# Patient Record
Sex: FEMALE | Race: WHITE | NOT HISPANIC OR LATINO | Employment: FULL TIME | URBAN - NONMETROPOLITAN AREA
[De-identification: names, ages, dates, MRNs, and addresses within clinical notes are randomized per-mention and may not be internally consistent; named-entity substitution may affect disease eponyms.]

---

## 2017-05-18 RX ORDER — FLUCONAZOLE 150 MG/1
150 TABLET ORAL ONCE
Qty: 2 TABLET | Refills: 0 | Status: SHIPPED | OUTPATIENT
Start: 2017-05-18 | End: 2017-05-18

## 2017-10-04 ENCOUNTER — TELEPHONE (OUTPATIENT)
Dept: OBSTETRICS AND GYNECOLOGY | Facility: CLINIC | Age: 47
End: 2017-10-04

## 2017-10-04 DIAGNOSIS — Z01.419 WELL WOMAN EXAM WITH ROUTINE GYNECOLOGICAL EXAM: Primary | ICD-10-CM

## 2017-10-04 NOTE — TELEPHONE ENCOUNTER
Pt has yearly appt on 10/12/17 but not until 2p. Wants yearly labs and knows she needs to be fasting. Wants to have labs ordered so she can come in the morning and then return for appt at 2.

## 2017-10-12 ENCOUNTER — OFFICE VISIT (OUTPATIENT)
Dept: OBSTETRICS AND GYNECOLOGY | Facility: CLINIC | Age: 47
End: 2017-10-12

## 2017-10-12 VITALS
SYSTOLIC BLOOD PRESSURE: 118 MMHG | DIASTOLIC BLOOD PRESSURE: 66 MMHG | HEIGHT: 66 IN | BODY MASS INDEX: 30.05 KG/M2 | WEIGHT: 187 LBS

## 2017-10-12 DIAGNOSIS — M25.50 ARTHRALGIA, UNSPECIFIED JOINT: ICD-10-CM

## 2017-10-12 DIAGNOSIS — N95.1 MENOPAUSAL SYMPTOMS: ICD-10-CM

## 2017-10-12 DIAGNOSIS — T75.3XXA SEA SICKNESS, INITIAL ENCOUNTER: ICD-10-CM

## 2017-10-12 DIAGNOSIS — E03.9 ACQUIRED HYPOTHYROIDISM: ICD-10-CM

## 2017-10-12 DIAGNOSIS — Z01.419 WELL WOMAN EXAM WITH ROUTINE GYNECOLOGICAL EXAM: Primary | ICD-10-CM

## 2017-10-12 DIAGNOSIS — F32.9 REACTIVE DEPRESSION: ICD-10-CM

## 2017-10-12 PROCEDURE — G0123 SCREEN CERV/VAG THIN LAYER: HCPCS | Performed by: NURSE PRACTITIONER

## 2017-10-12 PROCEDURE — 99213 OFFICE O/P EST LOW 20 MIN: CPT | Performed by: NURSE PRACTITIONER

## 2017-10-12 PROCEDURE — 99396 PREV VISIT EST AGE 40-64: CPT | Performed by: NURSE PRACTITIONER

## 2017-10-12 RX ORDER — SCOLOPAMINE TRANSDERMAL SYSTEM 1 MG/1
1 PATCH, EXTENDED RELEASE TRANSDERMAL
Qty: 4 PATCH | Refills: 1 | Status: SHIPPED | OUTPATIENT
Start: 2017-10-12 | End: 2018-10-18

## 2017-10-12 RX ORDER — BUPROPION HYDROCHLORIDE 150 MG/1
150 TABLET ORAL DAILY
Qty: 30 TABLET | Refills: 12 | Status: SHIPPED | OUTPATIENT
Start: 2017-10-12 | End: 2018-10-18 | Stop reason: SDUPTHER

## 2017-10-12 NOTE — PROGRESS NOTES
"Subjective   Ines Rowland is a 46 y.o. female     HPI Comments: Here for yearly check up. Has just seen endocrinology at [facility] for follow up of Thyroid Cancer.    Gynecologic Exam   The patient's pertinent negatives include no pelvic pain, vaginal bleeding or vaginal discharge. The patient is experiencing no pain. Pertinent negatives include no abdominal pain, anorexia, back pain, chills, constipation, diarrhea, discolored urine, dysuria, fever, flank pain, frequency, headaches, hematuria, joint pain, joint swelling, nausea, painful intercourse, rash, sore throat, urgency or vomiting. She is sexually active. She uses hysterectomy for contraception.             /66  Ht 66\" (167.6 cm)  Wt 187 lb (84.8 kg)  LMP 10/12/2005 (Approximate)  BMI 30.18 kg/m2      Outpatient Encounter Prescriptions as of 10/12/2017   Medication Sig Dispense Refill   • buPROPion XL (WELLBUTRIN XL) 150 MG 24 hr tablet Take 1 tablet by mouth Daily. 30 tablet 12   • calcitriol (ROCALTROL) 0.25 MCG capsule Take 0.25 mcg by mouth 2 (Two) Times a Day.     • calcium carbonate (OS-TIMOTEO) 600 MG tablet Take 600 mg by mouth 2 (Two) Times a Day.     • Hydrocod Polst-CPM Polst ER (TUSSIONEX PENNKINETIC ER) 10-8 MG/5ML ER suspension Take 5 mL by mouth Every 12 (Twelve) Hours As Needed for cough. 1 bottle 0   • levothyroxine (SYNTHROID, LEVOTHROID) 150 MCG tablet Take 150 mcg by mouth Daily.     • Scopolamine (TRANSDERM-SCOP, 1.5 MG,) 1.5 MG/3DAYS patch Place 1 patch on the skin Every 72 (Seventy-Two) Hours. 4 patch 1   • [DISCONTINUED] vilazodone (VIIBRYD) 20 MG tablet tablet Take 20 mg by mouth Daily.       No facility-administered encounter medications on file as of 10/12/2017.            Surgical History  Past Surgical History:   Procedure Laterality Date   • HYSTERECTOMY      without BSO   • KNEE ARTHROPLASTY     • TOTAL THYROIDECTOMY     • WISDOM TOOTH EXTRACTION           Family History  Family History   Problem Relation Age of Onset   • " Prostate cancer Father 72   • Arthritis Mother    • Arthritis Brother    • Colon cancer Paternal Uncle 50   • Thyroid cancer Other    • Breast cancer Neg Hx    • Ovarian cancer Neg Hx    • Uterine cancer Neg Hx    • Melanoma Neg Hx              The following portions of the patient's history were reviewed and updated as appropriate: allergies, current medications, past family history, past medical history, past social history, past surgical history and problem list.    Review of Systems   Constitutional: Negative for activity change, appetite change, chills, diaphoresis, fatigue, fever and unexpected weight change.   HENT: Negative for congestion, ear discharge, ear pain, facial swelling, hearing loss, mouth sores, nosebleeds, postnasal drip, rhinorrhea, sinus pressure, sneezing, sore throat, tinnitus, trouble swallowing and voice change.    Eyes: Negative for photophobia, pain, discharge, redness, itching and visual disturbance.   Respiratory: Negative for apnea, cough, choking, chest tightness and shortness of breath.    Cardiovascular: Negative for chest pain, palpitations and leg swelling.   Gastrointestinal: Negative for abdominal distention, abdominal pain, anal bleeding, anorexia, blood in stool, constipation, diarrhea, nausea, rectal pain and vomiting.   Endocrine: Negative for cold intolerance and heat intolerance.   Genitourinary: Negative for decreased urine volume, difficulty urinating, dyspareunia, dysuria, flank pain, frequency, genital sores, hematuria, menstrual problem, pelvic pain, urgency, vaginal bleeding, vaginal discharge and vaginal pain.   Musculoskeletal: Negative for arthralgias, back pain, joint pain, joint swelling and myalgias.   Skin: Negative for color change and rash.   Allergic/Immunologic: Negative for environmental allergies.   Neurological: Negative for dizziness, syncope, weakness, numbness and headaches.   Hematological: Negative for adenopathy.   Psychiatric/Behavioral:  Negative for agitation, confusion and sleep disturbance. The patient is not nervous/anxious.              Objective   Physical Exam   Constitutional: She is oriented to person, place, and time. She appears well-developed and well-nourished.   HENT:   Head: Normocephalic and atraumatic.   Right Ear: External ear normal.   Left Ear: External ear normal.   Eyes: Conjunctivae and EOM are normal. Right eye exhibits no discharge. Left eye exhibits no discharge. No scleral icterus.   Neck: Normal range of motion. Neck supple. Carotid bruit is not present. No thyromegaly present.   Cardiovascular: Regular rhythm and normal heart sounds.    No murmur heard.  Pulmonary/Chest: Effort normal and breath sounds normal. No respiratory distress. Right breast exhibits no inverted nipple, no mass, no nipple discharge, no skin change and no tenderness. Left breast exhibits no inverted nipple, no mass, no nipple discharge, no skin change and no tenderness. Breasts are symmetrical. There is no breast swelling.   Abdominal: Soft. Bowel sounds are normal. She exhibits no distension and no mass. There is no tenderness. There is no guarding. No hernia. Hernia confirmed negative in the right inguinal area and confirmed negative in the left inguinal area.   Genitourinary: Vagina normal. Rectal exam shows no mass. No breast tenderness, discharge or bleeding. There is no rash, tenderness, lesion or injury on the right labia. There is no rash, tenderness, lesion or injury on the left labia. Right adnexum displays no mass, no tenderness and no fullness. Left adnexum displays no mass, no tenderness and no fullness. No erythema or bleeding in the vagina. No signs of injury around the vagina. No vaginal discharge found.   Genitourinary Comments: Cervix, Uterus and Adnexa are surgically absent.  Urethra and urethral meatus normal  Bladder, normal no prolapse  Perineum and anus examined and without lesions   Musculoskeletal: Normal range of motion.  She exhibits no edema or tenderness.   Lymphadenopathy:        Head (right side): No submental, no submandibular, no tonsillar, no preauricular, no posterior auricular and no occipital adenopathy present.        Head (left side): No submental, no submandibular, no tonsillar, no preauricular, no posterior auricular and no occipital adenopathy present.     She has no cervical adenopathy.        Right cervical: No superficial cervical, no deep cervical and no posterior cervical adenopathy present.       Left cervical: No superficial cervical, no deep cervical and no posterior cervical adenopathy present.     She has no axillary adenopathy.        Right: No inguinal adenopathy present.        Left: No inguinal adenopathy present.   Neurological: She is alert and oriented to person, place, and time. She exhibits normal muscle tone. Coordination normal.   Skin: Skin is warm and dry. No bruising and no rash noted. No erythema.   Psychiatric: She has a normal mood and affect. Her behavior is normal. Judgment and thought content normal.   Nursing note and vitals reviewed.        Assessment/Plan   Ines was seen today for gynecologic exam.    Diagnoses and all orders for this visit:    Well woman exam with routine gynecological exam  Normal GYN exam. Will have lab work today. Encouraged SBE. Discussed weight management and importance of maintaining a healthy weight. Discussed Vitamin D intake and the importance of adequate vitamin D. Mammogram will be scheduled at [facility].  -     Liquid-based Pap Smear, Screening - ThinPrep Vial, Cervix  -     Mammo Screening Digital Tomosynthesis Bilateral With CAD; Future    Menopausal symptoms  Comments:  Having menopausal symptoms and wants to have lab work done today.  Orders:  -     Cortisol  -     DHEA-Sulfate  -     Estradiol  -     Follicle Stimulating Hormone  -     Luteinizing Hormone  -     Progesterone  -     Testosterone    Arthralgia, unspecified joint  Comments:  Pt. is having  joint pain. Wants to have an arthritis panel.  Orders:  -     Rheumatoid Arthritis Expanded Panel  -     C-reactive Protein  -     Sedimentation Rate    Acquired hypothyroidism  Comments:  Had thyroid cancer 2 years ago. Sees an endocrinologist at [facility] and wants labs done today.  Orders:  -     Iodine, Serum or Plasma  -     T3, Reverse  -     Thyroid Antibodies    Reactive depression  Comments:  Pt. having some depression and would like to try Wellbutrin.  Orders:  -     buPROPion XL (WELLBUTRIN XL) 150 MG 24 hr tablet; Take 1 tablet by mouth Daily.    Sea sickness, initial encounter  Comments:  Pt. is going on a cruise and requests Transderm scop.  Orders:  -     Scopolamine (TRANSDERM-SCOP, 1.5 MG,) 1.5 MG/3DAYS patch; Place 1 patch on the skin Every 72 (Seventy-Two) Hours.

## 2017-10-13 LAB
25(OH)D3+25(OH)D2 SERPL-MCNC: 28.4 NG/ML (ref 30–100)
ALBUMIN SERPL-MCNC: 4.2 G/DL (ref 3.5–5)
ALBUMIN/GLOB SERPL: 1.2 G/DL (ref 1.1–2.5)
ALP SERPL-CCNC: 103 U/L (ref 24–120)
ALT SERPL-CCNC: 40 U/L (ref 0–54)
APPEARANCE UR: CLEAR
AST SERPL-CCNC: 28 U/L (ref 7–45)
BACTERIA #/AREA URNS HPF: NORMAL /HPF
BASOPHILS # BLD AUTO: 0.02 10*3/MM3 (ref 0–0.2)
BASOPHILS NFR BLD AUTO: 0.2 % (ref 0–2)
BILIRUB SERPL-MCNC: 0.3 MG/DL (ref 0.1–1)
BILIRUB UR QL STRIP: NEGATIVE
BUN SERPL-MCNC: 20 MG/DL (ref 5–21)
BUN/CREAT SERPL: 23.8 (ref 7–25)
CALCIUM SERPL-MCNC: 9.1 MG/DL (ref 8.4–10.4)
CHLORIDE SERPL-SCNC: 105 MMOL/L (ref 98–110)
CHOLEST SERPL-MCNC: 190 MG/DL (ref 130–200)
CO2 SERPL-SCNC: 28 MMOL/L (ref 24–31)
COLOR UR: YELLOW
CREAT SERPL-MCNC: 0.84 MG/DL (ref 0.5–1.4)
EOSINOPHIL # BLD AUTO: 0.12 10*3/MM3 (ref 0–0.7)
EOSINOPHIL NFR BLD AUTO: 1.4 % (ref 0–4)
EPI CELLS #/AREA URNS HPF: NORMAL /HPF
ERYTHROCYTE [DISTWIDTH] IN BLOOD BY AUTOMATED COUNT: 13.4 % (ref 12–15)
GLOBULIN SER CALC-MCNC: 3.5 GM/DL
GLUCOSE SERPL-MCNC: 80 MG/DL (ref 70–100)
GLUCOSE UR QL: NEGATIVE
HBA1C MFR BLD: 5.1 %
HCT VFR BLD AUTO: 40.9 % (ref 37–47)
HDLC SERPL-MCNC: 69 MG/DL
HGB BLD-MCNC: 13.5 G/DL (ref 12–16)
HGB UR QL STRIP: NEGATIVE
IMM GRANULOCYTES # BLD: 0.02 10*3/MM3 (ref 0–0.03)
IMM GRANULOCYTES NFR BLD: 0.2 % (ref 0–5)
KETONES UR QL STRIP: NEGATIVE
LDLC SERPL CALC-MCNC: 107 MG/DL (ref 0–99)
LDLC/HDLC SERPL: 1.55 {RATIO}
LEUKOCYTE ESTERASE UR QL STRIP: NEGATIVE
LYMPHOCYTES # BLD AUTO: 1.82 10*3/MM3 (ref 0.72–4.86)
LYMPHOCYTES NFR BLD AUTO: 21.7 % (ref 15–45)
MCH RBC QN AUTO: 30.2 PG (ref 28–32)
MCHC RBC AUTO-ENTMCNC: 33 G/DL (ref 33–36)
MCV RBC AUTO: 91.5 FL (ref 82–98)
MICRO URNS: NORMAL
MICRO URNS: NORMAL
MONOCYTES # BLD AUTO: 0.95 10*3/MM3 (ref 0.19–1.3)
MONOCYTES NFR BLD AUTO: 11.3 % (ref 4–12)
MUCOUS THREADS URNS QL MICRO: PRESENT /HPF
NEUTROPHILS # BLD AUTO: 5.47 10*3/MM3 (ref 1.87–8.4)
NEUTROPHILS NFR BLD AUTO: 65.2 % (ref 39–78)
NITRITE UR QL STRIP: NEGATIVE
PH UR STRIP: 7 [PH] (ref 5–7.5)
PLATELET # BLD AUTO: 363 10*3/MM3 (ref 130–400)
POTASSIUM SERPL-SCNC: 4.6 MMOL/L (ref 3.5–5.3)
PROT SERPL-MCNC: 7.7 G/DL (ref 6.3–8.7)
PROT UR QL STRIP: NEGATIVE
RBC # BLD AUTO: 4.47 10*6/MM3 (ref 4.2–5.4)
RBC #/AREA URNS HPF: NORMAL /HPF
SODIUM SERPL-SCNC: 144 MMOL/L (ref 135–145)
SP GR UR: 1.02 (ref 1–1.03)
T3RU NFR SERPL: 32 % (ref 24–39)
T4 FREE SERPL-MCNC: 1.99 NG/DL (ref 0.78–2.19)
TRIGL SERPL-MCNC: 69 MG/DL (ref 0–149)
TSH SERPL DL<=0.005 MIU/L-ACNC: 0.21 MIU/ML (ref 0.47–4.68)
URINALYSIS REFLEX: NORMAL
UROBILINOGEN UR STRIP-MCNC: 0.2 MG/DL (ref 0.2–1)
VLDLC SERPL CALC-MCNC: 13.8 MG/DL
WBC # BLD AUTO: 8.4 10*3/MM3 (ref 4.8–10.8)
WBC #/AREA URNS HPF: NORMAL /HPF

## 2017-10-16 DIAGNOSIS — M05.79 RHEUMATOID ARTHRITIS INVOLVING MULTIPLE SITES WITH POSITIVE RHEUMATOID FACTOR (HCC): Primary | ICD-10-CM

## 2017-10-16 LAB
CCP IGA+IGG SERPL IA-ACNC: 16 UNITS (ref 0–19)
CORTIS SERPL-MCNC: 5.4 UG/DL
CRP SERPL-MCNC: 9.4 MG/L (ref 0–4.9)
DHEA-S SERPL-MCNC: 68.8 UG/DL (ref 41.2–243.7)
ERYTHROCYTE [SEDIMENTATION RATE] IN BLOOD BY WESTERGREN METHOD: 51 MM/HR (ref 0–32)
ESTRADIOL SERPL-MCNC: 7.8 PG/ML
FSH SERPL-ACNC: 16.8 MIU/ML
IODINE SERPL-MCNC: 38.9 UG/L (ref 40–92)
LH SERPL-ACNC: 8.7 MIU/ML
PROGEST SERPL-MCNC: <0.1 NG/ML
RHEUMATOID FACT SERPL-ACNC: 32.7 IU/ML (ref 0–13.9)
T3REVERSE SERPL-MCNC: 21.2 NG/DL (ref 9.2–24.1)
TESTOST SERPL-MCNC: 6 NG/DL (ref 8–48)
THYROGLOB AB SERPL-ACNC: <1 IU/ML (ref 0–0.9)
THYROPEROXIDASE AB SERPL-ACNC: 12 IU/ML (ref 0–34)

## 2017-10-17 LAB
GEN CATEG CVX/VAG CYTO-IMP: NORMAL
LAB AP CASE REPORT: NORMAL
LAB AP GYN ADDITIONAL INFORMATION: NORMAL
Lab: NORMAL
PATH INTERP SPEC-IMP: NORMAL
STAT OF ADQ CVX/VAG CYTO-IMP: NORMAL

## 2017-10-19 ENCOUNTER — DOCUMENTATION (OUTPATIENT)
Dept: OBSTETRICS AND GYNECOLOGY | Facility: CLINIC | Age: 47
End: 2017-10-19

## 2017-11-03 ENCOUNTER — HOSPITAL ENCOUNTER (OUTPATIENT)
Dept: MAMMOGRAPHY | Facility: HOSPITAL | Age: 47
Discharge: HOME | End: 2017-11-03
Admitting: NURSE PRACTITIONER

## 2017-11-03 DIAGNOSIS — Z01.419 WELL WOMAN EXAM WITH ROUTINE GYNECOLOGICAL EXAM: ICD-10-CM

## 2017-11-03 DIAGNOSIS — R92.8 ABNORMAL MAMMOGRAM: Primary | ICD-10-CM

## 2017-11-03 PROCEDURE — 77063 BREAST TOMOSYNTHESIS BI: CPT

## 2017-11-03 PROCEDURE — G0202 SCR MAMMO BI INCL CAD: HCPCS

## 2017-11-03 NOTE — PROGRESS NOTES
Pt informed she needs to have mammogram et u/s of left breast. TRC states it does not look suspicious, but needs to be done. They will call pt with apt.

## 2017-11-07 ENCOUNTER — HOSPITAL ENCOUNTER (OUTPATIENT)
Dept: ULTRASOUND IMAGING | Facility: HOSPITAL | Age: 47
Discharge: HOME | End: 2017-11-07

## 2017-11-07 ENCOUNTER — HOSPITAL ENCOUNTER (OUTPATIENT)
Dept: MAMMOGRAPHY | Facility: HOSPITAL | Age: 47
Discharge: HOME | End: 2017-11-07
Admitting: NURSE PRACTITIONER

## 2017-11-07 DIAGNOSIS — R92.8 ABNORMAL MAMMOGRAM: ICD-10-CM

## 2017-11-07 PROCEDURE — 76642 ULTRASOUND BREAST LIMITED: CPT

## 2017-11-07 PROCEDURE — G0206 DX MAMMO INCL CAD UNI: HCPCS

## 2017-11-07 PROCEDURE — G0279 TOMOSYNTHESIS, MAMMO: HCPCS

## 2018-04-09 ENCOUNTER — TRANSCRIBE ORDERS (OUTPATIENT)
Dept: ADMINISTRATIVE | Facility: HOSPITAL | Age: 48
End: 2018-04-09

## 2018-04-09 DIAGNOSIS — R92.8 ABNORMAL MAMMOGRAM: Primary | ICD-10-CM

## 2018-04-12 ENCOUNTER — HOSPITAL ENCOUNTER (OUTPATIENT)
Dept: ULTRASOUND IMAGING | Facility: HOSPITAL | Age: 48
Discharge: HOME | End: 2018-04-12
Attending: OBSTETRICS & GYNECOLOGY

## 2018-04-12 DIAGNOSIS — R92.8 ABNORMAL MAMMOGRAM: ICD-10-CM

## 2018-04-12 PROCEDURE — 76642 ULTRASOUND BREAST LIMITED: CPT

## 2018-04-13 ENCOUNTER — TELEPHONE (OUTPATIENT)
Dept: OBSTETRICS AND GYNECOLOGY | Facility: CLINIC | Age: 48
End: 2018-04-13

## 2018-04-13 NOTE — TELEPHONE ENCOUNTER
----- Message from ORACIO Abernathy sent at 4/12/2018  9:20 PM CDT -----  US final assessment indicates benign simple cyst, left breast.   Pt may resume annual screening mammograms in 6 months.     Please continue annual physical exams.

## 2018-10-18 ENCOUNTER — OFFICE VISIT (OUTPATIENT)
Dept: OBSTETRICS AND GYNECOLOGY | Facility: CLINIC | Age: 48
End: 2018-10-18

## 2018-10-18 VITALS
BODY MASS INDEX: 29.41 KG/M2 | HEIGHT: 66 IN | SYSTOLIC BLOOD PRESSURE: 116 MMHG | WEIGHT: 183 LBS | DIASTOLIC BLOOD PRESSURE: 64 MMHG

## 2018-10-18 DIAGNOSIS — Z01.419 WELL WOMAN EXAM WITH ROUTINE GYNECOLOGICAL EXAM: Primary | ICD-10-CM

## 2018-10-18 DIAGNOSIS — Z80.0 FAMILY HISTORY OF COLON CANCER: ICD-10-CM

## 2018-10-18 DIAGNOSIS — R53.83 FATIGUE, UNSPECIFIED TYPE: ICD-10-CM

## 2018-10-18 DIAGNOSIS — F32.9 REACTIVE DEPRESSION: ICD-10-CM

## 2018-10-18 DIAGNOSIS — C73 THYROID CANCER (HCC): ICD-10-CM

## 2018-10-18 PROCEDURE — 99213 OFFICE O/P EST LOW 20 MIN: CPT | Performed by: NURSE PRACTITIONER

## 2018-10-18 PROCEDURE — 99396 PREV VISIT EST AGE 40-64: CPT | Performed by: NURSE PRACTITIONER

## 2018-10-18 RX ORDER — BUPROPION HYDROCHLORIDE 150 MG/1
150 TABLET ORAL DAILY
Qty: 30 TABLET | Refills: 12 | Status: SHIPPED | OUTPATIENT
Start: 2018-10-18 | End: 2019-10-25 | Stop reason: SDUPTHER

## 2018-10-18 NOTE — PROGRESS NOTES
"Subjective   Ines Rowland is a 47 y.o. female     Here for yearly check up. Has just seen endocrinology at [facility] for follow up of Thyroid Cancer.      Gynecologic Exam   The patient's pertinent negatives include no pelvic pain, vaginal bleeding or vaginal discharge. The patient is experiencing no pain. Pertinent negatives include no abdominal pain, anorexia, back pain, chills, constipation, diarrhea, discolored urine, dysuria, fever, flank pain, frequency, headaches, hematuria, joint pain, joint swelling, nausea, painful intercourse, rash, sore throat, urgency or vomiting. She is sexually active. She uses hysterectomy for contraception.             /64   Ht 167.6 cm (66\")   Wt 83 kg (183 lb)   LMP 10/12/2005 (Approximate) Comment: Pelvic pain  BMI 29.54 kg/m²       Outpatient Encounter Prescriptions as of 10/18/2018   Medication Sig Dispense Refill   • buPROPion XL (WELLBUTRIN XL) 150 MG 24 hr tablet Take 1 tablet by mouth Daily. 30 tablet 12   • calcitriol (ROCALTROL) 0.25 MCG capsule Take 0.25 mcg by mouth 2 (Two) Times a Day.     • calcium carbonate (OS-TIMOTEO) 600 MG tablet Take 600 mg by mouth 2 (Two) Times a Day.     • levothyroxine (SYNTHROID, LEVOTHROID) 150 MCG tablet Take 150 mcg by mouth Daily.     • [DISCONTINUED] Hydrocod Polst-CPM Polst ER (TUSSIONEX PENNKINETIC ER) 10-8 MG/5ML ER suspension Take 5 mL by mouth Every 12 (Twelve) Hours As Needed for cough. 1 bottle 0   • [DISCONTINUED] Scopolamine (TRANSDERM-SCOP, 1.5 MG,) 1.5 MG/3DAYS patch Place 1 patch on the skin Every 72 (Seventy-Two) Hours. 4 patch 1     No facility-administered encounter medications on file as of 10/18/2018.            Surgical History  Past Surgical History:   Procedure Laterality Date   • HYSTERECTOMY      without BSO   • KNEE ARTHROPLASTY     • TOTAL THYROIDECTOMY     • WISDOM TOOTH EXTRACTION           Family History  Family History   Problem Relation Age of Onset   • Prostate cancer Father 72   • Arthritis Mother  "   • Arthritis Brother    • Colon cancer Paternal Uncle 50   • Thyroid cancer Other    • Breast cancer Neg Hx    • Ovarian cancer Neg Hx    • Uterine cancer Neg Hx    • Melanoma Neg Hx              The following portions of the patient's history were reviewed and updated as appropriate: allergies, current medications, past family history, past medical history, past social history, past surgical history and problem list.    Review of Systems   Constitutional: Negative for activity change, appetite change, chills, diaphoresis, fatigue, fever and unexpected weight change.   HENT: Negative for congestion, dental problem, drooling, ear discharge, ear pain, facial swelling, hearing loss, mouth sores, nosebleeds, postnasal drip, rhinorrhea, sinus pain, sinus pressure, sneezing, sore throat, tinnitus, trouble swallowing and voice change.    Eyes: Negative for photophobia, pain, discharge, redness, itching and visual disturbance.   Respiratory: Negative for apnea, cough, choking, chest tightness, shortness of breath, wheezing and stridor.    Cardiovascular: Negative for chest pain, palpitations and leg swelling.   Gastrointestinal: Negative for abdominal distention, abdominal pain, anal bleeding, anorexia, blood in stool, constipation, diarrhea, nausea, rectal pain and vomiting.   Endocrine: Negative for cold intolerance, heat intolerance, polydipsia, polyphagia and polyuria.   Genitourinary: Negative for decreased urine volume, difficulty urinating, dyspareunia, dysuria, enuresis, flank pain, frequency, genital sores, hematuria, menstrual problem, pelvic pain, urgency, vaginal bleeding, vaginal discharge and vaginal pain.   Musculoskeletal: Negative for arthralgias, back pain, gait problem, joint pain, joint swelling, myalgias, neck pain and neck stiffness.   Skin: Negative for color change, pallor, rash and wound.   Allergic/Immunologic: Negative for environmental allergies, food allergies and immunocompromised state.    Neurological: Negative for dizziness, tremors, seizures, syncope, facial asymmetry, speech difficulty, weakness, light-headedness, numbness and headaches.   Hematological: Negative for adenopathy. Does not bruise/bleed easily.   Psychiatric/Behavioral: Negative for agitation, behavioral problems, confusion, decreased concentration, dysphoric mood, hallucinations, self-injury, sleep disturbance and suicidal ideas. The patient is not nervous/anxious and is not hyperactive.              Objective   Physical Exam   Constitutional: She is oriented to person, place, and time. She appears well-developed and well-nourished.   HENT:   Head: Normocephalic and atraumatic.   Right Ear: External ear normal.   Left Ear: External ear normal.   Eyes: Conjunctivae and EOM are normal. Right eye exhibits no discharge. Left eye exhibits no discharge. No scleral icterus.   Neck: Normal range of motion. Neck supple. Carotid bruit is not present. No thyromegaly present.   Cardiovascular: Regular rhythm and normal heart sounds.    No murmur heard.  Pulmonary/Chest: Effort normal and breath sounds normal. No respiratory distress. Right breast exhibits no inverted nipple, no mass, no nipple discharge, no skin change and no tenderness. Left breast exhibits no inverted nipple, no mass, no nipple discharge, no skin change and no tenderness. Breasts are symmetrical. There is no breast swelling.   Abdominal: Soft. Bowel sounds are normal. She exhibits no distension and no mass. There is no tenderness. There is no guarding. No hernia. Hernia confirmed negative in the right inguinal area and confirmed negative in the left inguinal area.   Genitourinary: Vagina normal. Rectal exam shows no mass. No breast tenderness, discharge or bleeding. There is no rash, tenderness, lesion or injury on the right labia. There is no rash, tenderness, lesion or injury on the left labia. Right adnexum displays no mass, no tenderness and no fullness. Left adnexum  displays no mass, no tenderness and no fullness. No erythema or bleeding in the vagina. No signs of injury around the vagina. No vaginal discharge found.   Genitourinary Comments: Cervix, Uterus and Adnexa are surgically absent.  Urethra and urethral meatus normal  Bladder, normal no prolapse  Perineum and anus examined and without lesions   Musculoskeletal: Normal range of motion. She exhibits no edema or tenderness.   Lymphadenopathy:        Head (right side): No submental, no submandibular, no tonsillar, no preauricular, no posterior auricular and no occipital adenopathy present.        Head (left side): No submental, no submandibular, no tonsillar, no preauricular, no posterior auricular and no occipital adenopathy present.     She has no cervical adenopathy.        Right cervical: No superficial cervical, no deep cervical and no posterior cervical adenopathy present.       Left cervical: No superficial cervical, no deep cervical and no posterior cervical adenopathy present.     She has no axillary adenopathy.        Right: No inguinal adenopathy present.        Left: No inguinal adenopathy present.   Neurological: She is alert and oriented to person, place, and time. She exhibits normal muscle tone. Coordination normal.   Skin: Skin is warm and dry. No bruising and no rash noted. No erythema.   Psychiatric: She has a normal mood and affect. Her behavior is normal. Judgment and thought content normal.   Nursing note and vitals reviewed.        Assessment/Plan   Ines was seen today for gynecologic exam.    Diagnoses and all orders for this visit:    Well woman exam with routine gynecological exam  Normal GYN exam. Will have lab work today. Encouraged SBE. Discussed weight management and importance of maintaining a healthy weight. Discussed Vitamin D intake and the importance of adequate vitamin D. Mammogram will be scheduled at [facility].  -     Liquid-based Pap Smear, Screening - ThinPrep Vial, Cervix  -     Mammo  Screening Digital Tomosynthesis Bilateral With CAD; Future    Menopausal symptoms  Comments:  Having menopausal symptoms and wants to have lab work done today.  Orders:  -     Cortisol  -     DHEA-Sulfate  -     Estradiol  -     Follicle Stimulating Hormone  -     Luteinizing Hormone  -     Progesterone  -     Testosterone    Arthralgia, unspecified joint  Comments:  Pt. is having joint pain. Wants to have an arthritis panel.  Orders:  -     Rheumatoid Arthritis Expanded Panel  -     C-reactive Protein  -     Sedimentation Rate    Acquired hypothyroidism  Comments:  Had thyroid cancer 2 years ago. Sees an endocrinologist at [facility] and wants labs done today.  Orders:  -     Iodine, Serum or Plasma  -     T3, Reverse  -     Thyroid Antibodies    Reactive depression  Comments:  Pt. having some depression and would like to try Wellbutrin.  Orders:  -     buPROPion XL (WELLBUTRIN XL) 150 MG 24 hr tablet; Take 1 tablet by mouth Daily.    Sea sickness, initial encounter  Comments:  Pt. is going on a cruise and requests Transderm scop.  Orders:  -     Scopolamine (TRANSDERM-SCOP, 1.5 MG,) 1.5 MG/3DAYS patch; Place 1 patch on the skin Every 72 (Seventy-Two) Hours.

## 2018-10-18 NOTE — PATIENT INSTRUCTIONS

## 2018-10-18 NOTE — PROGRESS NOTES
Attempted to obtain health maintenance information, patient unable to provide answers for the following items Tdap, Colonoscopy, Pneumococcal Vaccine, Medicare Annual Wellness Exam, Lipid Panel, Diabetic Eye Exam, Diabetic Foot Exam, HPV Vaccine, Chlamydia Screening , HgbA1c and Zoster Vaccine.

## 2018-10-18 NOTE — PROGRESS NOTES
"Subjective     Ines Rowland is a 47 y.o. female    Here for yearly check up. Has C/O fatigue. Has just had her thyroid checked with Dr. Panda and it was where he wants her levels to be. She has a HX of thyroid cancer.        Gynecologic Exam   The patient's pertinent negatives include no pelvic pain, vaginal bleeding or vaginal discharge. The patient is experiencing no pain. Associated symptoms include constipation. Pertinent negatives include no abdominal pain, anorexia, back pain, chills, diarrhea, discolored urine, dysuria, fever, flank pain, frequency, headaches, hematuria, joint pain, joint swelling, nausea, painful intercourse, rash, sore throat, urgency or vomiting. The symptoms are aggravated by bowel movements. Treatments tried: miralax. She is sexually active. She uses hysterectomy for contraception. She is postmenopausal.   Fatigue   This is a recurrent problem. The current episode started more than 1 month ago. The problem occurs intermittently. The problem has been waxing and waning. Associated symptoms include fatigue. Pertinent negatives include no abdominal pain, anorexia, arthralgias, change in bowel habit, chest pain, chills, congestion, coughing, diaphoresis, fever, headaches, joint swelling, myalgias, nausea, neck pain, numbness, rash, sore throat, swollen glands, urinary symptoms, vertigo, visual change, vomiting or weakness. Nothing aggravates the symptoms. She has tried nothing for the symptoms.         /64   Ht 167.6 cm (66\")   Wt 83 kg (183 lb)   LMP 10/12/2005 (Approximate) Comment: Pelvic pain  BMI 29.54 kg/m²     Outpatient Encounter Prescriptions as of 10/18/2018   Medication Sig Dispense Refill   • buPROPion XL (WELLBUTRIN XL) 150 MG 24 hr tablet Take 1 tablet by mouth Daily. 30 tablet 12   • calcitriol (ROCALTROL) 0.25 MCG capsule Take 0.25 mcg by mouth 2 (Two) Times a Day.     • calcium carbonate (OS-TIMOTEO) 600 MG tablet Take 600 mg by mouth 2 (Two) Times a Day.     • " levothyroxine (SYNTHROID, LEVOTHROID) 150 MCG tablet Take 150 mcg by mouth Daily.     • [DISCONTINUED] buPROPion XL (WELLBUTRIN XL) 150 MG 24 hr tablet Take 1 tablet by mouth Daily. 30 tablet 12   • [DISCONTINUED] Hydrocod Polst-CPM Polst ER (TUSSIONEX PENNKINETIC ER) 10-8 MG/5ML ER suspension Take 5 mL by mouth Every 12 (Twelve) Hours As Needed for cough. 1 bottle 0   • [DISCONTINUED] Scopolamine (TRANSDERM-SCOP, 1.5 MG,) 1.5 MG/3DAYS patch Place 1 patch on the skin Every 72 (Seventy-Two) Hours. 4 patch 1     No facility-administered encounter medications on file as of 10/18/2018.        Surgical History  Past Surgical History:   Procedure Laterality Date   • HYSTERECTOMY      without BSO   • KNEE ARTHROPLASTY     • TOTAL THYROIDECTOMY     • WISDOM TOOTH EXTRACTION         Family History  Family History   Problem Relation Age of Onset   • Prostate cancer Father 72   • Arthritis Mother    • Arthritis Brother    • Colon cancer Paternal Uncle 50   • Thyroid cancer Other    • Breast cancer Neg Hx    • Ovarian cancer Neg Hx    • Uterine cancer Neg Hx    • Melanoma Neg Hx        The following portions of the patient's history were reviewed and updated as appropriate: allergies, current medications, past family history, past medical history, past social history, past surgical history and problem list.    Review of Systems   Constitutional: Positive for fatigue. Negative for activity change, appetite change, chills, diaphoresis, fever, unexpected weight gain and unexpected weight loss.   HENT: Negative for congestion, dental problem, drooling, ear discharge, ear pain, facial swelling, hearing loss, mouth sores, nosebleeds, postnasal drip, rhinorrhea, sinus pressure, sneezing, sore throat, tinnitus, trouble swallowing and voice change.    Eyes: Negative for blurred vision, double vision, photophobia, pain, discharge, redness, itching and visual disturbance.   Respiratory: Negative for apnea, cough, choking, chest tightness,  shortness of breath, wheezing and stridor.    Cardiovascular: Negative for chest pain, palpitations and leg swelling.   Gastrointestinal: Positive for constipation. Negative for abdominal distention, abdominal pain, anal bleeding, anorexia, blood in stool, change in bowel habit, diarrhea, nausea, rectal pain, vomiting, GERD and indigestion.   Endocrine: Negative for cold intolerance, heat intolerance, polydipsia, polyphagia and polyuria.   Genitourinary: Negative for breast discharge, urinary incontinence, decreased libido, decreased urine volume, difficulty urinating, dyspareunia, dysuria, flank pain, frequency, genital sores, hematuria, menstrual problem, pelvic pain, urgency, vaginal bleeding, vaginal discharge, vaginal pain, breast lump and breast pain.   Musculoskeletal: Negative for arthralgias, back pain, gait problem, joint pain, joint swelling, myalgias, neck pain, neck stiffness and bursitis.   Skin: Negative for color change, dry skin and rash.   Allergic/Immunologic: Negative for environmental allergies, food allergies and immunocompromised state.   Neurological: Negative for dizziness, vertigo, tremors, seizures, syncope, facial asymmetry, speech difficulty, weakness, light-headedness, numbness, headache, memory problem and confusion.   Hematological: Negative for adenopathy. Does not bruise/bleed easily.   Psychiatric/Behavioral: Negative for agitation, behavioral problems, decreased concentration, dysphoric mood, hallucinations, self-injury, sleep disturbance, suicidal ideas, negative for hyperactivity, depressed mood and stress. The patient is not nervous/anxious.        Objective   Physical Exam   Constitutional: She is oriented to person, place, and time. She appears well-developed and well-nourished.   HENT:   Head: Normocephalic and atraumatic.   Right Ear: External ear normal.   Left Ear: External ear normal.   Eyes: Conjunctivae and EOM are normal. Right eye exhibits no discharge. Left eye  exhibits no discharge. No scleral icterus.   Neck: Normal range of motion. Neck supple. Carotid bruit is not present. No thyromegaly present.   Cardiovascular: Regular rhythm and normal heart sounds.    No murmur heard.  Pulmonary/Chest: Effort normal and breath sounds normal. No respiratory distress. Right breast exhibits no inverted nipple, no mass, no nipple discharge, no skin change and no tenderness. Left breast exhibits no inverted nipple, no mass, no nipple discharge, no skin change and no tenderness. Breasts are symmetrical. There is no breast swelling.   Abdominal: Soft. Bowel sounds are normal. She exhibits no distension and no mass. There is no tenderness. There is no guarding. No hernia. Hernia confirmed negative in the right inguinal area and confirmed negative in the left inguinal area.   Genitourinary: Vagina normal. Rectal exam shows no mass. No breast tenderness, discharge or bleeding. There is no rash, tenderness, lesion or injury on the right labia. There is no rash, tenderness, lesion or injury on the left labia. No erythema or bleeding in the vagina. No signs of injury around the vagina. No vaginal discharge found.   Genitourinary Comments: Cervix, Uterus and Adnexa are surgically absent.  Urethra and urethral meatus normal  Bladder, normal no prolapse  Perineum and anus examined and without lesions   Musculoskeletal: Normal range of motion. She exhibits no edema or tenderness.   Lymphadenopathy:        Head (right side): No submental, no submandibular, no tonsillar, no preauricular, no posterior auricular and no occipital adenopathy present.        Head (left side): No submental, no submandibular, no tonsillar, no preauricular, no posterior auricular and no occipital adenopathy present.     She has no cervical adenopathy.        Right cervical: No superficial cervical, no deep cervical and no posterior cervical adenopathy present.       Left cervical: No superficial cervical, no deep cervical  and no posterior cervical adenopathy present.     She has no axillary adenopathy.        Right: No inguinal adenopathy present.        Left: No inguinal adenopathy present.   Neurological: She is alert and oriented to person, place, and time. She exhibits normal muscle tone. Coordination normal.   Skin: Skin is warm and dry. No bruising and no rash noted. No erythema.   Psychiatric: She has a normal mood and affect. Her behavior is normal. Judgment and thought content normal.   Nursing note and vitals reviewed.      Assessment/Plan   Ines was seen today for gynecologic exam.    Diagnoses and all orders for this visit:    Well woman exam with routine gynecological exam  Normal GYN exam. Will have lab work at PCP. Encouraged SBE, pt is aware how to do self breast exam and the importance of same. Discussed weight management and importance of maintaining a healthy weight. Discussed Vitamin D intake and the importance of adequate vitamin D for both Bone Health and a healthy immune system.  Discussed Daily exercise and the importance of same, in regards to a healthy heart as well as helping to maintain her weight and improving her mental health.  BMI  29.6. Mammogram and Bone density [facility] at [facility].  Pap smear is not done per ASCCP guidelines.  -     UA / M With / Rflx Culture([facility] ONLY) - Urine, Clean Catch  -     Mammo Screening Digital Tomosynthesis Bilateral With CAD; Future  -     DEXA Bone Density Axial; Future  -     Occult Blood, Fecal By Immunoassay - Stool, Per Rectum    Thyroid cancer (CMS/HCC)  Comments:  Had surgery in 2016. Was followed by Endocrinology at [facility], but now is followed by Dr. Adamson. No evidence of mass today.     Reactive depression  Comments:  Pt. is doing well on Wellbutrin. Rf given.  Orders:  -     buPROPion XL (WELLBUTRIN XL) 150 MG 24 hr tablet; Take 1 tablet by mouth Daily.    Fatigue, unspecified type  Comments:  Pt is having a lot of fatigue. Will have lab work  done today.  Orders:  -     Hemoglobin A1c  -     Vitamin D 25 Hydroxy  -     Vitamin B12    Family history of colon cancer  Pt has a family history of Colon  Cancer. We discussed [employer] Genetic screening that can be done to see if she carries the Gene for Breast, Ovarian, Colon, Melanoma, Uterine and Pancreatic Cancers. We discussed if positive she will have free Genetic counseling through [facility]. We also discussed if she does have the positive gene she can have more testing yearly and even surgery if desired.         Patient's Body mass index is 29.54 kg/m². BMI is above normal parameters. Recommendations include: educational material, exercise counseling and nutrition counseling.      Lynette Gallegos, APRN  10/18/2018

## 2018-10-19 LAB
25(OH)D3+25(OH)D2 SERPL-MCNC: 37.8 NG/ML (ref 30–100)
APPEARANCE UR: ABNORMAL
BACTERIA #/AREA URNS HPF: ABNORMAL /HPF
BILIRUB UR QL STRIP: NEGATIVE
COLOR UR: YELLOW
CRYSTALS URNS MICRO: ABNORMAL
EPI CELLS #/AREA URNS HPF: ABNORMAL /HPF
GLUCOSE UR QL: NEGATIVE
HBA1C MFR BLD: 5.2 %
HEMOCCULT STL QL IA: NEGATIVE
HGB UR QL STRIP: NEGATIVE
KETONES UR QL STRIP: NEGATIVE
LEUKOCYTE ESTERASE UR QL STRIP: NEGATIVE
MICRO URNS: ABNORMAL
MICRO URNS: ABNORMAL
MUCOUS THREADS URNS QL MICRO: PRESENT /HPF
NITRITE UR QL STRIP: NEGATIVE
PH UR STRIP: 6.5 [PH] (ref 5–7.5)
PROT UR QL STRIP: NEGATIVE
RBC #/AREA URNS HPF: ABNORMAL /HPF
SP GR UR: 1.02 (ref 1–1.03)
UNIDENT CRYS URNS QL MICRO: PRESENT /LPF
URINALYSIS REFLEX: ABNORMAL
UROBILINOGEN UR STRIP-MCNC: 0.2 MG/DL (ref 0.2–1)
VIT B12 SERPL-MCNC: 539 PG/ML (ref 239–931)
WBC #/AREA URNS HPF: ABNORMAL /HPF

## 2018-11-12 ENCOUNTER — APPOINTMENT (OUTPATIENT)
Dept: BONE DENSITY | Facility: HOSPITAL | Age: 48
End: 2018-11-12

## 2018-11-12 ENCOUNTER — HOSPITAL ENCOUNTER (OUTPATIENT)
Dept: MAMMOGRAPHY | Facility: HOSPITAL | Age: 48
Discharge: HOME | End: 2018-11-12
Admitting: NURSE PRACTITIONER

## 2018-11-12 DIAGNOSIS — Z01.419 WELL WOMAN EXAM WITH ROUTINE GYNECOLOGICAL EXAM: ICD-10-CM

## 2018-11-12 PROCEDURE — 77063 BREAST TOMOSYNTHESIS BI: CPT

## 2018-11-12 PROCEDURE — 77067 SCR MAMMO BI INCL CAD: CPT

## 2019-09-26 ENCOUNTER — TRANSCRIBE ORDERS (OUTPATIENT)
Dept: ADMINISTRATIVE | Facility: HOSPITAL | Age: 49
End: 2019-09-26

## 2019-09-26 DIAGNOSIS — R06.02 SHORTNESS OF BREATH: Primary | ICD-10-CM

## 2019-10-01 ENCOUNTER — HOSPITAL ENCOUNTER (OUTPATIENT)
Dept: PULMONOLOGY | Facility: HOSPITAL | Age: 49
Discharge: HOME | End: 2019-10-01

## 2019-10-01 ENCOUNTER — HOSPITAL ENCOUNTER (OUTPATIENT)
Dept: CARDIOLOGY | Facility: HOSPITAL | Age: 49
Discharge: HOME | End: 2019-10-01
Admitting: INTERNAL MEDICINE

## 2019-10-01 VITALS
HEIGHT: 66 IN | WEIGHT: 183 LBS | BODY MASS INDEX: 29.41 KG/M2 | SYSTOLIC BLOOD PRESSURE: 120 MMHG | DIASTOLIC BLOOD PRESSURE: 80 MMHG

## 2019-10-01 DIAGNOSIS — R06.02 SHORTNESS OF BREATH: ICD-10-CM

## 2019-10-01 LAB
BH CV ECHO MEAS - AO MAX PG (FULL): 1.7 MMHG
BH CV ECHO MEAS - AO MAX PG: 5.4 MMHG
BH CV ECHO MEAS - AO MEAN PG (FULL): 1 MMHG
BH CV ECHO MEAS - AO MEAN PG: 3 MMHG
BH CV ECHO MEAS - AO ROOT AREA (BSA CORRECTED): 1.6
BH CV ECHO MEAS - AO ROOT AREA: 7.1 CM^2
BH CV ECHO MEAS - AO ROOT DIAM: 3 CM
BH CV ECHO MEAS - AO V2 MAX: 116 CM/SEC
BH CV ECHO MEAS - AO V2 MEAN: 69.5 CM/SEC
BH CV ECHO MEAS - AO V2 VTI: 25.8 CM
BH CV ECHO MEAS - AVA(I,A): 4.7 CM^2
BH CV ECHO MEAS - AVA(I,D): 4.7 CM^2
BH CV ECHO MEAS - AVA(V,A): 4.1 CM^2
BH CV ECHO MEAS - AVA(V,D): 4.1 CM^2
BH CV ECHO MEAS - BSA(HAYCOCK): 2 M^2
BH CV ECHO MEAS - BSA: 1.9 M^2
BH CV ECHO MEAS - BZI_BMI: 29.5 KILOGRAMS/M^2
BH CV ECHO MEAS - BZI_METRIC_HEIGHT: 167.6 CM
BH CV ECHO MEAS - BZI_METRIC_WEIGHT: 83 KG
BH CV ECHO MEAS - EDV(CUBED): 130.3 ML
BH CV ECHO MEAS - EDV(MOD-SP4): 74.7 ML
BH CV ECHO MEAS - EDV(TEICH): 122.1 ML
BH CV ECHO MEAS - EF(CUBED): 78.4 %
BH CV ECHO MEAS - EF(MOD-SP4): 65.3 %
BH CV ECHO MEAS - EF(TEICH): 70.4 %
BH CV ECHO MEAS - ESV(CUBED): 28.1 ML
BH CV ECHO MEAS - ESV(MOD-SP4): 25.9 ML
BH CV ECHO MEAS - ESV(TEICH): 36.2 ML
BH CV ECHO MEAS - FS: 40 %
BH CV ECHO MEAS - IVS/LVPW: 1
BH CV ECHO MEAS - IVSD: 0.97 CM
BH CV ECHO MEAS - LA DIMENSION: 2.8 CM
BH CV ECHO MEAS - LA/AO: 0.93
BH CV ECHO MEAS - LAT PEAK E' VEL: 5.9 CM/SEC
BH CV ECHO MEAS - LV DIASTOLIC VOL/BSA (35-75): 38.8 ML/M^2
BH CV ECHO MEAS - LV MASS(C)D: 178.3 GRAMS
BH CV ECHO MEAS - LV MASS(C)DI: 92.6 GRAMS/M^2
BH CV ECHO MEAS - LV MAX PG: 3.7 MMHG
BH CV ECHO MEAS - LV MEAN PG: 2 MMHG
BH CV ECHO MEAS - LV SYSTOLIC VOL/BSA (12-30): 13.4 ML/M^2
BH CV ECHO MEAS - LV V1 MAX: 96.1 CM/SEC
BH CV ECHO MEAS - LV V1 MEAN: 58.6 CM/SEC
BH CV ECHO MEAS - LV V1 VTI: 24.8 CM
BH CV ECHO MEAS - LVIDD: 5.1 CM
BH CV ECHO MEAS - LVIDS: 3 CM
BH CV ECHO MEAS - LVLD AP4: 7.7 CM
BH CV ECHO MEAS - LVLS AP4: 6.2 CM
BH CV ECHO MEAS - LVOT AREA (M): 4.9 CM^2
BH CV ECHO MEAS - LVOT AREA: 4.9 CM^2
BH CV ECHO MEAS - LVOT DIAM: 2.5 CM
BH CV ECHO MEAS - LVPWD: 0.97 CM
BH CV ECHO MEAS - MED PEAK E' VEL: 9.4 CM/SEC
BH CV ECHO MEAS - MV A MAX VEL: 72.8 CM/SEC
BH CV ECHO MEAS - MV DEC TIME: 0.23 SEC
BH CV ECHO MEAS - MV E MAX VEL: 93.6 CM/SEC
BH CV ECHO MEAS - MV E/A: 1.3
BH CV ECHO MEAS - RAP SYSTOLE: 5 MMHG
BH CV ECHO MEAS - RVDD: 4.1 CM
BH CV ECHO MEAS - RVSP: 28.6 MMHG
BH CV ECHO MEAS - SI(AO): 94.7 ML/M^2
BH CV ECHO MEAS - SI(CUBED): 53.1 ML/M^2
BH CV ECHO MEAS - SI(LVOT): 63.2 ML/M^2
BH CV ECHO MEAS - SI(MOD-SP4): 25.3 ML/M^2
BH CV ECHO MEAS - SI(TEICH): 44.6 ML/M^2
BH CV ECHO MEAS - SV(AO): 182.4 ML
BH CV ECHO MEAS - SV(CUBED): 102.2 ML
BH CV ECHO MEAS - SV(LVOT): 121.7 ML
BH CV ECHO MEAS - SV(MOD-SP4): 48.8 ML
BH CV ECHO MEAS - SV(TEICH): 86 ML
BH CV ECHO MEAS - TR MAX VEL: 243 CM/SEC
BH CV ECHO MEASUREMENTS AVERAGE E/E' RATIO: 12.24
LEFT ATRIUM VOLUME INDEX: 19.7 ML/M2
LEFT ATRIUM VOLUME: 38 CM3

## 2019-10-01 PROCEDURE — 94726 PLETHYSMOGRAPHY LUNG VOLUMES: CPT

## 2019-10-01 PROCEDURE — 94060 EVALUATION OF WHEEZING: CPT

## 2019-10-01 PROCEDURE — 94729 DIFFUSING CAPACITY: CPT

## 2019-10-01 PROCEDURE — 94729 DIFFUSING CAPACITY: CPT | Performed by: INTERNAL MEDICINE

## 2019-10-01 PROCEDURE — 93306 TTE W/DOPPLER COMPLETE: CPT

## 2019-10-01 PROCEDURE — 93306 TTE W/DOPPLER COMPLETE: CPT | Performed by: INTERNAL MEDICINE

## 2019-10-01 PROCEDURE — 94726 PLETHYSMOGRAPHY LUNG VOLUMES: CPT | Performed by: INTERNAL MEDICINE

## 2019-10-01 PROCEDURE — 94060 EVALUATION OF WHEEZING: CPT | Performed by: INTERNAL MEDICINE

## 2019-10-01 RX ORDER — ALBUTEROL SULFATE 2.5 MG/3ML
2.5 SOLUTION RESPIRATORY (INHALATION) ONCE
Status: COMPLETED | OUTPATIENT
Start: 2019-10-01 | End: 2019-10-01

## 2019-10-01 RX ADMIN — ALBUTEROL SULFATE 2.5 MG: 2.5 SOLUTION RESPIRATORY (INHALATION) at 15:59

## 2019-10-25 ENCOUNTER — OFFICE VISIT (OUTPATIENT)
Dept: OBSTETRICS AND GYNECOLOGY | Facility: CLINIC | Age: 49
End: 2019-10-25

## 2019-10-25 VITALS
WEIGHT: 186 LBS | HEIGHT: 66 IN | BODY MASS INDEX: 29.89 KG/M2 | DIASTOLIC BLOOD PRESSURE: 84 MMHG | SYSTOLIC BLOOD PRESSURE: 126 MMHG

## 2019-10-25 DIAGNOSIS — C73 THYROID CANCER (HCC): ICD-10-CM

## 2019-10-25 DIAGNOSIS — F32.9 REACTIVE DEPRESSION: ICD-10-CM

## 2019-10-25 DIAGNOSIS — Z01.419 WELL WOMAN EXAM WITH ROUTINE GYNECOLOGICAL EXAM: Primary | ICD-10-CM

## 2019-10-25 DIAGNOSIS — D86.9 SARCOIDOSIS: ICD-10-CM

## 2019-10-25 DIAGNOSIS — Z12.31 ENCOUNTER FOR SCREENING MAMMOGRAM FOR BREAST CANCER: ICD-10-CM

## 2019-10-25 DIAGNOSIS — Z80.0 FAMILY HISTORY OF COLON CANCER: ICD-10-CM

## 2019-10-25 PROCEDURE — 99396 PREV VISIT EST AGE 40-64: CPT | Performed by: NURSE PRACTITIONER

## 2019-10-25 RX ORDER — ALPRAZOLAM 0.25 MG/1
0.25 TABLET ORAL 3 TIMES DAILY PRN
Refills: 1 | COMMUNITY
Start: 2019-08-23 | End: 2022-02-10

## 2019-10-25 RX ORDER — PREDNISONE 1 MG/1
5 TABLET ORAL DAILY
Refills: 0 | COMMUNITY
Start: 2019-10-08 | End: 2023-04-04

## 2019-10-25 RX ORDER — BUPROPION HYDROCHLORIDE 150 MG/1
150 TABLET ORAL DAILY
Qty: 30 TABLET | Refills: 12 | Status: SHIPPED | OUTPATIENT
Start: 2019-10-25 | End: 2020-10-27 | Stop reason: SDUPTHER

## 2019-10-25 NOTE — PROGRESS NOTES
"Subjective     Ines Rowland is a 48 y.o. female    Patient is here for yearly checkup she has no GYN complaints.  She is currently on a steroid for sarcoidosis.      Gynecologic Exam   The patient's pertinent negatives include no pelvic pain, vaginal bleeding or vaginal discharge. The patient is experiencing no pain. Pertinent negatives include no abdominal pain, anorexia, back pain, chills, constipation, diarrhea, discolored urine, dysuria, fever, flank pain, frequency, headaches, hematuria, joint pain, joint swelling, nausea, painful intercourse, rash, sore throat, urgency or vomiting. She is sexually active. She uses hysterectomy for contraception.         /84   Ht 167.6 cm (66\")   Wt 84.4 kg (186 lb)   LMP 10/12/2005 (Approximate) Comment: Pelvic pain  BMI 30.02 kg/m²     Outpatient Encounter Medications as of 10/25/2019   Medication Sig Dispense Refill   • buPROPion XL (WELLBUTRIN XL) 150 MG 24 hr tablet Take 1 tablet by mouth Daily. 30 tablet 12   • calcitriol (ROCALTROL) 0.25 MCG capsule Take 0.25 mcg by mouth 2 (Two) Times a Day.     • calcium carbonate (OS-TIMOTEO) 600 MG tablet Take 600 mg by mouth 2 (Two) Times a Day.     • levothyroxine (SYNTHROID, LEVOTHROID) 150 MCG tablet Take 150 mcg by mouth Daily.     • [DISCONTINUED] buPROPion XL (WELLBUTRIN XL) 150 MG 24 hr tablet Take 1 tablet by mouth Daily. 30 tablet 12   • ALPRAZolam (XANAX) 0.25 MG tablet   1   • predniSONE (DELTASONE) 20 MG tablet   0     No facility-administered encounter medications on file as of 10/25/2019.        Surgical History  Past Surgical History:   Procedure Laterality Date   • HYSTERECTOMY      without BSO   • KNEE ARTHROPLASTY     • TOTAL THYROIDECTOMY     • WISDOM TOOTH EXTRACTION         Family History  Family History   Problem Relation Age of Onset   • Prostate cancer Father 72   • Arthritis Mother    • Arthritis Brother    • Colon cancer Paternal Uncle 50   • Thyroid cancer Other    • Breast cancer Neg Hx    • " Ovarian cancer Neg Hx    • Uterine cancer Neg Hx    • Melanoma Neg Hx        The following portions of the patient's history were reviewed and updated as appropriate: allergies, current medications, past family history, past medical history, past social history, past surgical history and problem list.    Review of Systems   Constitutional: Negative for activity change, appetite change, chills, diaphoresis, fatigue, fever, unexpected weight gain and unexpected weight loss.   HENT: Negative for congestion, dental problem, drooling, ear discharge, ear pain, facial swelling, hearing loss, mouth sores, nosebleeds, postnasal drip, rhinorrhea, sinus pressure, sneezing, sore throat, swollen glands, tinnitus, trouble swallowing and voice change.    Eyes: Negative for blurred vision, double vision, photophobia, pain, discharge, redness, itching and visual disturbance.   Respiratory: Negative for apnea, cough, choking, chest tightness, shortness of breath, wheezing and stridor.    Cardiovascular: Negative for chest pain, palpitations and leg swelling.   Gastrointestinal: Negative for abdominal distention, abdominal pain, anal bleeding, anorexia, blood in stool, constipation, diarrhea, nausea, rectal pain, vomiting, GERD and indigestion.   Endocrine: Negative for cold intolerance, heat intolerance, polydipsia, polyphagia and polyuria.   Genitourinary: Negative for amenorrhea, breast discharge, breast lump, breast pain, decreased libido, decreased urine volume, difficulty urinating, dyspareunia, dysuria, flank pain, frequency, genital sores, hematuria, menstrual problem, pelvic pain, pelvic pressure, urgency, urinary incontinence, vaginal bleeding, vaginal discharge and vaginal pain.   Musculoskeletal: Negative for arthralgias, back pain, gait problem, joint pain, joint swelling, myalgias, neck pain, neck stiffness and bursitis.   Skin: Negative for color change, dry skin and rash.   Allergic/Immunologic: Negative for  environmental allergies, food allergies and immunocompromised state.   Neurological: Negative for dizziness, tremors, seizures, syncope, facial asymmetry, speech difficulty, weakness, light-headedness, numbness, headache, memory problem and confusion.   Hematological: Negative for adenopathy. Does not bruise/bleed easily.   Psychiatric/Behavioral: Negative for agitation, behavioral problems, decreased concentration, dysphoric mood, hallucinations, self-injury, sleep disturbance, suicidal ideas, negative for hyperactivity, depressed mood and stress. The patient is not nervous/anxious.        Objective   Physical Exam   Constitutional: She is oriented to person, place, and time. She appears well-developed and well-nourished.   HENT:   Head: Normocephalic and atraumatic.   Right Ear: External ear normal.   Left Ear: External ear normal.   Eyes: Conjunctivae and EOM are normal. Right eye exhibits no discharge. Left eye exhibits no discharge. No scleral icterus.   Neck: Normal range of motion. Neck supple. Carotid bruit is not present. No thyromegaly present.   Cardiovascular: Regular rhythm and normal heart sounds.   No murmur heard.  Pulmonary/Chest: Effort normal and breath sounds normal. No respiratory distress. Right breast exhibits no inverted nipple, no mass, no nipple discharge, no skin change and no tenderness. Left breast exhibits no inverted nipple, no mass, no nipple discharge, no skin change and no tenderness. Breasts are symmetrical. There is no breast swelling.   Abdominal: Soft. Bowel sounds are normal. She exhibits no distension and no mass. There is no tenderness. There is no guarding. No hernia. Hernia confirmed negative in the right inguinal area and confirmed negative in the left inguinal area.   Genitourinary: Vagina normal. Rectal exam shows no mass. No breast tenderness, discharge or bleeding. There is no rash, tenderness, lesion or injury on the right labia. There is no rash, tenderness, lesion  or injury on the left labia. No erythema or bleeding in the vagina. No signs of injury around the vagina. No vaginal discharge found.   Genitourinary Comments: Cervix, Uterus are surgically absent.  Urethra and urethral meatus normal  Bladder, normal no prolapse  Perineum and anus examined and without lesions   Musculoskeletal: Normal range of motion. She exhibits no edema or tenderness.   Lymphadenopathy:        Head (right side): No submental, no submandibular, no tonsillar, no preauricular, no posterior auricular and no occipital adenopathy present.        Head (left side): No submental, no submandibular, no tonsillar, no preauricular, no posterior auricular and no occipital adenopathy present.     She has no cervical adenopathy.        Right cervical: No superficial cervical, no deep cervical and no posterior cervical adenopathy present.       Left cervical: No superficial cervical, no deep cervical and no posterior cervical adenopathy present.     She has no axillary adenopathy.        Right: No inguinal adenopathy present.        Left: No inguinal adenopathy present.   Neurological: She is alert and oriented to person, place, and time. She exhibits normal muscle tone. Coordination normal.   Skin: Skin is warm and dry. No bruising and no rash noted. No erythema.   Psychiatric: She has a normal mood and affect. Her behavior is normal. Judgment and thought content normal.   Nursing note and vitals reviewed.      Assessment/Plan   Ines was seen today for gynecologic exam.    Diagnoses and all orders for this visit:    Well woman exam with routine gynecological exam  Normal GYN exam. Will have lab work at PCP. Encouraged SBE, pt is aware how to do self breast exam and the importance of same. Discussed weight management and importance of maintaining a healthy weight. Discussed Vitamin D intake and the importance of adequate vitamin D for both Bone Health and a healthy immune system.  Discussed Daily exercise and  the importance of same, in regards to a healthy heart as well as helping to maintain her weight and improving her mental health.  BMI 30. Mammogram will be scheduled at [facility].  Pap smear is not done per ASCCP guidelines.    Encounter for screening mammogram for breast cancer  Comments:  Patient will have mammogram at [facility].  Orders:  -     Mammo Screening Digital Tomosynthesis Bilateral With CAD; Future    Thyroid cancer (CMS/HCC)  Comments:  Patient was followed at [place] and they have released her now to Dr. Panda, who follows her labs.    Reactive depression  Comments:  Pt. is doing well on Wellbutrin. Rf given.  Orders:  -     buPROPion XL (WELLBUTRIN XL) 150 MG 24 hr tablet; Take 1 tablet by mouth Daily.    Family history of colon cancer  Pt has a family history of colon cancer. We discussed Genetic screening that can be done to see if she carries the Gene for Breast, Ovarian, Colon, Melanoma, Uterine and Pancreatic Cancers. We discussed if positive she will have free Genetic counseling through [facility]. We also discussed if she does have the positive gene she can have more testing yearly, and even surgery if desired.    Sarcoidosis  Patient is currently on a steroid pack and is doing much better.       Patient's Body mass index is 30.02 kg/m². BMI is above normal parameters. Recommendations include: educational material, exercise counseling and nutrition counseling.      Lynette Gallegos, APRN  10/25/2019

## 2019-10-25 NOTE — PATIENT INSTRUCTIONS

## 2019-11-22 ENCOUNTER — APPOINTMENT (OUTPATIENT)
Dept: MAMMOGRAPHY | Facility: HOSPITAL | Age: 49
End: 2019-11-22

## 2019-12-31 ENCOUNTER — HOSPITAL ENCOUNTER (OUTPATIENT)
Dept: MAMMOGRAPHY | Facility: HOSPITAL | Age: 49
Discharge: HOME | End: 2019-12-31
Admitting: NURSE PRACTITIONER

## 2019-12-31 DIAGNOSIS — Z12.31 ENCOUNTER FOR SCREENING MAMMOGRAM FOR BREAST CANCER: ICD-10-CM

## 2019-12-31 PROCEDURE — 77063 BREAST TOMOSYNTHESIS BI: CPT

## 2019-12-31 PROCEDURE — 77067 SCR MAMMO BI INCL CAD: CPT

## 2020-10-27 ENCOUNTER — OFFICE VISIT (OUTPATIENT)
Dept: OBSTETRICS AND GYNECOLOGY | Facility: CLINIC | Age: 50
End: 2020-10-27

## 2020-10-27 VITALS
BODY MASS INDEX: 30.86 KG/M2 | SYSTOLIC BLOOD PRESSURE: 112 MMHG | WEIGHT: 192 LBS | DIASTOLIC BLOOD PRESSURE: 80 MMHG | HEIGHT: 66 IN

## 2020-10-27 DIAGNOSIS — F32.9 REACTIVE DEPRESSION: ICD-10-CM

## 2020-10-27 DIAGNOSIS — Z12.31 ENCOUNTER FOR SCREENING MAMMOGRAM FOR BREAST CANCER: ICD-10-CM

## 2020-10-27 DIAGNOSIS — R14.0 BLOATING: ICD-10-CM

## 2020-10-27 DIAGNOSIS — R74.8 ELEVATED LIVER ENZYMES: ICD-10-CM

## 2020-10-27 DIAGNOSIS — N90.4 LICHEN SCLEROSUS OF FEMALE GENITALIA: ICD-10-CM

## 2020-10-27 DIAGNOSIS — N95.1 MENOPAUSAL SYMPTOMS: ICD-10-CM

## 2020-10-27 DIAGNOSIS — Z01.419 WELL WOMAN EXAM WITH ROUTINE GYNECOLOGICAL EXAM: Primary | ICD-10-CM

## 2020-10-27 DIAGNOSIS — N89.8 VAGINAL DRYNESS: ICD-10-CM

## 2020-10-27 DIAGNOSIS — E55.9 VITAMIN D DEFICIENCY: ICD-10-CM

## 2020-10-27 DIAGNOSIS — J98.51 FIBROSING MEDIASTINITIS: ICD-10-CM

## 2020-10-27 DIAGNOSIS — C73 THYROID CANCER (HCC): ICD-10-CM

## 2020-10-27 PROCEDURE — 99396 PREV VISIT EST AGE 40-64: CPT | Performed by: NURSE PRACTITIONER

## 2020-10-27 RX ORDER — ALBUTEROL SULFATE 90 UG/1
2 AEROSOL, METERED RESPIRATORY (INHALATION) EVERY 4 HOURS PRN
COMMUNITY
Start: 2020-08-07 | End: 2021-08-08

## 2020-10-27 RX ORDER — CLOBETASOL PROPIONATE 0.5 MG/G
CREAM TOPICAL 2 TIMES DAILY
Qty: 45 G | Refills: 3 | Status: SHIPPED | OUTPATIENT
Start: 2020-10-27 | End: 2020-12-28

## 2020-10-27 RX ORDER — BUPROPION HYDROCHLORIDE 150 MG/1
150 TABLET ORAL DAILY
Qty: 30 TABLET | Refills: 12 | Status: SHIPPED | OUTPATIENT
Start: 2020-10-27 | End: 2022-02-10 | Stop reason: SDUPTHER

## 2020-10-27 NOTE — PATIENT INSTRUCTIONS
Lichen Sclerosus  Lichen sclerosus is a skin problem. It can happen on any part of the body, but it commonly involves the anal or genital areas. It can cause itching and discomfort in these areas. Treatment can help to control symptoms. When the genital area is affected, getting treatment is important because the condition can cause scarring that may lead to other problems.  What are the causes?  The cause of this condition is not known. It may be related to an overactive immune system or a lack of certain hormones. Lichen sclerosus is not an infection or a fungus, and it is not passed from one person to another (not contagious).  What increases the risk?  This condition is more likely to develop in women, usually after menopause.  What are the signs or symptoms?  Symptoms of this condition include:  · Thin, wrinkled, white areas on the skin.  · Thickened white areas on the skin.  · Red and swollen patches (lesions) on the skin.  · Tears or cracks in the skin.  · Bruising.  · Blood blisters.  · Severe itching.  · Pain, itching, or burning when urinating.  Constipation is also common in people with lichen sclerosus.  How is this diagnosed?  This condition may be diagnosed with a physical exam. In some cases, a tissue sample (biopsy sample) may be removed to be looked at under a microscope.  How is this treated?  This condition is usually treated with medicated creams or ointments (topical steroids) that are applied over the affected areas. In some cases, treatment may also include medicines that are taken by mouth. Surgery may be needed in more severe cases that are causing problems such as scarring.  Follow these instructions at home:  · Take or use over-the-counter and prescription medicines only as told by your health care provider.  · Use creams or ointments as told by your health care provider.  · Do not scratch the affected areas of skin.  · If you are a woman, be sure to keep the vaginal area as clean and dry  as possible.  · Clean the affected area of skin gently with water. Avoid using rough towels or toilet paper.  · Keep all follow-up visits as told by your health care provider. This is important.  Contact a health care provider if:  · You have increasing redness, swelling, or pain in the affected area.  · You have fluid, blood, or pus coming from the affected area.  · You have new lesions on your skin.  · You have a fever.  · You have pain during sex.  Summary  · Lichen sclerosus is a skin problem. When the genital area is affected, getting treatment is important because the condition can cause scarring that may lead to other problems.  · This condition is usually treated with medicated creams or ointments (topical steroids) that are applied over the affected areas.  · Take or use over-the-counter and prescription medicines only as told by your health care provider.  · Contact a health care provider if you have new lesions on your skin, have pain during sex, or have increasing redness, swelling, or pain in the affected area.  · Keep all follow-up visits as told by your health care provider. This is important.  This information is not intended to replace advice given to you by your health care provider. Make sure you discuss any questions you have with your health care provider.  Document Released: 05/09/2012 Document Revised: 05/02/2019 Document Reviewed: 05/02/2019  [employer] Patient Education © 2020 [employer]  BMI for Adults  What is BMI?  Body mass index (BMI) is a number that is calculated from a person's weight and height. BMI can help estimate how much of a person's weight is composed of fat. BMI does not measure body fat directly. Rather, it is an alternative to procedures that directly measure body fat, which can be difficult and expensive.  BMI can help identify people who may be at higher risk for certain medical problems.  What are BMI measurements used for?  BMI is used as a screening tool to identify  "possible weight problems. It helps determine whether a person is obese, overweight, a healthy weight, or underweight.  BMI is useful for:  · Identifying a weight problem that may be related to a medical condition or may increase the risk for medical problems.  · Promoting changes, such as changes in diet and exercise, to help reach a healthy weight. BMI screening can be repeated to see if these changes are working.  How is BMI calculated?  BMI involves measuring your weight in relation to your height. Both height and weight are measured, and the BMI is calculated from those numbers. This can be done either in English ([place] or metric measurements. Note that charts and online BMI calculators are available to help you find your BMI quickly and easily without having to do these calculations yourself.  To calculate your BMI in English ([place] measurements:    1. Measure your weight in pounds (lb).  2. Multiply the number of pounds by 703.  ? For example, for a person who weighs 180 lb, multiply that number by 703, which equals 126,540.  3. Measure your height in inches. Then multiply that number by itself to get a measurement called \"inches squared.\"  ? For example, for a person who is 70 inches tall, the \"inches squared\" measurement is 70 inches x 70 inches, which equals 4,900 inches squared.  4. Divide the total from step 2 (number of lb x 703) by the total from step 3 (inches squared): 126,540 ÷ 4,900 = 25.8. This is your BMI.  To calculate your BMI in metric measurements:  1. Measure your weight in kilograms (kg).  2. Measure your height in meters (m). Then multiply that number by itself to get a measurement called \"meters squared.\"  ? For example, for a person who is 1.75 m tall, the \"meters squared\" measurement is 1.75 m x 1.75 m, which is equal to 3.1 meters squared.  3. Divide the number of kilograms (your weight) by the meters squared number. In this example: 70 ÷ 3.1 = 22.6. This is your BMI.  What do the " results mean?  BMI charts are used to identify whether you are underweight, normal weight, overweight, or obese. The following guidelines will be used:  · Underweight: BMI less than 18.5.  · Normal weight: BMI between 18.5 and 24.9.  · Overweight: BMI between 25 and 29.9.  · Obese: BMI of 30 or above.  Keep these notes in mind:  · Weight includes both fat and muscle, so someone with a muscular build, such as an athlete, may have a BMI that is higher than 24.9. In cases like these, BMI is not an accurate measure of body fat.  · To determine if excess body fat is the cause of a BMI of 25 or higher, further assessments may need to be done by a health care provider.  · BMI is usually interpreted in the same way for men and women.  Where to find more information  For more information about BMI, including tools to quickly calculate your BMI, go to these websites:  · Centers for Disease Control and Prevention: www.cdc.gov  · American Heart Association: www.heart.org  · National Heart, Lung, and Blood [place]: www.nhlbi.nih.gov  Summary  · Body mass index (BMI) is a number that is calculated from a person's weight and height.  · BMI may help estimate how much of a person's weight is composed of fat. BMI can help identify those who may be at higher risk for certain medical problems.  · BMI can be measured using English measurements or metric measurements.  · BMI charts are used to identify whether you are underweight, normal weight, overweight, or obese.  This information is not intended to replace advice given to you by your health care provider. Make sure you discuss any questions you have with your health care provider.  Document Released: 08/29/2005 Document Revised: 09/09/2020 Document Reviewed: 07/17/2020  Elsevier Patient Education © 2020 [employer]

## 2020-10-27 NOTE — PROGRESS NOTES
"Subjective     Ines Rowland is a 49 y.o. female    Patient is here today for yearly checkup.  She has complaints of bloating in her lower abdomen.  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  She has an appointment for stents to be placed in her lung at [facility] in December.  Has been having mild shortness of breath.    Gynecologic Exam  The patient's pertinent negatives include no pelvic pain, vaginal bleeding or vaginal discharge. The patient is experiencing no pain. Associated symptoms include constipation. Pertinent negatives include no abdominal pain, anorexia, back pain, chills, diarrhea, discolored urine, dysuria, fever, flank pain, frequency, headaches, hematuria, joint pain, joint swelling, nausea, painful intercourse, rash, sore throat, urgency or vomiting. Nothing aggravates the symptoms. She has tried nothing for the symptoms. She is sexually active. She uses hysterectomy for contraception.         /80   Ht 167.6 cm (65.98\")   Wt 87.1 kg (192 lb)   LMP 10/12/2005 (Approximate) Comment: Pelvic pain  BMI 31.00 kg/m²     Outpatient Encounter Medications as of 10/27/2020   Medication Sig Dispense Refill   • albuterol sulfate  (90 Base) MCG/ACT inhaler Inhale 2 puffs Every 4 (Four) Hours As Needed.     • ALPRAZolam (XANAX) 0.25 MG tablet   1   • buPROPion XL (Wellbutrin XL) 150 MG 24 hr tablet Take 1 tablet by mouth Daily. 30 tablet 12   • calcitriol (ROCALTROL) 0.25 MCG capsule Take 0.25 mcg by mouth 2 (Two) Times a Day.     • calcium carbonate (OS-TIMOTEO) 600 MG tablet Take 600 mg by mouth 2 (Two) Times a Day.     • fluticasone-salmeterol (ADVAIR) 250-50 MCG/DOSE DISKUS Inhale 1 puff.     • levothyroxine (SYNTHROID, LEVOTHROID) 150 MCG tablet Take 150 mcg by mouth Daily.     • predniSONE (DELTASONE) 20 MG tablet   0   • [DISCONTINUED] buPROPion XL (WELLBUTRIN XL) 150 MG 24 hr tablet Take 1 tablet by mouth Daily. 30 tablet 12   • clobetasol (Temovate) 0.05 % cream " Apply  topically to the appropriate area as directed 2 (Two) Times a Day. 45 g 3     No facility-administered encounter medications on file as of 10/27/2020.        Surgical History  Past Surgical History:   Procedure Laterality Date   • HYSTERECTOMY      without BSO   • KNEE ARTHROPLASTY     • TOTAL THYROIDECTOMY     • WISDOM TOOTH EXTRACTION         Family History  Family History   Problem Relation Age of Onset   • Prostate cancer Father 72   • Arthritis Mother    • Arthritis Brother    • Colon cancer Paternal Uncle 50   • Thyroid cancer Other    • No Known Problems Sister    • No Known Problems Daughter    • No Known Problems Son    • No Known Problems Maternal Grandmother    • No Known Problems Paternal Grandmother    • No Known Problems Maternal Aunt    • No Known Problems Paternal Aunt    • Breast cancer Neg Hx    • Ovarian cancer Neg Hx    • Uterine cancer Neg Hx    • Melanoma Neg Hx    • BRCA 1/2 Neg Hx    • Endometrial cancer Neg Hx        The following portions of the patient's history were reviewed and updated as appropriate: allergies, current medications, past family history, past medical history, past social history, past surgical history and problem list.    Review of Systems   Constitutional: Negative for activity change, appetite change, chills, diaphoresis, fatigue, fever, unexpected weight gain and unexpected weight loss.   HENT: Negative for congestion, dental problem, drooling, ear discharge, ear pain, facial swelling, hearing loss, mouth sores, nosebleeds, postnasal drip, rhinorrhea, sinus pressure, sneezing, sore throat, swollen glands, tinnitus, trouble swallowing and voice change.    Eyes: Negative for blurred vision, double vision, photophobia, pain, discharge, redness, itching and visual disturbance.   Respiratory: Positive for shortness of breath. Negative for apnea, cough, choking, chest tightness, wheezing and stridor.    Cardiovascular: Negative for chest pain, palpitations and leg  swelling.   Gastrointestinal: Positive for constipation. Negative for abdominal distention, abdominal pain, anal bleeding, anorexia, blood in stool, diarrhea, nausea, rectal pain, vomiting, GERD and indigestion.   Endocrine: Positive for heat intolerance. Negative for cold intolerance, polydipsia, polyphagia and polyuria.   Genitourinary: Negative for amenorrhea, breast discharge, breast lump, breast pain, decreased libido, decreased urine volume, difficulty urinating, dyspareunia, dysuria, flank pain, frequency, genital sores, hematuria, menstrual problem, pelvic pain, pelvic pressure, urgency, urinary incontinence, vaginal bleeding, vaginal discharge and vaginal pain.   Musculoskeletal: Negative for arthralgias, back pain, gait problem, joint pain, joint swelling, myalgias, neck pain, neck stiffness and bursitis.   Skin: Negative for color change, dry skin and rash.   Allergic/Immunologic: Negative for environmental allergies, food allergies and immunocompromised state.   Neurological: Negative for dizziness, tremors, seizures, syncope, facial asymmetry, speech difficulty, weakness, light-headedness, numbness, headache, memory problem and confusion.   Hematological: Negative for adenopathy. Does not bruise/bleed easily.   Psychiatric/Behavioral: Positive for depressed mood. Negative for agitation, behavioral problems, decreased concentration, dysphoric mood, hallucinations, self-injury, sleep disturbance, suicidal ideas, negative for hyperactivity and stress. The patient is not nervous/anxious.        Objective   Physical Exam  Vitals signs and nursing note reviewed. Exam conducted with a chaperone present.   Constitutional:       Appearance: She is well-developed.   HENT:      Head: Normocephalic and atraumatic.      Right Ear: External ear normal.      Left Ear: External ear normal.   Eyes:      General: No scleral icterus.        Right eye: No discharge.         Left eye: No discharge.      Conjunctiva/sclera:  Conjunctivae normal.   Neck:      Musculoskeletal: Normal range of motion and neck supple.      Thyroid: No thyromegaly.      Vascular: No carotid bruit.   Cardiovascular:      Rate and Rhythm: Regular rhythm.      Heart sounds: Normal heart sounds. No murmur.   Pulmonary:      Effort: Pulmonary effort is normal. No respiratory distress.      Breath sounds: Normal breath sounds.   Chest:      Breasts: Breasts are symmetrical.         Right: No inverted nipple, mass, nipple discharge, skin change or tenderness.         Left: No inverted nipple, mass, nipple discharge, skin change or tenderness.   Abdominal:      General: Bowel sounds are normal. There is no distension.      Palpations: Abdomen is soft. There is no mass.      Tenderness: There is no abdominal tenderness. There is no guarding.      Hernia: No hernia is present. There is no hernia in the left inguinal area or right inguinal area.   Genitourinary:     General: Normal vulva.      Exam position: Lithotomy position.      Labia:         Right: No rash, tenderness, lesion or injury.         Left: No rash, tenderness, lesion or injury.       Vagina: Normal. No signs of injury. No vaginal discharge, erythema or bleeding.      Rectum: Normal. No mass.          Comments: Cervix, Uterus are surgically absent.  Urethra and urethral meatus normal  Bladder, normal no prolapse  Perineum and anus examined and without lesions  Musculoskeletal: Normal range of motion.         General: No tenderness.   Lymphadenopathy:      Head:      Right side of head: No submental, submandibular, tonsillar, preauricular, posterior auricular or occipital adenopathy.      Left side of head: No submental, submandibular, tonsillar, preauricular, posterior auricular or occipital adenopathy.      Cervical: No cervical adenopathy.      Right cervical: No superficial, deep or posterior cervical adenopathy.     Left cervical: No superficial, deep or posterior cervical adenopathy.      Lower  Body: No right inguinal adenopathy. No left inguinal adenopathy.   Skin:     General: Skin is warm and dry.      Findings: No bruising, erythema or rash.   Neurological:      Mental Status: She is alert and oriented to person, place, and time.      Motor: No abnormal muscle tone.      Coordination: Coordination normal.   Psychiatric:         Behavior: Behavior normal.         Thought Content: Thought content normal.         Judgment: Judgment normal.         Assessment/Plan   Diagnoses and all orders for this visit:    1. Well woman exam with routine gynecological exam (Primary)  Normal GYN exam. Will have lab work at PCP. Encouraged SBE, pt is aware how to do self breast exam and the importance of same. Discussed weight management and importance of maintaining a healthy weight. Discussed Vitamin D intake and the importance of adequate vitamin D for both Bone Health and a healthy immune system.  Discussed Daily exercise and the importance of same, in regards to a healthy heart as well as helping to maintain her weight and improving her mental health.  BMI 31.  Mammogram will be scheduled at [facility].  Pap smear is not done per ASCCP guidelines.    2. Thyroid cancer (CMS/[facility])  Comments:  Patient has history of thyroid cancer.  She is followed at [facility].    3. Encounter for screening mammogram for breast cancer  Comments:  Patient will have mammogram at [facility].  Orders:  -     Mammo Screening Digital Tomosynthesis Bilateral With CAD; Future    4. Fibrosing mediastinitis  Comments:  Patient has recently been diagnosed with fibrosing mediastinitis.  She is seeing someone at [facility] and is going to have stents placed in her lungs.    5. Lichen sclerosus of female genitalia  Comments:  She has an area at the top of the labia about the clitoral area that appears to be lichen sclerosis.  She is given Temovate cream.  Will RTO in 6 weeks.  Orders:  -     clobetasol (Temovate) 0.05 %  cream; Apply  topically to the appropriate area as directed 2 (Two) Times a Day.  Dispense: 45 g; Refill: 3    6. Reactive depression  Comments:  Pt. is doing well on Wellbutrin. Rf given.  Orders:  -     buPROPion XL (Wellbutrin XL) 150 MG 24 hr tablet; Take 1 tablet by mouth Daily.  Dispense: 30 tablet; Refill: 12    7. Vaginal dryness  Comments:  Patient is having vaginal dryness.  She will try coconut oil daily.    8. Menopausal symptoms  Comments:  Patient is having menopausal symptoms.  She will have hormones drawn today.  Orders:  -     Cortisol  -     DHEA-Sulfate  -     Estradiol  -     Follicle Stimulating Hormone  -     Luteinizing Hormone  -     Progesterone  -     Testosterone    9. Vitamin D deficiency  Comments:  Will have her vitamin D drawn today.  Refill is given.  Orders:  -     Vitamin D 25 Hydroxy    10. Elevated liver enzymes  Comments:  Patient has CMP at her PCP and her liver enzymes were elevated.  She has been taking NyQuil and would like to have that repeated today.  Orders:  -     Comprehensive Metabolic Panel    11. Bloating  Comments:  Patient will return for pelvic ultrasound.         Patient's Body mass index is 31 kg/m². BMI is above normal parameters. Recommendations include: educational material, exercise counseling and nutrition counseling.      Lynette Gallegos, APRN  10/27/2020

## 2020-10-28 LAB
25(OH)D3+25(OH)D2 SERPL-MCNC: 44.9 NG/ML (ref 30–100)
ALBUMIN SERPL-MCNC: 4.5 G/DL (ref 3.5–5.2)
ALBUMIN/GLOB SERPL: 1.5 G/DL
ALP SERPL-CCNC: 101 U/L (ref 39–117)
ALT SERPL-CCNC: 28 U/L (ref 1–33)
AST SERPL-CCNC: 26 U/L (ref 1–32)
BILIRUB SERPL-MCNC: 0.2 MG/DL (ref 0–1.2)
BUN SERPL-MCNC: 18 MG/DL (ref 6–20)
BUN/CREAT SERPL: 13.8 (ref 7–25)
CALCIUM SERPL-MCNC: 9.6 MG/DL (ref 8.6–10.5)
CHLORIDE SERPL-SCNC: 102 MMOL/L (ref 98–107)
CO2 SERPL-SCNC: 28.7 MMOL/L (ref 22–29)
CORTIS SERPL-MCNC: 0.8 UG/DL
CREAT SERPL-MCNC: 1.3 MG/DL (ref 0.57–1)
DHEA-S SERPL-MCNC: 20.3 UG/DL (ref 41.2–243.7)
ESTRADIOL SERPL-MCNC: <5 PG/ML
FSH SERPL-ACNC: 40.8 MIU/ML
GLOBULIN SER CALC-MCNC: 3 GM/DL
GLUCOSE SERPL-MCNC: 82 MG/DL (ref 65–99)
LH SERPL-ACNC: 44 MIU/ML
POTASSIUM SERPL-SCNC: 3.8 MMOL/L (ref 3.5–5.2)
PROGEST SERPL-MCNC: <0.1 NG/ML
PROT SERPL-MCNC: 7.5 G/DL (ref 6–8.5)
SODIUM SERPL-SCNC: 140 MMOL/L (ref 136–145)
TESTOST SERPL-MCNC: <3 NG/DL (ref 8–48)

## 2020-12-28 ENCOUNTER — OFFICE VISIT (OUTPATIENT)
Dept: OBSTETRICS AND GYNECOLOGY | Facility: CLINIC | Age: 50
End: 2020-12-28

## 2020-12-28 VITALS
HEIGHT: 66 IN | BODY MASS INDEX: 30.7 KG/M2 | DIASTOLIC BLOOD PRESSURE: 82 MMHG | WEIGHT: 191 LBS | SYSTOLIC BLOOD PRESSURE: 126 MMHG

## 2020-12-28 DIAGNOSIS — R10.2 PELVIC PAIN: Primary | ICD-10-CM

## 2020-12-28 DIAGNOSIS — R11.2 INTRACTABLE VOMITING WITH NAUSEA, UNSPECIFIED VOMITING TYPE: ICD-10-CM

## 2020-12-28 DIAGNOSIS — R10.31 RIGHT LOWER QUADRANT ABDOMINAL PAIN: ICD-10-CM

## 2020-12-28 PROCEDURE — 99214 OFFICE O/P EST MOD 30 MIN: CPT | Performed by: NURSE PRACTITIONER

## 2020-12-28 RX ORDER — ASPIRIN 81 MG/1
81 TABLET, COATED ORAL DAILY
COMMUNITY
Start: 2020-12-03 | End: 2021-04-16

## 2020-12-28 RX ORDER — CYCLOBENZAPRINE HCL 10 MG
10 TABLET ORAL 3 TIMES DAILY PRN
COMMUNITY
Start: 2020-10-15

## 2020-12-28 RX ORDER — CLOPIDOGREL BISULFATE 75 MG/1
TABLET ORAL
Status: ON HOLD | COMMUNITY
Start: 2020-12-03 | End: 2021-01-14

## 2020-12-28 RX ORDER — PROMETHAZINE HYDROCHLORIDE 25 MG/1
25 TABLET ORAL EVERY 6 HOURS PRN
Qty: 30 TABLET | Refills: 2 | Status: ON HOLD | OUTPATIENT
Start: 2020-12-28 | End: 2021-01-14

## 2020-12-28 NOTE — PROGRESS NOTES
"Subjective     Ines Rowland is a 50 y.o. female    Patient comes in today for discussion of hormones.  She was found to be menopausal at her yearly checkup.  And she has been having nausea and vomiting and she thought it might be related to same.  After discussion she is also been having a low-grade fever of 100.1 and has had nausea and vomiting since Christmas Eve.  She has also had pain in the right upper and lower quadrants.    Abdominal Pain  This is a new problem. The current episode started in the past 7 days. The onset quality is gradual. The problem occurs constantly. The problem has been gradually worsening. The pain is located in the RLQ, RUQ and right flank. The pain is at a severity of 4/10. The pain is moderate. The quality of the pain is aching, a sensation of fullness and sharp. The abdominal pain radiates to the right shoulder. Associated symptoms include anorexia, constipation, a fever, nausea and vomiting. Pertinent negatives include no arthralgias, belching, diarrhea, dysuria, flatus, frequency, headaches, hematochezia, hematuria, melena, myalgias or weight loss. The pain is aggravated by eating. The pain is relieved by nothing. She has tried antacids for the symptoms. The treatment provided no relief.         /82   Ht 167.6 cm (65.98\")   Wt 86.6 kg (191 lb)   LMP 10/12/2005 (Approximate) Comment: Pelvic pain  BMI 30.84 kg/m²     Outpatient Encounter Medications as of 12/28/2020   Medication Sig Dispense Refill   • albuterol sulfate  (90 Base) MCG/ACT inhaler Inhale 2 puffs Every 4 (Four) Hours As Needed.     • ALPRAZolam (XANAX) 0.25 MG tablet   1   • Aspirin Low Dose 81 MG EC tablet      • buPROPion XL (Wellbutrin XL) 150 MG 24 hr tablet Take 1 tablet by mouth Daily. 30 tablet 12   • calcitriol (ROCALTROL) 0.25 MCG capsule Take 0.25 mcg by mouth 2 (Two) Times a Day.     • calcium carbonate (OS-TIMOTEO) 600 MG tablet Take 600 mg by mouth 2 (Two) Times a Day.     • clopidogrel " (PLAVIX) 75 MG tablet      • cyclobenzaprine (FLEXERIL) 10 MG tablet      • fluticasone-salmeterol (ADVAIR) 250-50 MCG/DOSE DISKUS Inhale 1 puff.     • levothyroxine (SYNTHROID, LEVOTHROID) 150 MCG tablet Take 150 mcg by mouth Daily.     • predniSONE (DELTASONE) 20 MG tablet   0   • promethazine (PHENERGAN) 25 MG tablet Take 1 tablet by mouth Every 6 (Six) Hours As Needed for Nausea or Vomiting. 30 tablet 2   • [DISCONTINUED] clobetasol (Temovate) 0.05 % cream Apply  topically to the appropriate area as directed 2 (Two) Times a Day. 45 g 3     No facility-administered encounter medications on file as of 12/28/2020.        Surgical History  Past Surgical History:   Procedure Laterality Date   • HYSTERECTOMY      without BSO   • KNEE ARTHROPLASTY     • LUNG SURGERY Right 12/02/2020    Stent placed in right lung   • TOTAL THYROIDECTOMY     • WISDOM TOOTH EXTRACTION         Family History  Family History   Problem Relation Age of Onset   • Prostate cancer Father 72   • Arthritis Mother    • Arthritis Brother    • Colon cancer Paternal Uncle 50   • Thyroid cancer Other    • No Known Problems Sister    • No Known Problems Daughter    • No Known Problems Son    • No Known Problems Maternal Grandmother    • No Known Problems Paternal Grandmother    • No Known Problems Maternal Aunt    • No Known Problems Paternal Aunt    • Breast cancer Neg Hx    • Ovarian cancer Neg Hx    • Uterine cancer Neg Hx    • Melanoma Neg Hx    • BRCA 1/2 Neg Hx    • Endometrial cancer Neg Hx        The following portions of the patient's history were reviewed and updated as appropriate: allergies, current medications, past family history, past medical history, past social history, past surgical history and problem list.    Review of Systems   Constitutional: Positive for fever. Negative for activity change, appetite change, chills, diaphoresis, fatigue, unexpected weight gain and unexpected weight loss.   HENT: Negative for congestion, dental  problem, drooling, ear discharge, ear pain, facial swelling, hearing loss, mouth sores, nosebleeds, postnasal drip, rhinorrhea, sinus pressure, sneezing, sore throat, swollen glands, tinnitus, trouble swallowing and voice change.    Eyes: Negative for blurred vision, double vision, photophobia, pain, discharge, redness, itching and visual disturbance.   Respiratory: Negative for apnea, cough, choking, chest tightness, shortness of breath, wheezing and stridor.    Cardiovascular: Negative for chest pain, palpitations and leg swelling.   Gastrointestinal: Positive for abdominal pain, anorexia, constipation, nausea and vomiting. Negative for abdominal distention, anal bleeding, blood in stool, diarrhea, flatus, hematochezia, melena, rectal pain, GERD and indigestion.   Endocrine: Negative for cold intolerance, heat intolerance, polydipsia, polyphagia and polyuria.   Genitourinary: Positive for pelvic pain. Negative for amenorrhea, breast discharge, breast lump, breast pain, decreased libido, decreased urine volume, difficulty urinating, dyspareunia, dysuria, flank pain, frequency, genital sores, hematuria, menstrual problem, pelvic pressure, urgency, urinary incontinence, vaginal bleeding, vaginal discharge and vaginal pain.   Musculoskeletal: Negative for arthralgias, back pain, gait problem, joint swelling, myalgias, neck pain, neck stiffness and bursitis.   Skin: Negative for color change, dry skin and rash.   Allergic/Immunologic: Negative for environmental allergies, food allergies and immunocompromised state.   Neurological: Negative for dizziness, tremors, seizures, syncope, facial asymmetry, speech difficulty, weakness, light-headedness, numbness, headache, memory problem and confusion.   Hematological: Negative for adenopathy. Does not bruise/bleed easily.   Psychiatric/Behavioral: Negative for agitation, behavioral problems, decreased concentration, dysphoric mood, hallucinations, self-injury, sleep  disturbance, suicidal ideas, negative for hyperactivity, depressed mood and stress. The patient is not nervous/anxious.        Objective   Physical Exam  Vitals signs and nursing note reviewed.   Constitutional:       Appearance: She is well-developed.   HENT:      Head: Normocephalic and atraumatic.   Eyes:      General:         Right eye: No discharge.         Left eye: No discharge.      Conjunctiva/sclera: Conjunctivae normal.   Neck:      Musculoskeletal: Normal range of motion and neck supple.      Thyroid: No thyromegaly.   Cardiovascular:      Rate and Rhythm: Normal rate and regular rhythm.      Heart sounds: Normal heart sounds.   Pulmonary:      Effort: Pulmonary effort is normal.      Breath sounds: Normal breath sounds.   Abdominal:      General: There is no distension.      Palpations: Abdomen is soft. There is no mass.      Tenderness: There is abdominal tenderness. There is right CVA tenderness. There is no left CVA tenderness, guarding or rebound.      Hernia: No hernia is present.   Genitourinary:     Adnexa:         Right: Tenderness present. No mass or fullness.     Skin:     General: Skin is warm and dry.   Neurological:      Mental Status: She is alert and oriented to person, place, and time.   Psychiatric:         Mood and Affect: Mood normal.         Behavior: Behavior normal.         Thought Content: Thought content normal.         Judgment: Judgment normal.         Assessment/Plan   Diagnoses and all orders for this visit:    1. Pelvic pain (Primary)  Comments:  Patient has had pain in the right lower quadrant since Christmas Eve.  Is gradually gotten worse.  Pelvic ultrasound done today shows a hyperechoic area in the right adnexa.  Patient is very tender on exam.  She will have a CT of the abdomen and pelvis to rule out appendicitis.    2. Intractable vomiting with nausea, unspecified vomiting type  Comments:  Patient is given Phenergan to take as needed.  Labs will be drawn today.  She  will have a gallbladder ultrasound and possible HIDA scan scheduled at [facility].  Orders:  -     CBC & Differential  -     Amylase  -     Lipase  -     US Gallbladder; Future  -     NM Hepatobiliary Without CCK; Future  -     promethazine (PHENERGAN) 25 MG tablet; Take 1 tablet by mouth Every 6 (Six) Hours As Needed for Nausea or Vomiting.  Dispense: 30 tablet; Refill: 2    3. Right lower quadrant abdominal pain  Comments:  Patient is very tender in the right upper quadrant.  She has had some pain that is gone through to her back and right shoulder blade.  She has had nausea vomiting, fever of 100.1.  And on pelvic ultrasound today she is tender in the right lower quadrant with a hyperechoic area noted.  Will have CT to rule out appendicitis.  Orders:  -     CT Abdomen Pelvis With & Without Contrast; Future         Patient's Body mass index is 30.84 kg/m². BMI is above normal parameters. Recommendations include: educational material, exercise counseling and nutrition counseling.      Lynette Gallegso, APRN  12/28/2020

## 2020-12-28 NOTE — PATIENT INSTRUCTIONS
"BMI for Adults  What is BMI?  Body mass index (BMI) is a number that is calculated from a person's weight and height. BMI can help estimate how much of a person's weight is composed of fat. BMI does not measure body fat directly. Rather, it is an alternative to procedures that directly measure body fat, which can be difficult and expensive.  BMI can help identify people who may be at higher risk for certain medical problems.  What are BMI measurements used for?  BMI is used as a screening tool to identify possible weight problems. It helps determine whether a person is obese, overweight, a healthy weight, or underweight.  BMI is useful for:  · Identifying a weight problem that may be related to a medical condition or may increase the risk for medical problems.  · Promoting changes, such as changes in diet and exercise, to help reach a healthy weight. BMI screening can be repeated to see if these changes are working.  How is BMI calculated?  BMI involves measuring your weight in relation to your height. Both height and weight are measured, and the BMI is calculated from those numbers. This can be done either in English ([place] or metric measurements. Note that charts and online BMI calculators are available to help you find your BMI quickly and easily without having to do these calculations yourself.  To calculate your BMI in English ([place] measurements:    1. Measure your weight in pounds (lb).  2. Multiply the number of pounds by 703.  ? For example, for a person who weighs 180 lb, multiply that number by 703, which equals 126,540.  3. Measure your height in inches. Then multiply that number by itself to get a measurement called \"inches squared.\"  ? For example, for a person who is 70 inches tall, the \"inches squared\" measurement is 70 inches x 70 inches, which equals 4,900 inches squared.  4. Divide the total from step 2 (number of lb x 703) by the total from step 3 (inches squared): 126,540 ÷ 4,900 = 25.8. This is " "your BMI.  To calculate your BMI in metric measurements:  1. Measure your weight in kilograms (kg).  2. Measure your height in meters (m). Then multiply that number by itself to get a measurement called \"meters squared.\"  ? For example, for a person who is 1.75 m tall, the \"meters squared\" measurement is 1.75 m x 1.75 m, which is equal to 3.1 meters squared.  3. Divide the number of kilograms (your weight) by the meters squared number. In this example: 70 ÷ 3.1 = 22.6. This is your BMI.  What do the results mean?  BMI charts are used to identify whether you are underweight, normal weight, overweight, or obese. The following guidelines will be used:  · Underweight: BMI less than 18.5.  · Normal weight: BMI between 18.5 and 24.9.  · Overweight: BMI between 25 and 29.9.  · Obese: BMI of 30 or above.  Keep these notes in mind:  · Weight includes both fat and muscle, so someone with a muscular build, such as an athlete, may have a BMI that is higher than 24.9. In cases like these, BMI is not an accurate measure of body fat.  · To determine if excess body fat is the cause of a BMI of 25 or higher, further assessments may need to be done by a health care provider.  · BMI is usually interpreted in the same way for men and women.  Where to find more information  For more information about BMI, including tools to quickly calculate your BMI, go to these websites:  · Centers for Disease Control and Prevention: www.cdc.gov  · American Heart Association: www.heart.org  · National Heart, Lung, and [employer] [place]: www.nhlbi.nih.gov  Summary  · Body mass index (BMI) is a number that is calculated from a person's weight and height.  · BMI may help estimate how much of a person's weight is composed of fat. BMI can help identify those who may be at higher risk for certain medical problems.  · BMI can be measured using English measurements or metric measurements.  · BMI charts are used to identify whether you are underweight, normal " weight, overweight, or obese.  This information is not intended to replace advice given to you by your health care provider. Make sure you discuss any questions you have with your health care provider.  Document Revised: 09/09/2020 Document Reviewed: 07/17/2020  [employer]vier Patient Education © 2020 [employer]

## 2020-12-29 ENCOUNTER — HOSPITAL ENCOUNTER (OUTPATIENT)
Dept: ULTRASOUND IMAGING | Facility: HOSPITAL | Age: 50
Discharge: HOME | End: 2020-12-29
Admitting: NURSE PRACTITIONER

## 2020-12-29 ENCOUNTER — HOSPITAL ENCOUNTER (OUTPATIENT)
Dept: NUCLEAR MEDICINE | Facility: HOSPITAL | Age: 50
Discharge: HOME | End: 2020-12-29

## 2020-12-29 DIAGNOSIS — R11.2 INTRACTABLE VOMITING WITH NAUSEA, UNSPECIFIED VOMITING TYPE: ICD-10-CM

## 2020-12-29 LAB
AMYLASE SERPL-CCNC: 51 U/L (ref 28–100)
BASOPHILS # BLD AUTO: 0.07 10*3/MM3 (ref 0–0.2)
BASOPHILS NFR BLD AUTO: 0.5 % (ref 0–1.5)
EOSINOPHIL # BLD AUTO: 0.05 10*3/MM3 (ref 0–0.4)
EOSINOPHIL NFR BLD AUTO: 0.4 % (ref 0.3–6.2)
ERYTHROCYTE [DISTWIDTH] IN BLOOD BY AUTOMATED COUNT: 11.9 % (ref 12.3–15.4)
HCT VFR BLD AUTO: 37.7 % (ref 34–46.6)
HGB BLD-MCNC: 12.6 G/DL (ref 12–15.9)
IMM GRANULOCYTES # BLD AUTO: 0.07 10*3/MM3 (ref 0–0.05)
IMM GRANULOCYTES NFR BLD AUTO: 0.5 % (ref 0–0.5)
LIPASE SERPL-CCNC: 18 U/L (ref 13–60)
LYMPHOCYTES # BLD AUTO: 1.15 10*3/MM3 (ref 0.7–3.1)
LYMPHOCYTES NFR BLD AUTO: 8.9 % (ref 19.6–45.3)
MCH RBC QN AUTO: 30.3 PG (ref 26.6–33)
MCHC RBC AUTO-ENTMCNC: 33.4 G/DL (ref 31.5–35.7)
MCV RBC AUTO: 90.6 FL (ref 79–97)
MONOCYTES # BLD AUTO: 0.6 10*3/MM3 (ref 0.1–0.9)
MONOCYTES NFR BLD AUTO: 4.6 % (ref 5–12)
NEUTROPHILS # BLD AUTO: 11.05 10*3/MM3 (ref 1.7–7)
NEUTROPHILS NFR BLD AUTO: 85.1 % (ref 42.7–76)
NRBC BLD AUTO-RTO: 0 /100 WBC (ref 0–0.2)
PLATELET # BLD AUTO: 306 10*3/MM3 (ref 140–450)
RBC # BLD AUTO: 4.16 10*6/MM3 (ref 3.77–5.28)
WBC # BLD AUTO: 12.99 10*3/MM3 (ref 3.4–10.8)

## 2020-12-29 PROCEDURE — 76705 ECHO EXAM OF ABDOMEN: CPT

## 2020-12-29 PROCEDURE — 78226 HEPATOBILIARY SYSTEM IMAGING: CPT

## 2020-12-29 PROCEDURE — A9537 TC99M MEBROFENIN: HCPCS | Performed by: NURSE PRACTITIONER

## 2020-12-29 PROCEDURE — 0 TECHNETIUM TC 99M MEBROFENIN KIT: Performed by: NURSE PRACTITIONER

## 2020-12-29 RX ORDER — KIT FOR THE PREPARATION OF TECHNETIUM TC 99M MEBROFENIN 45 MG/10ML
1 INJECTION, POWDER, LYOPHILIZED, FOR SOLUTION INTRAVENOUS
Status: COMPLETED | OUTPATIENT
Start: 2020-12-29 | End: 2020-12-29

## 2020-12-29 RX ADMIN — MEBROFENIN 1 DOSE: 45 INJECTION, POWDER, LYOPHILIZED, FOR SOLUTION INTRAVENOUS at 13:49

## 2020-12-30 ENCOUNTER — HOSPITAL ENCOUNTER (OUTPATIENT)
Dept: CT IMAGING | Facility: HOSPITAL | Age: 50
Discharge: HOME | End: 2020-12-30
Admitting: NURSE PRACTITIONER

## 2020-12-30 DIAGNOSIS — D72.829 LEUKOCYTOSIS, UNSPECIFIED TYPE: Primary | ICD-10-CM

## 2020-12-30 DIAGNOSIS — R10.31 RIGHT LOWER QUADRANT ABDOMINAL PAIN: ICD-10-CM

## 2020-12-30 PROCEDURE — 25010000002 IOPAMIDOL 61 % SOLUTION: Performed by: NURSE PRACTITIONER

## 2020-12-30 PROCEDURE — 74177 CT ABD & PELVIS W/CONTRAST: CPT

## 2020-12-30 PROCEDURE — 82565 ASSAY OF CREATININE: CPT

## 2020-12-30 RX ADMIN — IOPAMIDOL 100 ML: 612 INJECTION, SOLUTION INTRAVENOUS at 09:16

## 2020-12-30 RX ADMIN — IOPAMIDOL 50 ML: 612 INJECTION, SOLUTION INTRAVENOUS at 09:16

## 2021-01-05 DIAGNOSIS — D72.829 LEUKOCYTOSIS, UNSPECIFIED TYPE: Primary | ICD-10-CM

## 2021-01-05 LAB
BASOPHILS # BLD AUTO: 0.09 10*3/MM3 (ref 0–0.2)
BASOPHILS NFR BLD AUTO: 0.8 % (ref 0–1.5)
EOSINOPHIL # BLD AUTO: 0.24 10*3/MM3 (ref 0–0.4)
EOSINOPHIL NFR BLD AUTO: 2.1 % (ref 0.3–6.2)
ERYTHROCYTE [DISTWIDTH] IN BLOOD BY AUTOMATED COUNT: 11.9 % (ref 12.3–15.4)
HCT VFR BLD AUTO: 37.1 % (ref 34–46.6)
HGB BLD-MCNC: 12.3 G/DL (ref 12–15.9)
IMM GRANULOCYTES # BLD AUTO: 0.08 10*3/MM3 (ref 0–0.05)
IMM GRANULOCYTES NFR BLD AUTO: 0.7 % (ref 0–0.5)
LYMPHOCYTES # BLD AUTO: 1.53 10*3/MM3 (ref 0.7–3.1)
LYMPHOCYTES NFR BLD AUTO: 13.2 % (ref 19.6–45.3)
MCH RBC QN AUTO: 29.4 PG (ref 26.6–33)
MCHC RBC AUTO-ENTMCNC: 33.2 G/DL (ref 31.5–35.7)
MCV RBC AUTO: 88.8 FL (ref 79–97)
MONOCYTES # BLD AUTO: 1.25 10*3/MM3 (ref 0.1–0.9)
MONOCYTES NFR BLD AUTO: 10.8 % (ref 5–12)
NEUTROPHILS # BLD AUTO: 8.41 10*3/MM3 (ref 1.7–7)
NEUTROPHILS NFR BLD AUTO: 72.4 % (ref 42.7–76)
NRBC BLD AUTO-RTO: 0 /100 WBC (ref 0–0.2)
PLATELET # BLD AUTO: 306 10*3/MM3 (ref 140–450)
RBC # BLD AUTO: 4.18 10*6/MM3 (ref 3.77–5.28)
WBC # BLD AUTO: 11.6 10*3/MM3 (ref 3.4–10.8)

## 2021-01-07 LAB — CREAT BLDA-MCNC: 3.9 MG/DL (ref 0.6–1.3)

## 2021-01-11 DIAGNOSIS — N28.9 KIDNEY FUNCTION ABNORMAL: Primary | ICD-10-CM

## 2021-01-11 DIAGNOSIS — R35.0 URINARY FREQUENCY: Primary | ICD-10-CM

## 2021-01-13 ENCOUNTER — APPOINTMENT (OUTPATIENT)
Dept: CT IMAGING | Facility: HOSPITAL | Age: 51
End: 2021-01-13

## 2021-01-13 ENCOUNTER — APPOINTMENT (OUTPATIENT)
Dept: ULTRASOUND IMAGING | Facility: HOSPITAL | Age: 51
End: 2021-01-13

## 2021-01-13 ENCOUNTER — HOSPITAL ENCOUNTER (INPATIENT)
Facility: HOSPITAL | Age: 51
LOS: 3 days | Discharge: HOME | End: 2021-01-16
Attending: FAMILY MEDICINE | Admitting: FAMILY MEDICINE

## 2021-01-13 PROBLEM — N17.9 AKI (ACUTE KIDNEY INJURY): Status: ACTIVE | Noted: 2021-01-13

## 2021-01-13 LAB
25(OH)D3 SERPL-MCNC: 49.4 NG/ML (ref 30–100)
ALBUMIN SERPL-MCNC: 4.2 G/DL (ref 3.5–5.2)
ALBUMIN SERPL-MCNC: 4.5 G/DL (ref 3.5–5.2)
ALBUMIN/GLOB SERPL: 1 G/DL
ALBUMIN/GLOB SERPL: 1.3 G/DL
ALP SERPL-CCNC: 165 U/L (ref 39–117)
ALP SERPL-CCNC: 177 U/L (ref 39–117)
ALT SERPL W P-5'-P-CCNC: 24 U/L (ref 1–33)
ALT SERPL-CCNC: 29 U/L (ref 1–33)
ANION GAP SERPL CALCULATED.3IONS-SCNC: 11 MMOL/L (ref 5–15)
ANION GAP SERPL CALCULATED.3IONS-SCNC: 11 MMOL/L (ref 5–15)
AST SERPL-CCNC: 18 U/L (ref 1–32)
AST SERPL-CCNC: 30 U/L (ref 1–32)
BACTERIA UR QL AUTO: ABNORMAL /HPF
BASOPHILS # BLD AUTO: 0.08 10*3/MM3 (ref 0–0.2)
BASOPHILS # BLD AUTO: 0.08 10*3/MM3 (ref 0–0.2)
BASOPHILS NFR BLD AUTO: 0.7 % (ref 0–1.5)
BASOPHILS NFR BLD AUTO: 0.8 % (ref 0–1.5)
BILIRUB SERPL-MCNC: 0.2 MG/DL (ref 0–1.2)
BILIRUB SERPL-MCNC: 0.2 MG/DL (ref 0–1.2)
BILIRUB UR QL STRIP: NEGATIVE
BUN SERPL-MCNC: 36 MG/DL (ref 6–20)
BUN SERPL-MCNC: 36 MG/DL (ref 6–20)
BUN SERPL-MCNC: 39 MG/DL (ref 6–20)
BUN/CREAT SERPL: 11.1 (ref 7–25)
BUN/CREAT SERPL: 9.3 (ref 7–25)
BUN/CREAT SERPL: 9.5 (ref 7–25)
CA-I BLD-MCNC: >7.21 MG/DL (ref 4.6–5.4)
CALCIUM SERPL-MCNC: 12.5 MG/DL (ref 8.6–10.5)
CALCIUM SPEC-SCNC: 14.9 MG/DL (ref 8.6–10.5)
CALCIUM SPEC-SCNC: 15.7 MG/DL (ref 8.6–10.5)
CHLORIDE SERPL-SCNC: 101 MMOL/L (ref 98–107)
CHLORIDE SERPL-SCNC: 98 MMOL/L (ref 98–107)
CHLORIDE SERPL-SCNC: 98 MMOL/L (ref 98–107)
CLARITY UR: CLEAR
CO2 SERPL-SCNC: 28 MMOL/L (ref 22–29)
CO2 SERPL-SCNC: 29 MMOL/L (ref 22–29)
CO2 SERPL-SCNC: 31 MMOL/L (ref 22–29)
COLOR UR: YELLOW
CREAT SERPL-MCNC: 3.51 MG/DL (ref 0.57–1)
CREAT SERPL-MCNC: 3.78 MG/DL (ref 0.57–1)
CREAT SERPL-MCNC: 3.88 MG/DL (ref 0.57–1)
DEPRECATED RDW RBC AUTO: 39.8 FL (ref 37–54)
EOSINOPHIL # BLD AUTO: 0.11 10*3/MM3 (ref 0–0.4)
EOSINOPHIL # BLD AUTO: 0.18 10*3/MM3 (ref 0–0.4)
EOSINOPHIL NFR BLD AUTO: 1 % (ref 0.3–6.2)
EOSINOPHIL NFR BLD AUTO: 1.7 % (ref 0.3–6.2)
ERYTHROCYTE [DISTWIDTH] IN BLOOD BY AUTOMATED COUNT: 12 % (ref 12.3–15.4)
ERYTHROCYTE [DISTWIDTH] IN BLOOD BY AUTOMATED COUNT: 12.4 % (ref 12.3–15.4)
FERRITIN SERPL-MCNC: 271.7 NG/ML (ref 13–150)
GFR SERPL CREATININE-BSD FRML MDRD: 12 ML/MIN/1.73
GFR SERPL CREATININE-BSD FRML MDRD: 13 ML/MIN/1.73
GFR SERPL CREATININE-BSD FRML MDRD: ABNORMAL ML/MIN/{1.73_M2}
GFR SERPL CREATININE-BSD FRML MDRD: ABNORMAL ML/MIN/{1.73_M2}
GLOBULIN SER CALC-MCNC: 3.3 GM/DL
GLOBULIN UR ELPH-MCNC: 4.5 GM/DL
GLUCOSE SERPL-MCNC: 77 MG/DL (ref 65–99)
GLUCOSE SERPL-MCNC: 86 MG/DL (ref 65–99)
GLUCOSE SERPL-MCNC: 94 MG/DL (ref 65–99)
GLUCOSE UR STRIP-MCNC: NEGATIVE MG/DL
HCT VFR BLD AUTO: 32.4 % (ref 34–46.6)
HCT VFR BLD AUTO: 35.1 % (ref 34–46.6)
HGB BLD-MCNC: 11.2 G/DL (ref 12–15.9)
HGB BLD-MCNC: 12.2 G/DL (ref 12–15.9)
HGB UR QL STRIP.AUTO: NEGATIVE
HYALINE CASTS UR QL AUTO: ABNORMAL /LPF
IMM GRANULOCYTES # BLD AUTO: 0.07 10*3/MM3 (ref 0–0.05)
IMM GRANULOCYTES # BLD AUTO: 0.1 10*3/MM3 (ref 0–0.05)
IMM GRANULOCYTES NFR BLD AUTO: 0.7 % (ref 0–0.5)
IMM GRANULOCYTES NFR BLD AUTO: 0.9 % (ref 0–0.5)
IRON 24H UR-MRATE: 59 MCG/DL (ref 37–145)
IRON SATN MFR SERPL: 19 % (ref 20–50)
KETONES UR QL STRIP: NEGATIVE
LEUKOCYTE ESTERASE UR QL STRIP.AUTO: ABNORMAL
LYMPHOCYTES # BLD AUTO: 1.31 10*3/MM3 (ref 0.7–3.1)
LYMPHOCYTES # BLD AUTO: 1.62 10*3/MM3 (ref 0.7–3.1)
LYMPHOCYTES NFR BLD AUTO: 12.4 % (ref 19.6–45.3)
LYMPHOCYTES NFR BLD AUTO: 14.4 % (ref 19.6–45.3)
Lab: ABNORMAL
Lab: ABNORMAL
MAGNESIUM SERPL-MCNC: 2.2 MG/DL (ref 1.6–2.6)
MCH RBC QN AUTO: 30.4 PG (ref 26.6–33)
MCH RBC QN AUTO: 30.7 PG (ref 26.6–33)
MCHC RBC AUTO-ENTMCNC: 34.6 G/DL (ref 31.5–35.7)
MCHC RBC AUTO-ENTMCNC: 34.8 G/DL (ref 31.5–35.7)
MCV RBC AUTO: 88 FL (ref 79–97)
MCV RBC AUTO: 88.4 FL (ref 79–97)
MONOCYTES # BLD AUTO: 0.86 10*3/MM3 (ref 0.1–0.9)
MONOCYTES # BLD AUTO: 1.02 10*3/MM3 (ref 0.1–0.9)
MONOCYTES NFR BLD AUTO: 8.1 % (ref 5–12)
MONOCYTES NFR BLD AUTO: 9.1 % (ref 5–12)
NEUTROPHILS # BLD AUTO: 8.06 10*3/MM3 (ref 1.7–7)
NEUTROPHILS NFR BLD AUTO: 73.9 % (ref 42.7–76)
NEUTROPHILS NFR BLD AUTO: 76.3 % (ref 42.7–76)
NEUTROPHILS NFR BLD AUTO: 8.29 10*3/MM3 (ref 1.7–7)
NITRITE UR QL STRIP: NEGATIVE
NOTIFIED BY: ABNORMAL
NOTIFIED WHO: ABNORMAL
NRBC BLD AUTO-RTO: 0 /100 WBC (ref 0–0.2)
NRBC BLD AUTO-RTO: 0 /100 WBC (ref 0–0.2)
OSMOLALITY UR: 243 MOSM/KG (ref 601–850)
PH UR STRIP.AUTO: 8 [PH] (ref 5–8)
PHOSPHATE SERPL-MCNC: 3.3 MG/DL (ref 2.5–4.5)
PLATELET # BLD AUTO: 317 10*3/MM3 (ref 140–450)
PLATELET # BLD AUTO: 341 10*3/MM3 (ref 140–450)
PMV BLD AUTO: 9.5 FL (ref 6–12)
POTASSIUM SERPL-SCNC: 3.8 MMOL/L (ref 3.5–5.2)
POTASSIUM SERPL-SCNC: 3.9 MMOL/L (ref 3.5–5.2)
POTASSIUM SERPL-SCNC: 4.1 MMOL/L (ref 3.5–5.2)
PROT SERPL-MCNC: 7.5 G/DL (ref 6–8.5)
PROT SERPL-MCNC: 9 G/DL (ref 6–8.5)
PROT UR QL STRIP: ABNORMAL
PROT UR-MCNC: 13.8 MG/DL
PTH-INTACT SERPL-MCNC: 1.4 PG/ML (ref 15–65)
RBC # BLD AUTO: 3.68 10*6/MM3 (ref 3.77–5.28)
RBC # BLD AUTO: 3.97 10*6/MM3 (ref 3.77–5.28)
RBC # UR: ABNORMAL /HPF
REF LAB TEST METHOD: ABNORMAL
SARS-COV-2 RDRP RESP QL NAA+PROBE: NORMAL
SODIUM SERPL-SCNC: 138 MMOL/L (ref 136–145)
SODIUM SERPL-SCNC: 139 MMOL/L (ref 136–145)
SODIUM SERPL-SCNC: 140 MMOL/L (ref 136–145)
SODIUM UR-SCNC: 71 MMOL/L
SP GR UR STRIP: 1.01 (ref 1–1.03)
SQUAMOUS #/AREA URNS HPF: ABNORMAL /HPF
TIBC SERPL-MCNC: 317 MCG/DL (ref 298–536)
TRANSFERRIN SERPL-MCNC: 213 MG/DL (ref 200–360)
UROBILINOGEN UR QL STRIP: ABNORMAL
VIT B12 BLD-MCNC: 463 PG/ML (ref 211–946)
WBC # BLD AUTO: 10.56 10*3/MM3 (ref 3.4–10.8)
WBC # BLD AUTO: 11.22 10*3/MM3 (ref 3.4–10.8)
WBC UR QL AUTO: ABNORMAL /HPF

## 2021-01-13 PROCEDURE — 25010000002 ONDANSETRON PER 1 MG: Performed by: NURSE PRACTITIONER

## 2021-01-13 PROCEDURE — 82570 ASSAY OF URINE CREATININE: CPT | Performed by: NURSE PRACTITIONER

## 2021-01-13 PROCEDURE — 83735 ASSAY OF MAGNESIUM: CPT | Performed by: NURSE PRACTITIONER

## 2021-01-13 PROCEDURE — 82164 ANGIOTENSIN I ENZYME TEST: CPT | Performed by: INTERNAL MEDICINE

## 2021-01-13 PROCEDURE — 84165 PROTEIN E-PHORESIS SERUM: CPT | Performed by: INTERNAL MEDICINE

## 2021-01-13 PROCEDURE — 99254 IP/OBS CNSLTJ NEW/EST MOD 60: CPT | Performed by: INTERNAL MEDICINE

## 2021-01-13 PROCEDURE — 25010000002 ZOLEDRONIC ACID 4 MG/100ML SOLUTION: Performed by: INTERNAL MEDICINE

## 2021-01-13 PROCEDURE — 84156 ASSAY OF PROTEIN URINE: CPT | Performed by: NURSE PRACTITIONER

## 2021-01-13 PROCEDURE — 82746 ASSAY OF FOLIC ACID SERUM: CPT | Performed by: INTERNAL MEDICINE

## 2021-01-13 PROCEDURE — 94799 UNLISTED PULMONARY SVC/PX: CPT

## 2021-01-13 PROCEDURE — 63710000001 CALCITONIN PER 400 UNITS: Performed by: INTERNAL MEDICINE

## 2021-01-13 PROCEDURE — 76775 US EXAM ABDO BACK WALL LIM: CPT

## 2021-01-13 PROCEDURE — 25010000002 FUROSEMIDE PER 20 MG: Performed by: INTERNAL MEDICINE

## 2021-01-13 PROCEDURE — 80048 BASIC METABOLIC PNL TOTAL CA: CPT | Performed by: INTERNAL MEDICINE

## 2021-01-13 PROCEDURE — 81001 URINALYSIS AUTO W/SCOPE: CPT | Performed by: NURSE PRACTITIONER

## 2021-01-13 PROCEDURE — 84466 ASSAY OF TRANSFERRIN: CPT | Performed by: NURSE PRACTITIONER

## 2021-01-13 PROCEDURE — 82397 CHEMILUMINESCENT ASSAY: CPT | Performed by: INTERNAL MEDICINE

## 2021-01-13 PROCEDURE — 83970 ASSAY OF PARATHORMONE: CPT | Performed by: NURSE PRACTITIONER

## 2021-01-13 PROCEDURE — 87205 SMEAR GRAM STAIN: CPT | Performed by: INTERNAL MEDICINE

## 2021-01-13 PROCEDURE — 84100 ASSAY OF PHOSPHORUS: CPT | Performed by: NURSE PRACTITIONER

## 2021-01-13 PROCEDURE — 80053 COMPREHEN METABOLIC PANEL: CPT | Performed by: NURSE PRACTITIONER

## 2021-01-13 PROCEDURE — 71250 CT THORAX DX C-: CPT

## 2021-01-13 PROCEDURE — 85025 COMPLETE CBC W/AUTO DIFF WBC: CPT | Performed by: NURSE PRACTITIONER

## 2021-01-13 PROCEDURE — 82330 ASSAY OF CALCIUM: CPT

## 2021-01-13 PROCEDURE — 84300 ASSAY OF URINE SODIUM: CPT | Performed by: NURSE PRACTITIONER

## 2021-01-13 PROCEDURE — 82607 VITAMIN B-12: CPT | Performed by: NURSE PRACTITIONER

## 2021-01-13 PROCEDURE — 82306 VITAMIN D 25 HYDROXY: CPT | Performed by: INTERNAL MEDICINE

## 2021-01-13 PROCEDURE — 83540 ASSAY OF IRON: CPT | Performed by: NURSE PRACTITIONER

## 2021-01-13 PROCEDURE — 83935 ASSAY OF URINE OSMOLALITY: CPT | Performed by: INTERNAL MEDICINE

## 2021-01-13 PROCEDURE — 87635 SARS-COV-2 COVID-19 AMP PRB: CPT | Performed by: FAMILY MEDICINE

## 2021-01-13 PROCEDURE — 82728 ASSAY OF FERRITIN: CPT | Performed by: NURSE PRACTITIONER

## 2021-01-13 RX ORDER — CALCITONIN SALMON 200 [USP'U]/ML
100 INJECTION, SOLUTION INTRAMUSCULAR; SUBCUTANEOUS EVERY 12 HOURS
Status: COMPLETED | OUTPATIENT
Start: 2021-01-13 | End: 2021-01-14

## 2021-01-13 RX ORDER — ZOLEDRONIC ACID 0.04 MG/ML
4 INJECTION, SOLUTION INTRAVENOUS ONCE
Status: COMPLETED | OUTPATIENT
Start: 2021-01-13 | End: 2021-01-13

## 2021-01-13 RX ORDER — FUROSEMIDE 10 MG/ML
40 INJECTION INTRAMUSCULAR; INTRAVENOUS EVERY 12 HOURS
Status: DISCONTINUED | OUTPATIENT
Start: 2021-01-13 | End: 2021-01-15

## 2021-01-13 RX ORDER — ONDANSETRON 2 MG/ML
4 INJECTION INTRAMUSCULAR; INTRAVENOUS EVERY 6 HOURS PRN
Status: DISCONTINUED | OUTPATIENT
Start: 2021-01-13 | End: 2021-01-16 | Stop reason: HOSPADM

## 2021-01-13 RX ORDER — ACETAMINOPHEN 160 MG/5ML
650 SOLUTION ORAL EVERY 4 HOURS PRN
Status: DISCONTINUED | OUTPATIENT
Start: 2021-01-13 | End: 2021-01-16 | Stop reason: HOSPADM

## 2021-01-13 RX ORDER — ACETAMINOPHEN 325 MG/1
650 TABLET ORAL EVERY 4 HOURS PRN
Status: DISCONTINUED | OUTPATIENT
Start: 2021-01-13 | End: 2021-01-16 | Stop reason: HOSPADM

## 2021-01-13 RX ORDER — ACETAMINOPHEN 650 MG/1
650 SUPPOSITORY RECTAL EVERY 4 HOURS PRN
Status: DISCONTINUED | OUTPATIENT
Start: 2021-01-13 | End: 2021-01-16 | Stop reason: HOSPADM

## 2021-01-13 RX ORDER — SODIUM CHLORIDE 0.9 % (FLUSH) 0.9 %
10 SYRINGE (ML) INJECTION EVERY 12 HOURS SCHEDULED
Status: DISCONTINUED | OUTPATIENT
Start: 2021-01-13 | End: 2021-01-16 | Stop reason: HOSPADM

## 2021-01-13 RX ORDER — ONDANSETRON 4 MG/1
4 TABLET, FILM COATED ORAL EVERY 6 HOURS PRN
Status: DISCONTINUED | OUTPATIENT
Start: 2021-01-13 | End: 2021-01-16 | Stop reason: HOSPADM

## 2021-01-13 RX ORDER — SODIUM CHLORIDE 9 MG/ML
75 INJECTION, SOLUTION INTRAVENOUS CONTINUOUS
Status: DISCONTINUED | OUTPATIENT
Start: 2021-01-13 | End: 2021-01-16 | Stop reason: HOSPADM

## 2021-01-13 RX ORDER — CLOPIDOGREL BISULFATE 75 MG/1
75 TABLET ORAL DAILY
Status: DISCONTINUED | OUTPATIENT
Start: 2021-01-14 | End: 2021-01-16 | Stop reason: HOSPADM

## 2021-01-13 RX ORDER — HYDROCODONE BITARTRATE AND ACETAMINOPHEN 5; 325 MG/1; MG/1
1 TABLET ORAL EVERY 4 HOURS PRN
Status: DISCONTINUED | OUTPATIENT
Start: 2021-01-13 | End: 2021-01-16 | Stop reason: HOSPADM

## 2021-01-13 RX ORDER — AMOXICILLIN 250 MG
2 CAPSULE ORAL 2 TIMES DAILY
Status: DISCONTINUED | OUTPATIENT
Start: 2021-01-13 | End: 2021-01-16 | Stop reason: HOSPADM

## 2021-01-13 RX ORDER — SODIUM BICARBONATE 650 MG/1
650 TABLET ORAL 2 TIMES DAILY
Status: DISCONTINUED | OUTPATIENT
Start: 2021-01-13 | End: 2021-01-13

## 2021-01-13 RX ORDER — SODIUM CHLORIDE 0.9 % (FLUSH) 0.9 %
10 SYRINGE (ML) INJECTION AS NEEDED
Status: DISCONTINUED | OUTPATIENT
Start: 2021-01-13 | End: 2021-01-16 | Stop reason: HOSPADM

## 2021-01-13 RX ORDER — PREDNISONE 1 MG/1
5 TABLET ORAL
Status: DISCONTINUED | OUTPATIENT
Start: 2021-01-14 | End: 2021-01-16 | Stop reason: HOSPADM

## 2021-01-13 RX ORDER — ASPIRIN 81 MG/1
81 TABLET ORAL DAILY
Status: DISCONTINUED | OUTPATIENT
Start: 2021-01-14 | End: 2021-01-16 | Stop reason: HOSPADM

## 2021-01-13 RX ADMIN — ONDANSETRON HYDROCHLORIDE 4 MG: 2 SOLUTION INTRAMUSCULAR; INTRAVENOUS at 18:37

## 2021-01-13 RX ADMIN — ZOLEDRONIC ACID 4 MG: 0.04 INJECTION, SOLUTION INTRAVENOUS at 16:46

## 2021-01-13 RX ADMIN — FUROSEMIDE 40 MG: 10 INJECTION, SOLUTION INTRAVENOUS at 18:00

## 2021-01-13 RX ADMIN — SODIUM CHLORIDE 200 ML/HR: 9 INJECTION, SOLUTION INTRAVENOUS at 21:05

## 2021-01-13 RX ADMIN — SODIUM BICARBONATE 650 MG: 650 TABLET ORAL at 15:13

## 2021-01-13 RX ADMIN — SODIUM CHLORIDE 125 ML/HR: 9 INJECTION, SOLUTION INTRAVENOUS at 14:51

## 2021-01-13 RX ADMIN — SODIUM CHLORIDE, PRESERVATIVE FREE 10 ML: 5 INJECTION INTRAVENOUS at 14:52

## 2021-01-13 RX ADMIN — DOCUSATE SODIUM 50 MG AND SENNOSIDES 8.6 MG 2 TABLET: 8.6; 5 TABLET, FILM COATED ORAL at 20:20

## 2021-01-13 RX ADMIN — CALCITONIN SALMON 100 UNITS: 200 INJECTION, SOLUTION INTRAMUSCULAR; SUBCUTANEOUS at 18:00

## 2021-01-13 NOTE — CONSULTS
[facility] Oncology/Hematology Services  CONSULT NOTE    PATIENT NAME:  Ines Rowland  YOB: 1970  PATIENT MRN:  [id number]    Date of Admission:  1/13/2021  Consultation Date:  1/13/2021  Referring Provider: Chas Navarro MD    Subjective     Reason for Consultation: Retrocrural lymphadenopathy.    History of present illness: Ines Rowland is a pleasant 50 y.o.  female who is seen in consult for hypercalcemia and lymphadenopathy.  She is seen with spouse Marcello and nurse Joanne at the bedside.  She is a reliable historian.    She presented with right lower quadrant abdominal pain with nausea and vomiting since Christmas eve.  She had seen ORACIO Awan.    Transvaginal ultrasound 12/20/2020. The uterus is absent.  The left ovary is not visualized.  The right ovary appears normal and is without cysts or masses.    Gallbladder ultrasound 12/29/2020.  Unremarkable ultrasound the gallbladder, liver and biliary system.  Several shadowing cortical calcifications within the right kidney.    Hepatobiliary scan 12/29/2020.The gallbladder ejection fraction equals 30%, which is in the normal  Range.    CT abdomen and pelvis 12/30/2020. Right lower lobe groundglass opacities with tortuosity of the right  lower lobe vasculature, incompletely visualized and characterized on this exam. Consider CT chest for further evaluation.  Enlarged left cardiac and right retrocrural lymph nodes, which could  be further evaluated on the above recommended CT chest. Calcifications in the left greater than right kidney suggesting nephrocalcinosis.  CT scan was reviewed with radiology and adenopathies measured at 7 mm.  Not accessible for needle biopsy.      CBC on admission remarkable for WBC 11.2 with ANC of 8.2.  CMP remarkable for BUN 36, creatinine 3.7, calcium 15.7, protein 9, alkaline phosphatase 177 and GFR 13 mL/min.  Iron saturation 19% and ferritin 271.7.  B12 pending.   Intact PTH 1.4.  PTH RP pending.  Ionized calcium greater than 7.2.    Urinalysis on admission trace protein and trace leukocyte esterase.  Urine sodium 71, total protein 13.8, and osmolarity 243.  RBC 0-2, WBC 3-5 and squamous epithelial cells 3-6.    Renal ultrasound 01/13/2021 showed no hydronephrosis.  Redemonstration of bilateral renal parenchymal calcifications suggestive of nephro calcinosis.    Previous CT of the chest 08/10/2020 [place].  Overall stable exam compared to 12/23/2019 with scattered calcified granulomas and extensive calcified mediastinal fibrosis that results in chronic narrowing of the central airways and pulmonary vasculature.  Extensive arterial collaterals from the right upper lobe supplying the right mid and lower lobes and from the left lower lobe supplying the left upper lobe.  Extensive right venous collaterals are also noted.  Interseptal lobar thickening and groundglass opacities in the right mid and lower and left upper lobes likely represent sarcoidosis related interstitial lung disease.    With above background, she is seen.    Past Medical History:  Past Medical History:   Diagnosis Date   • Abnormal Pap smear of cervix    • Cervical dysplasia    • Disease of thyroid gland    • Thyroid cancer (CMS/HCC)      Prior Surgeries:  Past Surgical History:   Procedure Laterality Date   • HYSTERECTOMY      without BSO   • KNEE ARTHROPLASTY     • LUNG SURGERY Right 12/02/2020    Stent placed in right lung   • TOTAL THYROIDECTOMY     • WISDOM TOOTH EXTRACTION          Allergies:Patient has no known allergies.  PTA Meds:  Medications Prior to Admission   Medication Sig Dispense Refill Last Dose   • albuterol sulfate  (90 Base) MCG/ACT inhaler Inhale 2 puffs Every 4 (Four) Hours As Needed.   Past Month at Unknown time   • ALPRAZolam (XANAX) 0.25 MG tablet   1 Past Month at Unknown time   • Aspirin Low Dose 81 MG EC tablet    1/12/2021 at Unknown time   • buPROPion XL (Wellbutrin XL)  150 MG 24 hr tablet Take 1 tablet by mouth Daily. 30 tablet 12 1/13/2021 at Unknown time   • calcitriol (ROCALTROL) 0.25 MCG capsule Take 0.25 mcg by mouth 2 (Two) Times a Day.   1/12/2021 at Unknown time   • calcium carbonate (OS-TIMOTEO) 600 MG tablet Take 600 mg by mouth 2 (Two) Times a Day.   1/13/2021 at Unknown time   • clopidogrel (PLAVIX) 75 MG tablet    1/12/2021 at Unknown time   • cyclobenzaprine (FLEXERIL) 10 MG tablet    Past Week at Unknown time   • levothyroxine (SYNTHROID, LEVOTHROID) 150 MCG tablet Take 150 mcg by mouth Daily.   1/13/2021 at Unknown time   • predniSONE (DELTASONE) 20 MG tablet 5 mg.  0 1/13/2021 at Unknown time   • promethazine (PHENERGAN) 25 MG tablet Take 1 tablet by mouth Every 6 (Six) Hours As Needed for Nausea or Vomiting. 30 tablet 2 Past Month at Unknown time   • fluticasone-salmeterol (ADVAIR) 250-50 MCG/DOSE DISKUS Inhale 1 puff.         Current Meds:   Current Facility-Administered Medications   Medication Dose Route Frequency Provider Last Rate Last Admin   • acetaminophen (TYLENOL) tablet 650 mg  650 mg Oral Q4H PRN Moises Peterson APRN        Or   • acetaminophen (TYLENOL) 160 MG/5ML solution 650 mg  650 mg Oral Q4H PRN Moises Peterson APRN        Or   • acetaminophen (TYLENOL) suppository 650 mg  650 mg Rectal Q4H PRN Moises Peterson APRN       • [START ON 1/14/2021] aspirin EC tablet 81 mg  81 mg Oral Daily Moises Peterson APRN       • calcitonin (MIACALCIN) injection 100 Units  100 Units Subcutaneous Q12H Alfredo Loomis MD       • [START ON 1/14/2021] clopidogrel (PLAVIX) tablet 75 mg  75 mg Oral Daily Moises Peterson APRN       • furosemide (LASIX) injection 40 mg  40 mg Intravenous Q12H Alfredo Loomis MD       • HYDROcodone-acetaminophen (NORCO) 5-325 MG per tablet 1 tablet  1 tablet Oral Q4H PRN Moises Peterson APRN       • [START ON 1/14/2021] levothyroxine (SYNTHROID, LEVOTHROID) tablet 137 mcg  137 mcg Oral Q AM Moises Peterson  ORACIO Carter       • ondansetron (ZOFRAN) tablet 4 mg  4 mg Oral Q6H PRN Moises Peterson APRN        Or   • ondansetron (ZOFRAN) injection 4 mg  4 mg Intravenous Q6H PRN Moises Peterson APRN       • [START ON 1/14/2021] predniSONE (DELTASONE) tablet 5 mg  5 mg Oral Daily With Breakfast Moises Peterson APRN       • sennosides-docusate (PERICOLACE) 8.6-50 MG per tablet 2 tablet  2 tablet Oral BID Moises Peterson APRN       • sodium chloride 0.9 % flush 10 mL  10 mL Intravenous Q12H Moises Peterson APRN   10 mL at 01/13/21 1452   • sodium chloride 0.9 % flush 10 mL  10 mL Intravenous PRN Moises Peterson APRN       • sodium chloride 0.9 % infusion  200 mL/hr Intravenous Continuous Alfredo Loomis  mL/hr at 01/13/21 1451 125 mL/hr at 01/13/21 1451   • zoledronic acid (ZOMETA) infusion 4 mg/100 mL (premix)  4 mg Intravenous Once Alfredo Loomis MD           Family History:family history includes Arthritis in her brother and mother; Colon cancer (age of onset: 50) in her paternal uncle; No Known Problems in her daughter, maternal aunt, maternal grandmother, paternal aunt, paternal grandmother, sister, and son; Prostate cancer (age of onset: 72) in her father; Thyroid cancer in an other family member.   Social History: reports that she has never smoked. She has never used smokeless tobacco. She reports current alcohol use. She reports that she does not use drugs.  She is  with 2 boys.    Review of Systems  Review of Systems   Constitutional: Negative for chills, fatigue, fever and unexpected weight change.   HENT: Negative for congestion and trouble swallowing.    Eyes: Negative for redness and visual disturbance.   Respiratory: Negative for cough and shortness of breath.    Cardiovascular: Negative for chest pain and leg swelling.   Gastrointestinal: Negative for abdominal distention, nausea and vomiting.        She reports suprapubic discomfort.   Endocrine: Negative for  cold intolerance and heat intolerance.   Genitourinary: Negative for dysuria and flank pain.   Musculoskeletal: Negative for gait problem and neck stiffness.   Skin: Negative for pallor.   Neurological: Negative for dizziness, speech difficulty and weakness.   Hematological: Negative for adenopathy. Does not bruise/bleed easily.   Psychiatric/Behavioral: Negative for agitation, confusion and hallucinations.         Objective      Vital Signs   Temp:  [97.4 °F (36.3 °C)-98.4 °F (36.9 °C)] 98.4 °F (36.9 °C)  Heart Rate:  [74-78] 74  Resp:  [16] 16  BP: (136-155)/(78-80) 155/80    Physical Exam  Vitals signs reviewed.   Constitutional:       General: She is not in acute distress.  HENT:      Head: Normocephalic and atraumatic.   Eyes:      General: No scleral icterus.  Neck:      Musculoskeletal: Neck supple.   Cardiovascular:      Rate and Rhythm: Normal rate and regular rhythm.   Pulmonary:      Effort: No respiratory distress.      Breath sounds: No wheezing or rales.   Abdominal:      General: Bowel sounds are normal. There is no distension.      Palpations: Abdomen is soft.      Tenderness: There is no abdominal tenderness.   Musculoskeletal:         General: No swelling.   Skin:     General: Skin is warm and dry.      Coloration: Skin is not pale.   Neurological:      Mental Status: She is alert and oriented to person, place, and time.   Psychiatric:         Mood and Affect: Mood normal.         Thought Content: Thought content normal.         Judgment: Judgment normal.         Results from last 7 days   Lab Units 01/13/21  1214 01/12/21  0701   WBC 10*3/mm3 11.22* 10.56   HEMOGLOBIN g/dL 12.2 11.2*   HEMATOCRIT % 35.1 32.4*   PLATELETS 10*3/mm3 341 317        Results from last 7 days   Lab Units 01/13/21  1214 01/12/21  0701   SODIUM mmol/L 138 139   POTASSIUM mmol/L 3.8 4.1   CHLORIDE mmol/L 98 98   TOTAL CO2 mmol/L  --  31.0*   CO2 mmol/L 29.0  --    BUN mg/dL 36* 39*   CREATININE mg/dL 3.78* 3.51*   CALCIUM  mg/dL 15.7* 12.5*   BILIRUBIN mg/dL 0.2 0.2   ALK PHOS U/L 177* 165*   ALT (SGPT) U/L 24 29   AST (SGOT) U/L 18 30   GLUCOSE mg/dL 94  --        ABG:  No results found for: PHART, PO2ART, FBZ4QJU    Culture Data:   No results found for: BLOODCX, URINECX, WOUNDCX, MRSACX, RESPCX, STOOLCX    Radiology:   Imaging Results (Last 7 Days)     Procedure Component Value Units Date/Time    US Renal Bilateral [[id number]] Collected: 01/13/21 1405     Updated: 01/13/21 1414    Narrative:      EXAM: US RENAL BILATERAL- - 1/13/2021 1:13 PM CST     HISTORY: acute kidney failure       COMPARISON: 12/30/2020.      TECHNIQUE: Real time grayscale and color ultrasound performed with  representative images saved to PACS.      FINDINGS:  AORTA. Normal caliber by ultrasound.      RIGHT KIDNEY. Measures 10.7 x 5.0 x 5.9 cm. Normal renal contour. There  are chunky shadowing echogenic foci involving the renal parenchyma. No  hydronephrosis.     LEFT KIDNEY. Measures 10.6 x 6.3 x 4.8 cm. Normal renal contour. There  are coarse chunky calcification. No hydronephrosis.     BLADDER. Under distended urinary bladder appears grossly normal.          Impression:      1. No hydronephrosis.  2. Redemonstration of bilateral renal parenchymal calcifications,  suggestive of nephrocalcinosis, which has a broad differential.  This report was finalized on 01/13/2021 14:11 by Dr Mounika Goodson MD.                 Assessment/Plan        ASSESSMENT:   1.  Lymphadenopathy, left cardiac and right retrocrural, less than 1 cm, not accessible for needle biopsies as reviewed with radiology 01/13/2021.  2.  Hypercalcemia, etiology undefined.   3.  Sarcoidosis.  4.  Leukocytosis likely reactive process.  5.  Acute kidney injury.  6.  History of T1b, N0, follicular thyroid cancer post right hemithyroidectomy by Dr. Luu on 02/16/2016.      PLAN:  1.   Re: The reason for consult.  2.  Calcitonin 100 units subcutaneous every 12 hours and Zometa with hydration  ordered by nephrology.  3.  Schedule noncontrast CT of the chest to evaluate for lymphadenopathy.  4.  Plan for biopsy of the most accessible site for tissue diagnosis.  5.  Await serum protein electrophoresis and PTH related protein.   5.  Further recommendations pending.      I discussed the patients findings and my recommendations with the patient, spouse and and nurse.  They verbalized understanding.    Luis M Kim MD  01/13/21  16:02 CST    I spent 51 total minutes, face-to-face, caring for Ines rosario.  Greater than 50% of this time involved counseling and/or coordination of care as documented within this note regarding the patient's illness(es), pros and cons of various treatment options, instructions and/or risk reduction.        Thank you for allowing me to participate in the care of Ms. Ines Rowland

## 2021-01-13 NOTE — CONSULTS
Nephrology ([facility]) Consult Note      Patient:  Ines Rowland  YOB: 1970  Date of Service: 1/13/2021  MRN: [id number]   Acct: [id number]   Primary Care Physician: Chas Navarro MD  Advance Directive:   Code Status and Medical Interventions:   Ordered at: 01/13/21 1201     Level Of Support Discussed With:    Patient     Code Status:    CPR     Medical Interventions (Level of Support Prior to Arrest):    Full     Admit Date: 1/13/2021       Hospital Day: 0  Referring Provider: Chas Navarro MD      Patient Seen, Chart, Consults, Notes, Labs, Radiology studies reviewed.      Subjective:  Ines Rowland is a 50 y.o. female  whom we were consulted for acute kidney injury.  Patient has a history of thyroid cancer. She is s/p thyroidectomy and parathyroidectomy. She has been taking calcium replacement since. 3 weeks ago she has been having some GI symptoms mostly nausea and vomiting.  On December 30, she had a CT scan on emergent basis to investigate some abdominal discomfort.  Labs on that day showed a serum creatinine was 3.9.  Prior to that she had a serum creatinine of 1.3 on 10/27/20.  Patient denies any change to his urinary habits.  She also denied any regular use of NSAIDs.  Her nausea and vomiting have improved patient continues to have some suprapubic discomfort.  Her CT scan from December 30, 2020 showed the right lower lobe groundglass opacities with left cardiac and right retrocrural lymph nodes along with the right kidney nephrocalcinosis.  Patient assured me that these findings were chronic.  Her repeat labs continue to show abnormal serum creatinine.  Her creatinine was 3.5 on January 12 along with a serum calcium at 12.5.  Her repeat labs that showed the creatinine is 3.7 and his serum calcium was 15.7.  Her intact PTH was suppressed and her serum phosphorus was normal at 3.3.  Currently she has no dysuria and she continues to have  considerable urine output.    Allergies:  Patient has no known allergies.    Home Meds:  Medications Prior to Admission   Medication Sig Dispense Refill Last Dose   • albuterol sulfate  (90 Base) MCG/ACT inhaler Inhale 2 puffs Every 4 (Four) Hours As Needed.   Past Month at Unknown time   • ALPRAZolam (XANAX) 0.25 MG tablet   1 Past Month at Unknown time   • Aspirin Low Dose 81 MG EC tablet    1/12/2021 at Unknown time   • buPROPion XL (Wellbutrin XL) 150 MG 24 hr tablet Take 1 tablet by mouth Daily. 30 tablet 12 1/13/2021 at Unknown time   • calcitriol (ROCALTROL) 0.25 MCG capsule Take 0.25 mcg by mouth 2 (Two) Times a Day.   1/12/2021 at Unknown time   • calcium carbonate (OS-TIMOTEO) 600 MG tablet Take 600 mg by mouth 2 (Two) Times a Day.   1/13/2021 at Unknown time   • clopidogrel (PLAVIX) 75 MG tablet    1/12/2021 at Unknown time   • cyclobenzaprine (FLEXERIL) 10 MG tablet    Past Week at Unknown time   • levothyroxine (SYNTHROID, LEVOTHROID) 150 MCG tablet Take 150 mcg by mouth Daily.   1/13/2021 at Unknown time   • predniSONE (DELTASONE) 20 MG tablet 5 mg.  0 1/13/2021 at Unknown time   • promethazine (PHENERGAN) 25 MG tablet Take 1 tablet by mouth Every 6 (Six) Hours As Needed for Nausea or Vomiting. 30 tablet 2 Past Month at Unknown time   • fluticasone-salmeterol (ADVAIR) 250-50 MCG/DOSE DISKUS Inhale 1 puff.          Medicines:  Current Facility-Administered Medications   Medication Dose Route Frequency Provider Last Rate Last Admin   • acetaminophen (TYLENOL) tablet 650 mg  650 mg Oral Q4H PRN Moises Peterson APRN        Or   • acetaminophen (TYLENOL) 160 MG/5ML solution 650 mg  650 mg Oral Q4H PRN Moises Peterson APRN        Or   • acetaminophen (TYLENOL) suppository 650 mg  650 mg Rectal Q4H PRN Moises Peterson APRN       • [START ON 1/14/2021] aspirin EC tablet 81 mg  81 mg Oral Daily Moises Peterson APRN       • [START ON 1/14/2021] clopidogrel (PLAVIX) tablet 75 mg  75 mg Oral Daily  Moises Peterson APRN       • HYDROcodone-acetaminophen (NORCO) 5-325 MG per tablet 1 tablet  1 tablet Oral Q4H PRN Moises Peterson APRN       • [START ON 1/14/2021] levothyroxine (SYNTHROID, LEVOTHROID) tablet 137 mcg  137 mcg Oral Q AM Moises Peterson APRN       • ondansetron (ZOFRAN) tablet 4 mg  4 mg Oral Q6H PRN Moises Peterson APRN        Or   • ondansetron (ZOFRAN) injection 4 mg  4 mg Intravenous Q6H PRN Moises Peterson APRN       • [START ON 1/14/2021] predniSONE (DELTASONE) tablet 5 mg  5 mg Oral Daily With Breakfast Moises Peterson APRN       • sennosides-docusate (PERICOLACE) 8.6-50 MG per tablet 2 tablet  2 tablet Oral BID Moises Peterson APRN       • sodium bicarbonate tablet 650 mg  650 mg Oral BID Moises Peterson APRN   650 mg at 01/13/21 1513   • sodium chloride 0.9 % flush 10 mL  10 mL Intravenous Q12H Moises Petersno APRN   10 mL at 01/13/21 1452   • sodium chloride 0.9 % flush 10 mL  10 mL Intravenous PRN Moises Peterson APRN       • sodium chloride 0.9 % infusion  125 mL/hr Intravenous Continuous Moises Peterson APRN 125 mL/hr at 01/13/21 1451 125 mL/hr at 01/13/21 1451       Past Medical History:  Past Medical History:   Diagnosis Date   • Abnormal Pap smear of cervix    • Cervical dysplasia    • Disease of thyroid gland    • Thyroid cancer (CMS/HCC)        Past Surgical History:  Past Surgical History:   Procedure Laterality Date   • HYSTERECTOMY      without BSO   • KNEE ARTHROPLASTY     • LUNG SURGERY Right 12/02/2020    Stent placed in right lung   • TOTAL THYROIDECTOMY     • WISDOM TOOTH EXTRACTION         Family History  Family History   Problem Relation Age of Onset   • Prostate cancer Father 72   • Arthritis Mother    • Arthritis Brother    • Colon cancer Paternal Uncle 50   • Thyroid cancer Other    • No Known Problems Sister    • No Known Problems Daughter    • No Known Problems Son    • No Known Problems Maternal Grandmother    • No Known Problems  Paternal Grandmother    • No Known Problems Maternal Aunt    • No Known Problems Paternal Aunt    • Breast cancer Neg Hx    • Ovarian cancer Neg Hx    • Uterine cancer Neg Hx    • Melanoma Neg Hx    • BRCA 1/2 Neg Hx    • Endometrial cancer Neg Hx        Social History  Social History     Socioeconomic History   • Marital status:      Spouse name: Not on file   • Number of children: Not on file   • Years of education: Not on file   • Highest education level: Not on file   Tobacco Use   • Smoking status: Never Smoker   • Smokeless tobacco: Never Used   Substance and Sexual Activity   • Alcohol use: Yes     Comment: occ   • Drug use: No   • Sexual activity: Yes     Birth control/protection: Post-menopausal         Review of Systems:  History obtained from chart review and the patient  General ROS: No fever or chills  Respiratory ROS: No cough, shortness of breath, wheezing  Cardiovascular ROS: no chest pain or dyspnea on exertion  Gastrointestinal ROS: + abdominal pain or melena  Genito-Urinary ROS: No dysuria or hematuria  14 point ROS reviewed with the patient and negative except as noted above and in the HPI unless unable to obtain.    Objective:  /78 (BP Location: Left arm, Patient Position: Sitting)   Pulse 78   Temp 97.4 °F (36.3 °C) (Oral)   Resp 16   LMP 10/12/2005 (Approximate) Comment: Pelvic pain  SpO2 98%     Intake/Output Summary (Last 24 hours) at 1/13/2021 1529  Last data filed at 1/13/2021 1500  Gross per 24 hour   Intake --   Output 300 ml   Net -300 ml     General: awake/alert    Head: Atraumatic, normocephalic  Neck: No masses, no jugular venous distention  Chest:  clear to auscultation bilaterally without respiratory distress  CVS: regular rate and rhythm  Abdominal: soft, nontender, normal bowel sounds  Extremities: no cyanosis or edema  Skin: warm and dry without rash  Neuro: No focal motor deficits  Musculoskeletal: No obvious joint effusions    Labs:  Lab Results (last 72  hours)     Procedure Component Value Units Date/Time    Urinalysis With Microscopic If Indicated (No Culture) - Urine, Clean Catch [[id number]] Collected: 01/13/21 1457    Specimen: Urine, Clean Catch Updated: 01/13/21 1529    Protein, Urine, Random - Urine, Clean Catch [[id number]] Collected: 01/13/21 1457    Specimen: Urine, Clean Catch Updated: 01/13/21 1529    Creatinine, Urine, Random - Urine, Clean Catch [[id number]] Collected: 01/13/21 1457    Specimen: Urine, Clean Catch Updated: 01/13/21 1512    Sodium, Urine, Random - Urine, Clean Catch [[id number]] Collected: 01/13/21 1457    Specimen: Urine, Clean Catch Updated: 01/13/21 1512    Osmolality, Urine - Urine, Clean Catch [[id number]] Collected: 01/13/21 1457    Specimen: Urine, Clean Catch Updated: 01/13/21 1512    Calcium, Ionized [[id number]]  (Abnormal) Collected: 01/13/21 1435    Specimen: Blood Updated: 01/13/21 1454     Ionized Calcium >7.21 mg/dL      Comment: 93 Value above reportable range > 7.21        Notified [facility] [id number]     Notified By [id number]     Notified Time 01/13/2021 14:55     Collected by [id number]     Comment: Meter: [id number]     :  [id number]       PTH, Intact [[id number]]  (Abnormal) Collected: 01/13/21 1214    Specimen: Blood Updated: 01/13/21 1308     PTH, Intact 1.4 pg/mL     Narrative:      Results may be falsely decreased if patient taking Biotin.      Comprehensive Metabolic Panel [[id number]]  (Abnormal) Collected: 01/13/21 1214    Specimen: Blood Updated: 01/13/21 1257     Glucose 94 mg/dL      BUN 36 mg/dL      Creatinine 3.78 mg/dL      Sodium 138 mmol/L      Potassium 3.8 mmol/L      Chloride 98 mmol/L      CO2 29.0 mmol/L      Calcium 15.7 mg/dL      Total Protein 9.0 g/dL      Albumin 4.50 g/dL      ALT (SGPT) 24 U/L      AST (SGOT) 18 U/L      Alkaline Phosphatase 177 U/L      Total Bilirubin 0.2 mg/dL      eGFR Non African Amer 13 mL/min/1.73      Comment: <15 Indicative of kidney failure.        eGFR   Amer --      Comment: <15 Indicative of kidney failure.        Globulin 4.5 gm/dL      A/G Ratio 1.0 g/dL      BUN/Creatinine Ratio 9.5     Anion Gap 11.0 mmol/L     Narrative:      GFR Normal >60  Chronic Kidney Disease <60  Kidney Failure <15      COVID-19, [facility] IN-HOUSE,NASAL Swab (NO TRANSPORT MEDIA) 2 HR TAT - Swab, Nasopharynx [[id number]]  (Normal) Collected: 01/13/21 1218    Specimen: Swab from Nasopharynx Updated: 01/13/21 1250     COVID19 Presumptive Negative    Narrative:      Fact sheet for providers: https://www.fda.gov/media/513703/download     Fact sheet for patients: https://www.fda.gov/media/463576/download    Test performed by PCR.  If inconsistent with clinical signs and symptoms patient should be tested with different authorized molecular test.    Ferritin [[id number]]  (Abnormal) Collected: 01/13/21 1214    Specimen: Blood Updated: 01/13/21 1248     Ferritin 271.70 ng/mL     Narrative:      Results may be falsely decreased if patient taking Biotin.      Iron Profile [[id number]]  (Abnormal) Collected: 01/13/21 1214    Specimen: Blood Updated: 01/13/21 1243     Iron 59 mcg/dL      Iron Saturation 19 %      Transferrin 213 mg/dL      TIBC 317 mcg/dL     Phosphorus [[id number]]  (Normal) Collected: 01/13/21 1214    Specimen: Blood Updated: 01/13/21 1240     Phosphorus 3.3 mg/dL     Vitamin D 25 Hydroxy [[id number]] Collected: 01/13/21 1214    Specimen: Blood Updated: 01/13/21 1240    Magnesium [[id number]]  (Normal) Collected: 01/13/21 1214    Specimen: Blood Updated: 01/13/21 1238     Magnesium 2.2 mg/dL     CBC Auto Differential [[id number]]  (Abnormal) Collected: 01/13/21 1214    Specimen: Blood Updated: 01/13/21 1221     WBC 11.22 10*3/mm3      RBC 3.97 10*6/mm3      Hemoglobin 12.2 g/dL      Hematocrit 35.1 %      MCV 88.4 fL      MCH 30.7 pg      MCHC 34.8 g/dL      RDW 12.4 %      RDW-SD 39.8 fl      MPV 9.5 fL      Platelets 341 10*3/mm3      Neutrophil % 73.9 %      Lymphocyte % 14.4 %      Monocyte % 9.1  %      Eosinophil % 1.0 %      Basophil % 0.7 %      Immature Grans % 0.9 %      Neutrophils, Absolute 8.29 10*3/mm3      Lymphocytes, Absolute 1.62 10*3/mm3      Monocytes, Absolute 1.02 10*3/mm3      Eosinophils, Absolute 0.11 10*3/mm3      Basophils, Absolute 0.08 10*3/mm3      Immature Grans, Absolute 0.10 10*3/mm3      nRBC 0.0 /100 WBC     Vitamin B12 [[id number]] Collected: 01/13/21 1214    Specimen: Blood Updated: 01/13/21 1218          Radiology:   Imaging Results (Last 72 Hours)     Procedure Component Value Units Date/Time    US Renal Bilateral [[id number]] Collected: 01/13/21 1405     Updated: 01/13/21 1414    Narrative:      EXAM: US RENAL BILATERAL- - 1/13/2021 1:13 PM CST     HISTORY: acute kidney failure       COMPARISON: 12/30/2020.      TECHNIQUE: Real time grayscale and color ultrasound performed with  representative images saved to PACS.      FINDINGS:  AORTA. Normal caliber by ultrasound.      RIGHT KIDNEY. Measures 10.7 x 5.0 x 5.9 cm. Normal renal contour. There  are chunky shadowing echogenic foci involving the renal parenchyma. No  hydronephrosis.     LEFT KIDNEY. Measures 10.6 x 6.3 x 4.8 cm. Normal renal contour. There  are coarse chunky calcification. No hydronephrosis.     BLADDER. Under distended urinary bladder appears grossly normal.          Impression:      1. No hydronephrosis.  2. Redemonstration of bilateral renal parenchymal calcifications,  suggestive of nephrocalcinosis, which has a broad differential.  This report was finalized on 01/13/2021 14:11 by Dr Mounika Goodson MD.          Culture:  No components found for: WOUNDCUL, 3  No components found for: CSFCUL, 3  No components found for: BC, 3  No components found for: URINECUL, 3      Assessment   -RUSSELL (likely ATN)  -Severe hypercalcemia- likely iatrogenic  -Thoracic lymphadenopathies rule out lymphoproliferative disorder  -Suprapubic abdominal pain      Plan:  At this time, we will provide aggressive hydration to lower her  serum calcium.  We will check her PTH related peptide and vitamin D level.  We will also check a serum protein electrophoresis.  We will consult hematology in light of her lymphadenopathies and high suspicion of a lymphoproliferative disorder.  The patient is currently nonoliguric and I will hold on dialysis with her renal function worsens she may then require renal replacement therapy.  We will also give 1 dose of calcitonin.      Thank you for the consult, we appreciate the opportunity to provide care to your patients.  Feel free to contact me if I can be of any further assistance.      Alfredo Loomis MD  1/13/2021  15:29 CST

## 2021-01-14 LAB
25(OH)D3 SERPL-MCNC: 41.3 NG/ML (ref 30–100)
ALBUMIN SERPL-MCNC: 3.8 G/DL (ref 3.5–5.2)
ALBUMIN/GLOB SERPL: 1 G/DL
ALP SERPL-CCNC: 135 U/L (ref 39–117)
ALT SERPL W P-5'-P-CCNC: 22 U/L (ref 1–33)
ANION GAP SERPL CALCULATED.3IONS-SCNC: 10 MMOL/L (ref 5–15)
ANION GAP SERPL CALCULATED.3IONS-SCNC: 12 MMOL/L (ref 5–15)
ANION GAP SERPL CALCULATED.3IONS-SCNC: 12 MMOL/L (ref 5–15)
APPEARANCE UR: CLEAR
AST SERPL-CCNC: 17 U/L (ref 1–32)
BACTERIA #/AREA URNS HPF: NORMAL /[HPF]
BACTERIA UR CULT: NORMAL
BACTERIA UR CULT: NORMAL
BASOPHILS # BLD AUTO: 0.08 10*3/MM3 (ref 0–0.2)
BASOPHILS NFR BLD AUTO: 0.7 % (ref 0–1.5)
BILIRUB SERPL-MCNC: 0.2 MG/DL (ref 0–1.2)
BILIRUB UR QL STRIP: NEGATIVE
BUN SERPL-MCNC: 32 MG/DL (ref 6–20)
BUN/CREAT SERPL: 8.5 (ref 7–25)
BUN/CREAT SERPL: 8.9 (ref 7–25)
BUN/CREAT SERPL: 9.1 (ref 7–25)
CALCIUM SPEC-SCNC: 12 MG/DL (ref 8.6–10.5)
CALCIUM SPEC-SCNC: 12.2 MG/DL (ref 8.6–10.5)
CALCIUM SPEC-SCNC: 13.2 MG/DL (ref 8.6–10.5)
CHLORIDE SERPL-SCNC: 100 MMOL/L (ref 98–107)
CHLORIDE SERPL-SCNC: 102 MMOL/L (ref 98–107)
CHLORIDE SERPL-SCNC: 103 MMOL/L (ref 98–107)
CO2 SERPL-SCNC: 27 MMOL/L (ref 22–29)
CO2 SERPL-SCNC: 27 MMOL/L (ref 22–29)
CO2 SERPL-SCNC: 28 MMOL/L (ref 22–29)
COLOR UR: YELLOW
CREAT SERPL-MCNC: 3.52 MG/DL (ref 0.57–1)
CREAT SERPL-MCNC: 3.59 MG/DL (ref 0.57–1)
CREAT SERPL-MCNC: 3.75 MG/DL (ref 0.57–1)
CREAT UR-MCNC: 36.5 MG/DL
DEPRECATED RDW RBC AUTO: 40 FL (ref 37–54)
EOSINOPHIL # BLD AUTO: 0.23 10*3/MM3 (ref 0–0.4)
EOSINOPHIL NFR BLD AUTO: 2.1 % (ref 0.3–6.2)
EOSINOPHIL SPEC QL MICRO: 0 % EOS/100 CELLS (ref 0–0)
EPI CELLS #/AREA URNS HPF: NORMAL /HPF (ref 0–10)
ERYTHROCYTE [DISTWIDTH] IN BLOOD BY AUTOMATED COUNT: 12.3 % (ref 12.3–15.4)
FERRITIN SERPL-MCNC: 235.5 NG/ML (ref 13–150)
FOLATE SERPL-MCNC: 10.1 NG/ML (ref 4.78–24.2)
GFR SERPL CREATININE-BSD FRML MDRD: 13 ML/MIN/1.73
GFR SERPL CREATININE-BSD FRML MDRD: 13 ML/MIN/1.73
GFR SERPL CREATININE-BSD FRML MDRD: 14 ML/MIN/1.73
GFR SERPL CREATININE-BSD FRML MDRD: ABNORMAL ML/MIN/{1.73_M2}
GLOBULIN UR ELPH-MCNC: 3.7 GM/DL
GLUCOSE SERPL-MCNC: 107 MG/DL (ref 65–99)
GLUCOSE SERPL-MCNC: 117 MG/DL (ref 65–99)
GLUCOSE SERPL-MCNC: 90 MG/DL (ref 65–99)
GLUCOSE UR QL: NEGATIVE
HCT VFR BLD AUTO: 32.4 % (ref 34–46.6)
HGB BLD-MCNC: 10.6 G/DL (ref 12–15.9)
HGB UR QL STRIP: NEGATIVE
IMM GRANULOCYTES # BLD AUTO: 0.09 10*3/MM3 (ref 0–0.05)
IMM GRANULOCYTES NFR BLD AUTO: 0.8 % (ref 0–0.5)
IRON 24H UR-MRATE: 61 MCG/DL (ref 37–145)
IRON SATN MFR SERPL: 24 % (ref 20–50)
KETONES UR QL STRIP: NEGATIVE
LEUKOCYTE ESTERASE UR QL STRIP: NEGATIVE
LYMPHOCYTES # BLD AUTO: 2.07 10*3/MM3 (ref 0.7–3.1)
LYMPHOCYTES NFR BLD AUTO: 18.8 % (ref 19.6–45.3)
MCH RBC QN AUTO: 29.4 PG (ref 26.6–33)
MCHC RBC AUTO-ENTMCNC: 32.7 G/DL (ref 31.5–35.7)
MCV RBC AUTO: 90 FL (ref 79–97)
MICRO URNS: NORMAL
MICRO URNS: NORMAL
MONOCYTES # BLD AUTO: 1.52 10*3/MM3 (ref 0.1–0.9)
MONOCYTES NFR BLD AUTO: 13.8 % (ref 5–12)
MUCOUS THREADS URNS QL MICRO: PRESENT
NEUTROPHILS NFR BLD AUTO: 63.8 % (ref 42.7–76)
NEUTROPHILS NFR BLD AUTO: 7.01 10*3/MM3 (ref 1.7–7)
NITRITE UR QL STRIP: NEGATIVE
NRBC BLD AUTO-RTO: 0 /100 WBC (ref 0–0.2)
PH UR STRIP: 7.5 [PH] (ref 5–7.5)
PLATELET # BLD AUTO: 314 10*3/MM3 (ref 140–450)
PMV BLD AUTO: 9.8 FL (ref 6–12)
POTASSIUM SERPL-SCNC: 3.2 MMOL/L (ref 3.5–5.2)
POTASSIUM SERPL-SCNC: 3.3 MMOL/L (ref 3.5–5.2)
POTASSIUM SERPL-SCNC: 3.5 MMOL/L (ref 3.5–5.2)
PROT SERPL-MCNC: 7.5 G/DL (ref 6–8.5)
PROT UR QL STRIP: NORMAL
RBC # BLD AUTO: 3.6 10*6/MM3 (ref 3.77–5.28)
RBC #/AREA URNS HPF: NORMAL /HPF (ref 0–2)
RETICS # AUTO: 0.05 10*6/MM3 (ref 0.02–0.13)
RETICS/RBC NFR AUTO: 1.3 % (ref 0.7–1.9)
SODIUM SERPL-SCNC: 139 MMOL/L (ref 136–145)
SODIUM SERPL-SCNC: 140 MMOL/L (ref 136–145)
SODIUM SERPL-SCNC: 142 MMOL/L (ref 136–145)
SP GR UR: 1.01 (ref 1–1.03)
TIBC SERPL-MCNC: 253 MCG/DL (ref 298–536)
TRANSFERRIN SERPL-MCNC: 170 MG/DL (ref 200–360)
URATE SERPL-MCNC: 5.3 MG/DL (ref 2.4–5.7)
URINALYSIS REFLEX: NORMAL
UROBILINOGEN UR STRIP-MCNC: 0.2 MG/DL (ref 0.2–1)
WBC # BLD AUTO: 11 10*3/MM3 (ref 3.4–10.8)
WBC #/AREA URNS HPF: NORMAL /HPF (ref 0–5)

## 2021-01-14 PROCEDURE — 63710000001 CALCITONIN PER 400 UNITS: Performed by: INTERNAL MEDICINE

## 2021-01-14 PROCEDURE — 85045 AUTOMATED RETICULOCYTE COUNT: CPT | Performed by: INTERNAL MEDICINE

## 2021-01-14 PROCEDURE — 25010000002 ONDANSETRON PER 1 MG: Performed by: NURSE PRACTITIONER

## 2021-01-14 PROCEDURE — 85025 COMPLETE CBC W/AUTO DIFF WBC: CPT | Performed by: NURSE PRACTITIONER

## 2021-01-14 PROCEDURE — 63710000001 PROMETHAZINE PER 25 MG: Performed by: FAMILY MEDICINE

## 2021-01-14 PROCEDURE — 83540 ASSAY OF IRON: CPT | Performed by: INTERNAL MEDICINE

## 2021-01-14 PROCEDURE — 63710000001 PREDNISONE PER 5 MG: Performed by: NURSE PRACTITIONER

## 2021-01-14 PROCEDURE — 82728 ASSAY OF FERRITIN: CPT | Performed by: INTERNAL MEDICINE

## 2021-01-14 PROCEDURE — 80048 BASIC METABOLIC PNL TOTAL CA: CPT | Performed by: INTERNAL MEDICINE

## 2021-01-14 PROCEDURE — 82306 VITAMIN D 25 HYDROXY: CPT | Performed by: NURSE PRACTITIONER

## 2021-01-14 PROCEDURE — 84550 ASSAY OF BLOOD/URIC ACID: CPT | Performed by: NURSE PRACTITIONER

## 2021-01-14 PROCEDURE — 80053 COMPREHEN METABOLIC PANEL: CPT | Performed by: NURSE PRACTITIONER

## 2021-01-14 PROCEDURE — 99232 SBSQ HOSP IP/OBS MODERATE 35: CPT | Performed by: INTERNAL MEDICINE

## 2021-01-14 PROCEDURE — 25010000002 FUROSEMIDE PER 20 MG: Performed by: INTERNAL MEDICINE

## 2021-01-14 PROCEDURE — 84466 ASSAY OF TRANSFERRIN: CPT | Performed by: INTERNAL MEDICINE

## 2021-01-14 RX ORDER — PROMETHAZINE HYDROCHLORIDE 25 MG/1
25 TABLET ORAL EVERY 6 HOURS PRN
Status: DISCONTINUED | OUTPATIENT
Start: 2021-01-14 | End: 2021-01-16 | Stop reason: HOSPADM

## 2021-01-14 RX ADMIN — SODIUM CHLORIDE 200 ML/HR: 9 INJECTION, SOLUTION INTRAVENOUS at 09:17

## 2021-01-14 RX ADMIN — SODIUM CHLORIDE 200 ML/HR: 9 INJECTION, SOLUTION INTRAVENOUS at 14:33

## 2021-01-14 RX ADMIN — PREDNISONE 5 MG: 5 TABLET ORAL at 11:46

## 2021-01-14 RX ADMIN — LEVOTHYROXINE SODIUM 137 MCG: 112 TABLET ORAL at 06:01

## 2021-01-14 RX ADMIN — FUROSEMIDE 40 MG: 10 INJECTION, SOLUTION INTRAVENOUS at 06:01

## 2021-01-14 RX ADMIN — CLOPIDOGREL 75 MG: 75 TABLET, FILM COATED ORAL at 11:46

## 2021-01-14 RX ADMIN — PROMETHAZINE HYDROCHLORIDE 25 MG: 25 TABLET ORAL at 10:22

## 2021-01-14 RX ADMIN — CALCITONIN SALMON 100 UNITS: 200 INJECTION, SOLUTION INTRAMUSCULAR; SUBCUTANEOUS at 06:01

## 2021-01-14 RX ADMIN — DOCUSATE SODIUM 50 MG AND SENNOSIDES 8.6 MG 2 TABLET: 8.6; 5 TABLET, FILM COATED ORAL at 11:47

## 2021-01-14 RX ADMIN — FUROSEMIDE 40 MG: 10 INJECTION, SOLUTION INTRAVENOUS at 18:00

## 2021-01-14 RX ADMIN — SODIUM CHLORIDE 200 ML/HR: 9 INJECTION, SOLUTION INTRAVENOUS at 03:22

## 2021-01-14 RX ADMIN — ASPIRIN 81 MG: 81 TABLET, FILM COATED ORAL at 11:47

## 2021-01-14 RX ADMIN — SODIUM CHLORIDE 150 ML/HR: 9 INJECTION, SOLUTION INTRAVENOUS at 22:03

## 2021-01-14 RX ADMIN — DOCUSATE SODIUM 50 MG AND SENNOSIDES 8.6 MG 2 TABLET: 8.6; 5 TABLET, FILM COATED ORAL at 20:06

## 2021-01-14 RX ADMIN — PROMETHAZINE HYDROCHLORIDE 25 MG: 25 TABLET ORAL at 20:15

## 2021-01-14 RX ADMIN — ONDANSETRON HYDROCHLORIDE 4 MG: 2 SOLUTION INTRAMUSCULAR; INTRAVENOUS at 02:29

## 2021-01-14 RX ADMIN — ONDANSETRON HYDROCHLORIDE 4 MG: 2 SOLUTION INTRAMUSCULAR; INTRAVENOUS at 18:00

## 2021-01-14 RX ADMIN — SODIUM CHLORIDE, PRESERVATIVE FREE 10 ML: 5 INJECTION INTRAVENOUS at 20:05

## 2021-01-14 RX ADMIN — ONDANSETRON HYDROCHLORIDE 4 MG: 2 SOLUTION INTRAMUSCULAR; INTRAVENOUS at 08:19

## 2021-01-14 NOTE — PAYOR COMM NOTE
"1/14/21 [facility] [phone]  [phone]    PATIENT INPATIENT ADMISSION ON 1/13/21.    PENDING AUTH [id number]        Ines Carter (50 y.o. Female)     Date of Birth Social Security Number Address Home Phone MRN    1970  [address] [facility] [address]  [id number]    Yarsani Marital Status          Roman [employer]        Admission Date Admission Type Admitting Provider Attending Provider Department, Room/Bed    1/13/21 Emergency Chas Navarro MD Eickholz, James Noah, MD [facility] 4B, 443/1    Discharge Date Discharge Disposition Discharge Destination                       Attending Provider: Chas Navarro MD    Allergies: No Known Allergies    Isolation: None   Infection: None   Code Status: CPR    Ht: 167.6 cm (65.98\")   Wt: 84.3 kg (185 lb 14.4 oz)    Admission Cmt: None   Principal Problem: None                Active Insurance as of 1/13/2021     Primary Coverage     Payor Plan Insurance Group Employer/Plan Group    [employer] BLUE CROSS ANTHEM BLUE CROSS BLUE SHIELD PPO [id number]     Payor Plan Address Payor Plan Phone Number Payor Plan Fax Number Effective Dates    [address] [phone]  1/1/2019 - None Entered    [place] [address]       Subscriber Name Subscriber Birth Date Member ID       INES CARTER 1970 [id number]                 Emergency Contacts      (Rel.) Home Phone Work Phone Mobile Phone    Marcello Carter (Spouse) -- -- [phone]          Encounter Information     Department Encounter #   1/13/2021 11:55 AM [facility] [id number]   Chas Navarro MD   Physician   Medicine   H&P   Signed   Date of Service:  01/14/21 0717   Creation Time:  01/14/21 0717            Signed        Expand All Collapse All      Show:Clear all  [x]Manual[x]Template[]Copied    Added by:  [x]Chas Navarro MD    []Yeisonver for details  History and Physical        CHIEF COMPLAINT: Elevated " creatinine     Reason for Admission: Acute kidney injury     History Obtained From: Patient/     HISTORY OF PRESENT ILLNESS:       The patient is a 50 y.o. female with significant past medical history of sarcoidosis and thyroid cancer who presents with elevated creatinine.  Patient's creatinine has been 1.3 at baseline.  She had an emergent CT scan for lower quadrant pain and her creatinine was elevated.  Patient brought into the office and admitted for IV rehydration and further delineation of care.  Patient has been seen by nephrology for elevated creatinine/calcium.  Treatment initiated.  Patient also seen by hematology oncology.  Please see their note for further details.  No other precipitating or relieving factors noted.  Patient is essentially pain-free currently.     Past Medical History:    Medical History        Past Medical History:   Diagnosis Date   • Abnormal Pap smear of cervix     • Cervical dysplasia     • Disease of thyroid gland     • Thyroid cancer (CMS/HCC)             Past Surgical History:    Surgical History         Past Surgical History:   Procedure Laterality Date   • HYSTERECTOMY         without BSO   • KNEE ARTHROPLASTY       • LUNG SURGERY Right 12/02/2020     Stent placed in right lung   • TOTAL THYROIDECTOMY       • WISDOM TOOTH EXTRACTION               Medications Prior to Admission:    Prescriptions Prior to Admission           Medications Prior to Admission   Medication Sig Dispense Refill Last Dose   • albuterol sulfate  (90 Base) MCG/ACT inhaler Inhale 2 puffs Every 4 (Four) Hours As Needed.     Past Month at Unknown time   • ALPRAZolam (XANAX) 0.25 MG tablet     1 Past Month at Unknown time   • Aspirin Low Dose 81 MG EC tablet       1/12/2021 at Unknown time   • buPROPion XL (Wellbutrin XL) 150 MG 24 hr tablet Take 1 tablet by mouth Daily. 30 tablet 12 1/13/2021 at Unknown time   • calcitriol (ROCALTROL) 0.25 MCG capsule Take 0.25 mcg by mouth 2 (Two) Times a Day.      1/12/2021 at Unknown time   • calcium carbonate (OS-TIMOTEO) 600 MG tablet Take 600 mg by mouth 2 (Two) Times a Day.     1/13/2021 at Unknown time   • clopidogrel (PLAVIX) 75 MG tablet       1/12/2021 at Unknown time   • cyclobenzaprine (FLEXERIL) 10 MG tablet       Past Week at Unknown time   • levothyroxine (SYNTHROID, LEVOTHROID) 150 MCG tablet Take 150 mcg by mouth Daily.     1/13/2021 at Unknown time   • predniSONE (DELTASONE) 20 MG tablet 5 mg.   0 1/13/2021 at Unknown time   • promethazine (PHENERGAN) 25 MG tablet Take 1 tablet by mouth Every 6 (Six) Hours As Needed for Nausea or Vomiting. 30 tablet 2 Past Month at Unknown time   • fluticasone-salmeterol (ADVAIR) 250-50 MCG/DOSE DISKUS Inhale 1 puff.                 Allergies:  Patient has no known allergies.     Social History:                    REVIEW OF SYSTEMS:     CONSTITUTIONAL:  Negative for anorexia, chills, fevers, night sweats and weight loss  EYES:  negative for eye dryness, icterus and redness  HEENT:   negative for dental problems, epistaxis, facial trauma and thrush  RESPIRATORY: Some dyspnea on exertion noted   CARDIOVASCULAR: negative for chest pain, dyspnea, exertional chest pressure/discomfort, irregular heart beat, palpitations, paroxysmal nocturnal dyspnea and syncope  GASTROINTESTINAL: Postprandial nauseousness no vomiting no change in bowel habits.    MUSCULOSKELETAL:  negative for muscle weakness, myalgias and neck pain  NEUROLOGICAL:   negative for dizziness, headaches, seizures, speech problems, tremors and vertigo  INTEGUMENT: negative for pruritus, rash, skin color change and skin lesion(s)            Vital Signs   Temp:  [97.4 °F (36.3 °C)-98.4 °F (36.9 °C)] 98.1 °F (36.7 °C)  Heart Rate:  [74-86] 81  Resp:  [16] 16  BP: (136-155)/(65-84) 141/84   Physical Exam:  Constitutional: The patient is oriented to person, place, and time. They appear well-developed.   HEENT:   Head: Normocephalic and atraumatic.   Eyes: Pupils are equal,  round, and reactive to light.   Neck: Neck supple without masses or carotid bruit.  Cardiovascular: Regular rhythm and normal heart sounds.    Pulmonary/Chest: Effort normal and breath sounds normal. CTAB  Abdominal: Soft. Bowel sounds are normal. There is  no distension or  Tenderness appreciated. There is no rebound and no guarding.   Musculoskeletal: Normal range of motion. There is  no edema or tenderness.   Neurological: Pt is alert and oriented to person, place, and time. They have normal reflexes. CN 2-12 appear grossly intact  Skin: Skin is warm and dry without significant rashes      Results Review:              I reviewed the patient's new imaging results and agree with the interpretation.        Updated: 01/14/21 0702      Ferritin [[id number]] Collected: 01/14/21 0451     Specimen: Blood Updated: 01/14/21 0702     Comprehensive Metabolic Panel [[id number]]  (Abnormal) Collected: 01/14/21 0451     Specimen: Blood Updated: 01/14/21 0551       Glucose 117 mg/dL         BUN 32 mg/dL         Creatinine 3.59 mg/dL         Sodium 140 mmol/L         Potassium 3.2 mmol/L         Chloride 102 mmol/L         CO2 28.0 mmol/L         Calcium 12.2 mg/dL         Total Protein 7.5 g/dL         Albumin 3.80 g/dL         ALT (SGPT) 22 U/L         AST (SGOT) 17 U/L         Alkaline Phosphatase 135 U/L         Total Bilirubin 0.2 mg/dL         eGFR Non African Amer 13 mL/min/1.73         Comment: <15 Indicative of kidney failure.          eGFR   Amer --      Updated: 01/14/21 0551        Uric Acid 5.3 mg/dL       CBC Auto Differential [[id number]]  (Abnormal) Collected: 01/14/21 0451     Specimen: Blood Updated: 01/14/21 0524       WBC 11.00 10*3/mm3         RBC 3.60 10*6/mm3         Hemoglobin 10.6 g/dL         Hematocrit 32.4 %         MCV 90.0 fL         MCH 29.4 pg         MCHC 32.7 g/dL         RDW 12.3 %         RDW-SD 40.0 fl         MPV 9.8 fL         Platelets 314 10*3/mm3         Neutrophil % 63.8 %          Lymphocyte % 18.8 %         Monocyte % 13.8 %         Eosinophil % 2.1 %         Basophil % 0.7 %         Immature Grans % 0.8 %         Neutrophils, Absolute 7.01 10*3/mm3         Lymphocytes, Absolute 2.07 10*3/mm3         Monocytes, Absolute 1.52 10*3/mm3         Eosinophils, Absolute 0.23 10*3/mm3         Basophils, Absolute 0.08 10*3/mm3         Immature Grans, Absolute 0.09 10*3/mm3         nRBC 0.0 /100 WBC       Vitamin D 25 Hydroxy [[id number]] Collected: 01/14/21 0451     Specimen: Blood Updated: 01/14/21 0511     Eosinophil Smear - Urine, Urine, Clean Catch [[id number]]  (Normal) Collected: 01/13/21 1457     Specimen: Urine, Clean Catch Updated: 01/14/21 0129       Eosinophil Smear 0 % EOS/100 Cells       Creatinine, Urine, Random - Urine, Clean Catch [[id number]] Collected: 01/13/21 1457     Specimen: Urine, Clean Catch Updated: 01/14/21 0108       Creatinine, Urine 36.5 mg/dL       Narrative:       Reference intervals for random urine have not been established.  Clinical usage is dependent upon physician's interpretation in combination with other laboratory tests.         Basic Metabolic Panel [[id number]]  (Abnormal) Collected: 01/13/21 2320     Specimen: Blood Updated: 01/14/21 0036       Glucose 107 mg/dL         BUN 32 mg/dL         Creatinine 3.75 mg/dL         Sodium 139 mmol/L         Potassium 3.5 mmol/L         Chloride 100 mmol/L         CO2 27.0 mmol/L         Calcium 13.2 mg/dL         eGFR   Amer --       Comment: <15 Indicative of kidney failure.          eGFR Non African Amer 13 mL/min/1.73         Comment: <15 Indicative of kidney failure.          BUN/Creatinine Ratio 8.5       Anion Gap 12.0 mmol/L       Narrative:       GFR Normal >60  Chronic Kidney Disease <60  Kidney Failure <15        Angiotensin Converting Enzyme [[id number]] Collected: 01/13/21 2320     Specimen: Blood Updated: 01/14/21 0013     Vitamin D 25 Hydroxy [[id number]]  (Normal) Collected: 01/13/21 1214     Specimen:  Blood Updated: 01/13/21 2000       25 Hydroxy, Vitamin D 49.4 ng/ml       Narrative:       Reference Range for Total Vitamin D 25(OH)      Deficiency <20.0 ng/mL   Insufficiency 21-29 ng/mL   Sufficiency  ng/mL  Toxicity >100 ng/ml              Basic Metabolic Panel [[id number]]  (Abnormal) Collected: 01/13/21 1617     Specimen: Blood Updated: 01/13/21 1658       Glucose 86 mg/dL         BUN 36 mg/dL         Creatinine 3.88 mg/dL         Sodium 140 mmol/L         Potassium 3.9 mmol/L         Chloride 101 mmol/L         CO2 28.0 mmol/L         Calcium 14.9 mg/dL         eGFR   Amer --       Comment: <15 Indicative of kidney failure.          eGFR Non African Amer 12 mL/min/1.73         Comment: <15 Indicative of kidney failure.          BUN/Creatinine Ratio 9.3       Anion Gap 11.0 mmol/L       Narrative:       GFR Normal >60  Chronic Kidney Disease <60      Updated: 01/13/21 1630      Osmolality, Urine - Urine, Clean Catch [[id number]]  (Abnormal) Collected: 01/13/21 1457     Specimen: Urine, Clean Catch Updated: 01/13/21 1601       Osmolality, Urine 243 mOsm/kg       Sodium, Urine, Random - Urine, Clean Catch [[id number]] Collected: 01/13/21 1457     Specimen: Urine, Clean Catch         Collected: 01/13/21 1457      Specimen: Urine, Clean Catch Updated: 01/13/21 1534       Color, UA Yellow       Appearance, UA Clear       pH, UA 8.0       Specific Gravity, UA 1.008       Glucose, UA Negative       Ketones, UA Negative       Bilirubin, UA Negative       Blood, UA Negative       Protein, UA Trace       Leuk Esterase, UA Trace       Nitrite, UA Negative       Urobilinogen, UA 0.2 E.U./dL     Urinalysis, Microscopic Only - Urine, Clean Catch [[id number]]  (Abnormal) Collected: 01/13/21 1457     Specimen: Urine, Clean Catch Updated: 01/13/21 1534       RBC, UA 0-2 /HPF         WBC, UA 3-5 /HPF         Bacteria, UA None Seen /HPF         Squamous Epithelial Cells, UA 3-6 /HPF         Hyaline Casts, UA 0-2 /LPF          Methodology Automated Microscopy     Calcium, Ionized [[id number]]  (Abnormal) Collected: 01/13/21 1435     Specimen: Blood Updated: 01/13/21 1454       Ionized Calcium >7.21 mg/dL         Comment: 93 Value above               Results may be falsely decreased if patient taking Biotin.        Comprehensive Metabolic Panel [[id number]]  (Abnormal) Collected: 01/13/21 1214     Specimen: Blood Updated: 01/13/21 1257       Glucose 94 mg/dL         BUN 36 mg/dL         Creatinine 3.78 mg/dL         Sodium 138 mmol/L         Potassium 3.8 mmol/L         Chloride 98 mmol/L         CO2 29.0 mmol/L         Calcium 15.7 mg/dL         Total Protein 9.0 g/dL         Albumin 4.50 g/dL         ALT (SGPT) 24 U/L         AST (SGOT) 18 U/L         Alkaline Phosphatase 177 U/L         Total Bilirubin 0.2 mg/dL         eGFR Non African Amer 13 mL/min/1.73         Comment: <15 Indicative of kidney failure.          eGFR   Amer --       Comment: <15 Indicative of kidney failure.          Globulin 4.5 gm/dL         A/G Ratio 1.0 g/dL         BUN/Creatinine Ratio 9.5       Anion Gap 11.0 mmol/L       Narrative:       GFR Normal >60  Chronic Kidney Disease <60     Abnormal) Collected: 01/13/21 1214      Specimen: Blood Updated: 01/13/21 1243       Iron 59 mcg/dL         Iron Saturation 19 %         Transferrin 213 mg/dL         TIBC 317 mcg/dL       Phosphorus [[id number]]  (Normal) Collected: 01/13/21 1214     Specimen: Blood Updated: 01/13/21 1240       Phosphorus 3.3 mg/dL       Magnesium [[id number]]  (Normal) Collected: 01/13/21 1214     Specimen: Blood Updated: 01/13/21 1238       Magnesium 2.2 mg/dL       CBC Auto Differential [[id number]]  (Abnormal) Collected: 01/13/21 1214     Specimen: Blood Updated: 01/13/21 1221       WBC 11.22 10*3/mm3         RBC 3.97 10*6/mm3         Hemoglobin 12.2 g/dL         Hematocrit 35.1 %         MCV 88.4 fL         MCH 30.7 pg         MCHC 34.8 g/dL         RDW 12.4 %         RDW-SD 39.8 fl          MPV 9.5 fL         Platelets 341 10*3/mm3         Neutrophil % 73.9 %         Lymphocyte % 14.4 %         Monocyte % 9.1 %         Eosinophil % 1.0 %         Basophil % 0.7 %         Immature Grans % 0.9 %         Neutrophils, Absolute 8.29 10*3/mm3         Lymphocytes, Absolute 1.62 10*3/mm3         Monocytes, Absolute 1.02 10*3/mm3         Eosinophils, Absolute 0.11 10*3/mm3         Basophils, Absolute 0.08 10*3/mm3         Immature Grans, Absolute 0.10 10*3/mm3         nRBC 0.0 /100 WBC                     Imaging Results (Last 24 Hours)      CT CHEST WITHOUT CONTRAST DIAGNOSTIC [[id number]] Collected: 01/13/21 2026        Updated: 01/13/21 2045     Narrative:       EXAMINATION: CT CHEST WO CONTRAST- 1/13/2021 8:26 PM CST     HISTORY: Interstitial lung disease, history of sarcoidosis and thyroid  carcinoma. Retrocrural lymphadenopathy. Fibrosing mediastinitis.     DOSE: 191 mGycm (Automatic exposure control technique was implemented in  an effort to keep the radiation dose as low as possible without  compromising image quality)     REPORT: Spiral CT of the chest was performed without intravenous  contrast from the thoracic inlet through the upper abdomen.  Reconstructed coronal and sagittal images are also reviewed.     Comparison: CT abdomen pelvis with contrast 12/30/2020. Chest x-rays  2/19/2016.     Review of lung windows demonstrates mild respiratory motion artifact.  There is mild hyperinflation lungs with mild emphysematous changes.  There is coarse thickening of interlobular septa in the lung bases,  greater right than likely related to chronic interstitial fibrosis and  the history of sarcoidosis. There is associated pleural thickening in  the left lateral lung base, with adjacent coarse calcifications. No lung  consolidation is identified to indicate acute pneumonia. There are  tubular structures and both lungs compatible with multiple dilated  pulmonary veins, which correspond with similar to  prior structures on  the chest x-ray from 2016, gray similar in appearance. There is a stent  within the right middle lobe pulmonary vein. There are enlarged lymph  nodes with extensive calcifications at the bilateral hilar, subcarinal  and right paratracheal ivonne stations, compatible with chronic  sarcoidosis. Patency of the vascular structures is not determined  without intravenous contrast. The central pulmonary arteries are dilated  compatible pulmonary arterial hypertension. For example, the main  pulmonary artery measures 4.4 cm in diameter. The thoracic aorta is  normal caliber. There is mildly increased attenuation of fat planes  within the mediastinum or diffusely compatible with the history of  fibrosing mediastinitis. Heart size is normal. There is trace  pericardial fluid. In the upper abdomen, there are coarse calcifications  within the cortex of both kidneys, greater on the left and likely  related to renal involvement by sarcoidosis. There is a enlarged  noncalcified lymph node to the right of the esophagus in the posterior  mediastinum axial image 118, this lymph node measures 1.3 cm. There is  right retrocrural lymphadenopathy, with a maximum diameter 10.8 mm.  There are normal sized lymph nodes in the upper retroperitoneum. Review  of bone windows is unremarkable.        Impression:             Impression:       1. Enlarged coarsely calcified mediastinal and hilar lymph nodes  compatible with the history of sarcoidosis. There is also interstitial  thickening in the lungs which appears to be related to chronic fibrosis  related to sarcoid disease. No superimposed acute infiltrate or lung  consolidation is identified.  2. Dilated pulmonary arteries compatible with pulmonary arterial  hypertension. There is also evidence of chronic pulmonary venous  hypertension, with multiple dilated tortuous pulmonary veins within the  lungs bilaterally, and there is evidence of previous stent insertion in  one of  the main right pulmonary veins inferior to the right hilum.  Increased attenuation and stranding of mediastinal fat planes is  compatible with the history of fibrosing mediastinitis.  3. There is a small noncalcified lymph node in the posterior mediastinum  measuring 10 mm and a noncalcified 10 mm lymph node in the right  retrocrural space, compatible with mild retrocrural lymphadenopathy.  This may also be related to sarcoid disease.  4. Coarse calcifications within the kidneys bilaterally is most likely a  manifestation of renal sarcoidosis. There is no evidence of  hydronephrosis.        was finalized on 01/13/2021 20:42 by Dr. Chas Best MD.      US Renal Bilateral [[id number]] Collected: 01/13/21 1405       Updated: 01/13/21 1414     Narrative:       EXAM: US RENAL BILATERAL- - 1/13/2021 1:13 PM CST     HISTORY: acute kidney failure       COMPARISON: 12/30/2020.      TECHNIQUE: Real time grayscale and color ultrasound performed with  representative images saved to PACS.      FINDINGS:  AORTA. Normal caliber by ultrasound.      RIGHT KIDNEY. Measures 10.7 x 5.0 x 5.9 cm. Normal renal contour. There  are chunky shadowing echogenic foci involving the renal parenchyma. No  hydronephrosis.     LEFT KIDNEY. Measures 10.6 x 6.3 x 4.8 cm. Normal renal contour. There  are coarse chunky calcification. No hydronephrosis.     BLADDER. Under distended urinary bladder appears grossly normal.           Impression:       1. No hydronephrosis.  2. Redemonstration of bilateral renal parenchymal calcifications,  suggestive of nephrocalcinosis, which has a broad differential.  This report was finalized on 01/13/2021 14:11 by Dr Mounika Goodson MD.          ASSESSMENT AND PLAN:       1.     RUSSELL (acute kidney injury) (CMS/HCC)     2.   Sarcoidosis  3.   Hypercalcemia  4.   History of thyroid cancer     Plan  Nephrology consult reviewed.  Aggressive rehydration and treatment for hypercalcemia ordered.  Protein electrophoresis  pending with hematology oncology consultation reviewed today as well.  Work-up is currently in progress.  All questions answered to the best of my ability.     Appreciate nephrology/oncology help.     Recheck labs in a.m.              Chas Navarro MD  07:17 CST 1/14/2021       Department Encounter #   1/13/2021 11:55 AM  Pad 4[facility] [id number]   Alfredo Loomis MD   Physician   Nephrology   Consults   Signed   Date of Service:  01/13/21 1529   Creation Time:  01/13/21 1529         Consult Orders   Inpatient Nephrology Consult [[id number]] ordered by Moises Peterson APRN at 01/13/21 1201          Signed        Expand All Collapse All      Show:Clear all  [x]Manual[x]Template[]Copied    Added by:  [x]Alfredo Loomis MD    []Sarbjit for details  Nephrology ([facility]) Consult Note        Patient:  Ines Rowland  YOB: 1970  Date of Service: 1/13/2021  MRN: [id number]        Acct: [id number]      Primary Care Physician: Chas Navarro MD  Advance Directive:       Code Status and Medical Interventions:   Ordered at: 01/13/21 1201     Level Of Support Discussed With:     Patient     Code Status:     CPR     Medical Interventions (Level of Support Prior to Arrest):     Full      Admit Date: 1/13/2021                        Hospital Day: 0  Referring Provider: Chas Navarro MD                    Subjective:  Ines Rowland is a 50 y.o. female  whom we were consulted for acute kidney injury.  Patient has a history of thyroid cancer.  3 weeks ago she has been having some GI symptoms mostly nausea and vomiting.  On December 30, she had a CT scan on emergent basis to investigate some abdominal discomfort.  Labs on that day showed a serum creatinine was 3.9.  Prior to that she had a serum creatinine of 1.3 on 10/27/20.  Patient denies any change to his urinary habits.  She also denied any regular use of NSAIDs.  Her nausea and vomiting have  improved patient continues to have some suprapubic discomfort.  Her CT scan from December 30, 2020 showed the right lower lobe groundglass opacities with left cardiac and right retrocrural lymph nodes along with the right kidney nephrocalcinosis.  Patient assured me that these findings were chronic.  Her repeat labs continue to show abnormal serum creatinine.  Her creatinine was 3.5 on January 12 along with a serum calcium at 12.5.  Her repeat labs that showed the creatinine is 3.7 and his serum calcium was 15.7.  Her intact PTH was suppressed and her serum phosphorus was normal at 3.3.  Currently she has no dysuria and she continues to have considerable urine output.       RUSSELL (likely ATN)  -Severe hypercalcemia  -Thoracic lymphadenopathies rule out lymphoproliferative disorder  -Suprapubic abdominal pain        Plan:  At this time, we will provide aggressive hydration to lower her serum calcium.  We will check her PTH related peptide and vitamin D level.  We will also check a serum protein electrophoresis.  We will consult hematology in light of her lymphadenopathies and high suspicion of a lymphoproliferative disorder.  The patient is currently nonoliguric and I will hold on dialysis with her renal function worsens she may then require renal replacement therapy.  We will also give 1 dose of calcitonin.        Thank you for the consult, we appreciate the opportunity to provide care to your patients.  Feel free to contact me if I can be of any further assistance.       Alfredo Loomis MD  1/13/2021  15:29 CST               2021 11:55 AM [address] [phone]   Luis M Kim MD   Physician   Oncology   Consults   Signed   Date of Service:  01/13/21 1602   Creation Time:  01/13/21 1602            Signed        Expand All Collapse All      Show:Clear all  [x]Manual[x]Template[x]Copied    Added by:  [x]Luis M Kim MD    []willian for details       [facility] Oncology/Hematology Services  CONSULT  NOTE     PATIENT NAME:  Ines Rowland  YOB: 1970  PATIENT MRN:  [id number]     Date of Admission:  1/13/2021  Consultation Date:  1/13/2021  Referring Provider: Chas Navarro MD     Subjective      Reason for Consultation: Retrocrural lymphadenopathy.     History of present illness: Ines Rowland is a pleasant 50 y.o.  female who is seen in consult for hypercalcemia and lymphadenopathy.  She is seen with spouse Marcello and nurse Joanne at the bedside.  She is a reliable historian.     She presented with right lower quadrant abdominal pain with nausea and vomiting since Christmas eve.  She had seen Ms. Ojeda ORACIO Gallegos.     Transvaginal ultrasound 12/20/2020. The uterus is absent.  The left ovary is not visualized.  The right ovary appears normal and is without cysts or masses.     Gallbladder ultrasound 12/29/2020.  Unremarkable ultrasound the gallbladder, liver and biliary system.  Several shadowing cortical calcifications within the right kidney.     Hepatobiliary scan 12/29/2020.The gallbladder ejection fraction equals 30%, which is in the normal  Range.     CT abdomen and pelvis 12/30/2020. Right lower lobe groundglass opacities with tortuosity of the right  lower lobe vasculature, incompletely visualized and characterized on this exam. Consider CT chest for further evaluation.  Enlarged left cardiac and right retrocrural lymph nodes, which could  be further evaluated on the above recommended CT chest. Calcifications in the left greater than right kidney suggesting nephrocalcinosis.  CT scan was reviewed with radiology and adenopathies measured at 7 mm.  Not accessible for needle biopsy.        CBC on admission remarkable for WBC 11.2 with ANC of 8.2.  CMP remarkable for BUN 36, creatinine 3.7, calcium 15.7, protein 9, alkaline phosphatase 177 and GFR 13 mL/min.  Iron saturation 19% and ferritin 271.7.  B12 pending.  Intact PTH 1.4.  PTH RP pending.  Ionized calcium  greater than 7.2.     Urinalysis on admission trace protein and trace leukocyte esterase.  Urine sodium 71, total protein 13.8, and osmolarity 243.  RBC 0-2, WBC 3-5 and squamous epithelial cells 3-6.     Renal ultrasound 01/13/2021 showed no hydronephrosis.  Redemonstration of bilateral renal parenchymal calcifications suggestive of nephro calcinosis.     Previous CT of the chest 08/10/2020 [place].  Overall stable exam compared to 12/23/2019 with scattered calcified granulomas and extensive calcified mediastinal fibrosis that results in chronic narrowing of the central airways and pulmonary vasculature.  Extensive arterial collaterals from the right upper lobe supplying the right mid and lower lobes and from the left lower lobe supplying the left upper lobe.  Extensive right venous collaterals are also noted.  Interseptal lobar thickening and groundglass opacities in the right mid and lower and left upper lobes likely represent sarcoidosis related interstitial lung disease.     With above background, she is seen.     Past Medical History:  Medical History        Past Medical History:   Diagnosis Date   • Abnormal Pap smear of cervix     • Cervical dysplasia     • Disease of thyroid gland     • Thyroid cancer (CMS/HCC)          Prior Surgeries:  Surgical History         Past Surgical History:   Procedure Laterality Date   • HYSTERECTOMY         without BSO   • KNEE ARTHROPLASTY       • LUNG SURGERY Right 12/02/2020     Stent placed in right lung   • TOTAL THYROIDECTOMY       • WISDOM TOOTH EXTRACTION               Allergies                ASSESSMENT:   1.  Lymphadenopathy, left cardiac and right retrocrural, less than 1 cm, not accessible for needle biopsies as reviewed with radiology 01/13/2021.  2.  Hypercalcemia, etiology undefined.   3.  Sarcoidosis.  4.  Leukocytosis likely reactive process.  5.  Acute kidney injury.  6.  History of T1b, N0, follicular thyroid cancer post right hemithyroidectomy by   Resser on 02/16/2016.        PLAN:  1.   Re: The reason for consult.  2.  Calcitonin 100 units subcutaneous every 12 hours and Zometa with hydration ordered by nephrology.  3.  Schedule noncontrast CT of the chest to evaluate for lymphadenopathy.  4.  Plan for biopsy of the most accessible site for tissue diagnosis.  5.  Await serum protein electrophoresis and PTH related protein.   5.  Further recommendations pending.        I discussed the patients findings and my recommendations with the patient, spouse and and nurse.  They verbalized understanding.     Luis M Kim MD  01/13/21  16:02 CST     I spent 51 total minutes, face-to-face, caring for Ines rosario.  Greater than 50% of this time involved counseling and/or coordination of care as documented within this note regarding the patient's illness(es), pros and cons of various treatment options, instructions and/or risk reduction.           Thank you for allowing me to participate in the care of Ms. Ines Rowland  Contains abnormal data CBC Auto Differential  Order: [id number]  Status:  Final result   Visible to patient:  No (not released)   Next appt:  01/18/2021 at 09:30 AM in Radiology ([facility])  Specimen Information: Blood        Component   Ref Range & Units 04:51   WBC   3.40 - 10.80 10*3/mm3 11.00High     RBC   3.77 - 5.28 10*6/mm3 3.60Low     Hemoglobin   12.0 - 15.9 g/dL 10.6Low     Hematocrit   34.0 - 46.6 % 32.4Low     MCV   79.0 - 97.0 fL 90           Ferritin  Order: [id number]  Status:  Final result   Visible to patient:  No (not released)   Next appt:  01/18/2021 at 09:30 AM in Radiology ([facility])  Specimen Information: Blood        Component   Ref Range & Units 04:51   Ferritin   13.00 - 150.00 ng/mL 235.50High     Resulting Agency [facility]      Narrative            Current Facility-Administered Medications   Medication Dose Route Frequency Provider Last Rate Last Admin   • acetaminophen (TYLENOL) tablet 650 mg  650 mg  Oral Q4H PRN Moises Peterson APRN        Or   • acetaminophen (TYLENOL) 160 MG/5ML solution 650 mg  650 mg Oral Q4H PRN Moises Peterson APRN        Or   • acetaminophen (TYLENOL) suppository 650 mg  650 mg Rectal Q4H PRN Moises Peterson APRN       • aspirin EC tablet 81 mg  81 mg Oral Daily Moises Peterson APRN       • clopidogrel (PLAVIX) tablet 75 mg  75 mg Oral Daily Moises Peterson APRN       • furosemide (LASIX) injection 40 mg  40 mg Intravenous Q12H Alfredo Loomis MD   40 mg at 01/14/21 0601   • HYDROcodone-acetaminophen (NORCO) 5-325 MG per tablet 1 tablet  1 tablet Oral Q4H PRN Moises Peterson APRN       • levothyroxine (SYNTHROID, LEVOTHROID) tablet 137 mcg  137 mcg Oral Q AM Moises Peterson APRN   137 mcg at 01/14/21 0601   • ondansetron (ZOFRAN) tablet 4 mg  4 mg Oral Q6H PRN Moises Peterson APRN        Or   • ondansetron (ZOFRAN) injection 4 mg  4 mg Intravenous Q6H PRN Moises Peterson APRN   4 mg at 01/14/21 0229   • predniSONE (DELTASONE) tablet 5 mg  5 mg Oral Daily With Breakfast Moises Peterson APRN       • sennosides-docusate (PERICOLACE) 8.6-50 MG per tablet 2 tablet  2 tablet Oral BID Moises Peterson APRN   2 tablet at 01/13/21 2020   • sodium chloride 0.9 % flush 10 mL  10 mL Intravenous Q12H Moises Peterson APRN   10 mL at 01/13/21 1452   • sodium chloride 0.9 % flush 10 mL  10 mL Intravenous PRN Moises Peterson APRN       • sodium chloride 0.9 % infusion  200 mL/hr Intravenous Continuous Alfredo Loomis  mL/hr at 01/14/21 0322 200 mL/hr at 01/14/21 0322

## 2021-01-14 NOTE — H&P
History and Physical      CHIEF COMPLAINT: Elevated creatinine    Reason for Admission: Acute kidney injury    History Obtained From: Patient/    HISTORY OF PRESENT ILLNESS:      The patient is a 50 y.o. female with significant past medical history of sarcoidosis and thyroid cancer who presents with elevated creatinine.  Patient's creatinine has been 1.3 at baseline.  She had an emergent CT scan for lower quadrant pain and her creatinine was elevated.  Patient brought into the office and admitted for IV rehydration and further delineation of care.  Patient has been seen by nephrology for elevated creatinine/calcium.  Treatment initiated.  Patient also seen by hematology oncology.  Please see their note for further details.  No other precipitating or relieving factors noted.  Patient is essentially pain-free currently.    Past Medical History:    Past Medical History:   Diagnosis Date   • Abnormal Pap smear of cervix    • Cervical dysplasia    • Disease of thyroid gland    • Thyroid cancer (CMS/HCC)        Past Surgical History:    Past Surgical History:   Procedure Laterality Date   • HYSTERECTOMY      without BSO   • KNEE ARTHROPLASTY     • LUNG SURGERY Right 12/02/2020    Stent placed in right lung   • TOTAL THYROIDECTOMY     • WISDOM TOOTH EXTRACTION         Medications Prior to Admission:    Medications Prior to Admission   Medication Sig Dispense Refill Last Dose   • albuterol sulfate  (90 Base) MCG/ACT inhaler Inhale 2 puffs Every 4 (Four) Hours As Needed.   Past Month at Unknown time   • ALPRAZolam (XANAX) 0.25 MG tablet   1 Past Month at Unknown time   • Aspirin Low Dose 81 MG EC tablet    1/12/2021 at Unknown time   • buPROPion XL (Wellbutrin XL) 150 MG 24 hr tablet Take 1 tablet by mouth Daily. 30 tablet 12 1/13/2021 at Unknown time   • calcitriol (ROCALTROL) 0.25 MCG capsule Take 0.25 mcg by mouth 2 (Two) Times a Day.   1/12/2021 at Unknown time   • calcium carbonate (OS-TIMOTEO) 600 MG tablet  Take 600 mg by mouth 2 (Two) Times a Day.   1/13/2021 at Unknown time   • clopidogrel (PLAVIX) 75 MG tablet    1/12/2021 at Unknown time   • cyclobenzaprine (FLEXERIL) 10 MG tablet    Past Week at Unknown time   • levothyroxine (SYNTHROID, LEVOTHROID) 150 MCG tablet Take 150 mcg by mouth Daily.   1/13/2021 at Unknown time   • predniSONE (DELTASONE) 20 MG tablet 5 mg.  0 1/13/2021 at Unknown time   • promethazine (PHENERGAN) 25 MG tablet Take 1 tablet by mouth Every 6 (Six) Hours As Needed for Nausea or Vomiting. 30 tablet 2 Past Month at Unknown time   • fluticasone-salmeterol (ADVAIR) 250-50 MCG/DOSE DISKUS Inhale 1 puff.          Allergies:  Patient has no known allergies.    Social History:   Social History     Socioeconomic History   • Marital status:      Spouse name: Not on file   • Number of children: Not on file   • Years of education: Not on file   • Highest education level: Not on file   Tobacco Use   • Smoking status: Never Smoker   • Smokeless tobacco: Never Used   Substance and Sexual Activity   • Alcohol use: Yes     Comment: occ   • Drug use: No   • Sexual activity: Yes     Birth control/protection: Post-menopausal       Family History:   Family History   Problem Relation Age of Onset   • Prostate cancer Father 72   • Arthritis Mother    • Arthritis Brother    • Colon cancer Paternal Uncle 50   • Thyroid cancer Other    • No Known Problems Sister    • No Known Problems Daughter    • No Known Problems Son    • No Known Problems Maternal Grandmother    • No Known Problems Paternal Grandmother    • No Known Problems Maternal Aunt    • No Known Problems Paternal Aunt    • Breast cancer Neg Hx    • Ovarian cancer Neg Hx    • Uterine cancer Neg Hx    • Melanoma Neg Hx    • BRCA 1/2 Neg Hx    • Endometrial cancer Neg Hx        REVIEW OF SYSTEMS:    CONSTITUTIONAL:  Negative for anorexia, chills, fevers, night sweats and weight loss  EYES:  negative for eye dryness, icterus and redness  HEENT:    negative for dental problems, epistaxis, facial trauma and thrush  RESPIRATORY: Some dyspnea on exertion noted   CARDIOVASCULAR: negative for chest pain, dyspnea, exertional chest pressure/discomfort, irregular heart beat, palpitations, paroxysmal nocturnal dyspnea and syncope  GASTROINTESTINAL: Postprandial nauseousness no vomiting no change in bowel habits.    MUSCULOSKELETAL:  negative for muscle weakness, myalgias and neck pain  NEUROLOGICAL:   negative for dizziness, headaches, seizures, speech problems, tremors and vertigo  INTEGUMENT: negative for pruritus, rash, skin color change and skin lesion(s)       Vital Signs   Temp:  [97.4 °F (36.3 °C)-98.4 °F (36.9 °C)] 98.1 °F (36.7 °C)  Heart Rate:  [74-86] 81  Resp:  [16] 16  BP: (136-155)/(65-84) 141/84          Physical Exam:  Constitutional: The patient is oriented to person, place, and time. They appear well-developed.   HEENT:   Head: Normocephalic and atraumatic.   Eyes: Pupils are equal, round, and reactive to light.   Neck: Neck supple without masses or carotid bruit.  Cardiovascular: Regular rhythm and normal heart sounds.    Pulmonary/Chest: Effort normal and breath sounds normal. CTAB  Abdominal: Soft. Bowel sounds are normal. There is  no distension or  Tenderness appreciated. There is no rebound and no guarding.   Musculoskeletal: Normal range of motion. There is  no edema or tenderness.   Neurological: Pt is alert and oriented to person, place, and time. They have normal reflexes. CN 2-12 appear grossly intact  Skin: Skin is warm and dry without significant rashes     Results Review:   I reviewed the patient's new imaging results and agree with the interpretation.    DATA:  Lab Results (last 24 hours)     Procedure Component Value Units Date/Time    Iron Profile [[id number]] Collected: 01/14/21 0451    Specimen: Blood Updated: 01/14/21 0702    Ferritin [[id number]] Collected: 01/14/21 0451    Specimen: Blood Updated: 01/14/21 0702    Comprehensive  Metabolic Panel [[id number]]  (Abnormal) Collected: 01/14/21 0451    Specimen: Blood Updated: 01/14/21 0551     Glucose 117 mg/dL      BUN 32 mg/dL      Creatinine 3.59 mg/dL      Sodium 140 mmol/L      Potassium 3.2 mmol/L      Chloride 102 mmol/L      CO2 28.0 mmol/L      Calcium 12.2 mg/dL      Total Protein 7.5 g/dL      Albumin 3.80 g/dL      ALT (SGPT) 22 U/L      AST (SGOT) 17 U/L      Alkaline Phosphatase 135 U/L      Total Bilirubin 0.2 mg/dL      eGFR Non African Amer 13 mL/min/1.73      Comment: <15 Indicative of kidney failure.        eGFR   Amer --     Comment: <15 Indicative of kidney failure.        Globulin 3.7 gm/dL      A/G Ratio 1.0 g/dL      BUN/Creatinine Ratio 8.9     Anion Gap 10.0 mmol/L     Narrative:      GFR Normal >60  Chronic Kidney Disease <60  Kidney Failure <15      Uric Acid [[id number]]  (Normal) Collected: 01/14/21 0451    Specimen: Blood Updated: 01/14/21 0551     Uric Acid 5.3 mg/dL     CBC Auto Differential [[id number]]  (Abnormal) Collected: 01/14/21 0451    Specimen: Blood Updated: 01/14/21 0524     WBC 11.00 10*3/mm3      RBC 3.60 10*6/mm3      Hemoglobin 10.6 g/dL      Hematocrit 32.4 %      MCV 90.0 fL      MCH 29.4 pg      MCHC 32.7 g/dL      RDW 12.3 %      RDW-SD 40.0 fl      MPV 9.8 fL      Platelets 314 10*3/mm3      Neutrophil % 63.8 %      Lymphocyte % 18.8 %      Monocyte % 13.8 %      Eosinophil % 2.1 %      Basophil % 0.7 %      Immature Grans % 0.8 %      Neutrophils, Absolute 7.01 10*3/mm3      Lymphocytes, Absolute 2.07 10*3/mm3      Monocytes, Absolute 1.52 10*3/mm3      Eosinophils, Absolute 0.23 10*3/mm3      Basophils, Absolute 0.08 10*3/mm3      Immature Grans, Absolute 0.09 10*3/mm3      nRBC 0.0 /100 WBC     Vitamin D 25 Hydroxy [[id number]] Collected: 01/14/21 0451    Specimen: Blood Updated: 01/14/21 0511    Eosinophil Smear - Urine, Urine, Clean Catch [[id number]]  (Normal) Collected: 01/13/21 1457    Specimen: Urine, Clean Catch Updated: 01/14/21  0129     Eosinophil Smear 0 % EOS/100 Cells     Creatinine, Urine, Random - Urine, Clean Catch [[id number]] Collected: 01/13/21 1457    Specimen: Urine, Clean Catch Updated: 01/14/21 0108     Creatinine, Urine 36.5 mg/dL     Narrative:      Reference intervals for random urine have not been established.  Clinical usage is dependent upon physician's interpretation in combination with other laboratory tests.       Basic Metabolic Panel [[id number]]  (Abnormal) Collected: 01/13/21 2320    Specimen: Blood Updated: 01/14/21 0036     Glucose 107 mg/dL      BUN 32 mg/dL      Creatinine 3.75 mg/dL      Sodium 139 mmol/L      Potassium 3.5 mmol/L      Chloride 100 mmol/L      CO2 27.0 mmol/L      Calcium 13.2 mg/dL      eGFR   Amer --     Comment: <15 Indicative of kidney failure.        eGFR Non African Amer 13 mL/min/1.73      Comment: <15 Indicative of kidney failure.        BUN/Creatinine Ratio 8.5     Anion Gap 12.0 mmol/L     Narrative:      GFR Normal >60  Chronic Kidney Disease <60  Kidney Failure <15      Angiotensin Converting Enzyme [[id number]] Collected: 01/13/21 2320    Specimen: Blood Updated: 01/14/21 0013    Vitamin D 25 Hydroxy [[id number]]  (Normal) Collected: 01/13/21 1214    Specimen: Blood Updated: 01/13/21 2000     25 Hydroxy, Vitamin D 49.4 ng/ml     Narrative:      Reference Range for Total Vitamin D 25(OH)     Deficiency <20.0 ng/mL   Insufficiency 21-29 ng/mL   Sufficiency  ng/mL  Toxicity >100 ng/ml    Results may be falsely increased if patient taking Biotin.      Vitamin B12 [[id number]]  (Normal) Collected: 01/13/21 1214    Specimen: Blood Updated: 01/13/21 1953     Vitamin B-12 463 pg/mL     Narrative:      Results may be falsely increased if patient taking Biotin.      Basic Metabolic Panel [[id number]]  (Abnormal) Collected: 01/13/21 1617    Specimen: Blood Updated: 01/13/21 1658     Glucose 86 mg/dL      BUN 36 mg/dL      Creatinine 3.88 mg/dL      Sodium 140 mmol/L      Potassium  3.9 mmol/L      Chloride 101 mmol/L      CO2 28.0 mmol/L      Calcium 14.9 mg/dL      eGFR   Amer --     Comment: <15 Indicative of kidney failure.        eGFR Non African Amer 12 mL/min/1.73      Comment: <15 Indicative of kidney failure.        BUN/Creatinine Ratio 9.3     Anion Gap 11.0 mmol/L     Narrative:      GFR Normal >60  Chronic Kidney Disease <60  Kidney Failure <15      Protein Elec + Interp, Serum [[id number]] Collected: 01/13/21 1617    Specimen: Blood Updated: 01/13/21 1630    PTH-related Peptide [[id number]] Collected: 01/13/21 1617    Specimen: Blood Updated: 01/13/21 1630    Osmolality, Urine - Urine, Clean Catch [[id number]]  (Abnormal) Collected: 01/13/21 1457    Specimen: Urine, Clean Catch Updated: 01/13/21 1601     Osmolality, Urine 243 mOsm/kg     Sodium, Urine, Random - Urine, Clean Catch [[id number]] Collected: 01/13/21 1457    Specimen: Urine, Clean Catch Updated: 01/13/21 1550     Sodium, Urine 71 mmol/L     Narrative:      Reference intervals for random urine have not been established.  Clinical usage is dependent upon physician's interpretation in combination with other laboratory tests.       Protein, Urine, Random - Urine, Clean Catch [[id number]] Collected: 01/13/21 1457    Specimen: Urine, Clean Catch Updated: 01/13/21 1549     Total Protein, Urine 13.8 mg/dL     Narrative:      Reference intervals for random urine have not been established.  Clinical usage is dependent upon physician's interpretation in combination with other laboratory tests.       Urinalysis With Microscopic If Indicated (No Culture) - Urine, Clean Catch [[id number]]  (Abnormal) Collected: 01/13/21 1457    Specimen: Urine, Clean Catch Updated: 01/13/21 1534     Color, UA Yellow     Appearance, UA Clear     pH, UA 8.0     Specific Gravity, UA 1.008     Glucose, UA Negative     Ketones, UA Negative     Bilirubin, UA Negative     Blood, UA Negative     Protein, UA Trace     Leuk Esterase, UA Trace     Nitrite, UA  Negative     Urobilinogen, UA 0.2 E.U./dL    Urinalysis, Microscopic Only - Urine, Clean Catch [[id number]]  (Abnormal) Collected: 01/13/21 1457    Specimen: Urine, Clean Catch Updated: 01/13/21 1534     RBC, UA 0-2 /HPF      WBC, UA 3-5 /HPF      Bacteria, UA None Seen /HPF      Squamous Epithelial Cells, UA 3-6 /HPF      Hyaline Casts, UA 0-2 /LPF      Methodology Automated Microscopy    Calcium, Ionized [[id number]]  (Abnormal) Collected: 01/13/21 1435    Specimen: Blood Updated: 01/13/21 1454     Ionized Calcium >7.21 mg/dL      Comment: 93 Value above reportable range > 7.21        Notified Who [id number]     Notified By [id number]     Notified Time 01/13/2021 14:55     Collected by [id number]     Comment: Meter: [id number]     :  [id number]       PTH, Intact [[id number]]  (Abnormal) Collected: 01/13/21 1214    Specimen: Blood Updated: 01/13/21 1308     PTH, Intact 1.4 pg/mL     Narrative:      Results may be falsely decreased if patient taking Biotin.      Comprehensive Metabolic Panel [[id number]]  (Abnormal) Collected: 01/13/21 1214    Specimen: Blood Updated: 01/13/21 1257     Glucose 94 mg/dL      BUN 36 mg/dL      Creatinine 3.78 mg/dL      Sodium 138 mmol/L      Potassium 3.8 mmol/L      Chloride 98 mmol/L      CO2 29.0 mmol/L      Calcium 15.7 mg/dL      Total Protein 9.0 g/dL      Albumin 4.50 g/dL      ALT (SGPT) 24 U/L      AST (SGOT) 18 U/L      Alkaline Phosphatase 177 U/L      Total Bilirubin 0.2 mg/dL      eGFR Non African Amer 13 mL/min/1.73      Comment: <15 Indicative of kidney failure.        eGFR   Amer --     Comment: <15 Indicative of kidney failure.        Globulin 4.5 gm/dL      A/G Ratio 1.0 g/dL      BUN/Creatinine Ratio 9.5     Anion Gap 11.0 mmol/L     Narrative:      GFR Normal >60  Chronic Kidney Disease <60  Kidney Failure <15      COVID-19, [facility]-HOUSE,NASAL Swab (NO TRANSPORT MEDIA) 2 HR TAT - Swab, Nasopharynx [[id number]]  (Normal) Collected: 01/13/21 1218    Specimen:  Swab from Nasopharynx Updated: 01/13/21 1250     COVID19 Presumptive Negative    Narrative:      Fact sheet for providers: https://www.fda.gov/media/434855/download     Fact sheet for patients: https://www.fda.gov/media/624564/download    Test performed by PCR.  If inconsistent with clinical signs and symptoms patient should be tested with different authorized molecular test.    Ferritin [[id number]]  (Abnormal) Collected: 01/13/21 1214    Specimen: Blood Updated: 01/13/21 1248     Ferritin 271.70 ng/mL     Narrative:      Results may be falsely decreased if patient taking Biotin.      Iron Profile [[id number]]  (Abnormal) Collected: 01/13/21 1214    Specimen: Blood Updated: 01/13/21 1243     Iron 59 mcg/dL      Iron Saturation 19 %      Transferrin 213 mg/dL      TIBC 317 mcg/dL     Phosphorus [[id number]]  (Normal) Collected: 01/13/21 1214    Specimen: Blood Updated: 01/13/21 1240     Phosphorus 3.3 mg/dL     Magnesium [[id number]]  (Normal) Collected: 01/13/21 1214    Specimen: Blood Updated: 01/13/21 1238     Magnesium 2.2 mg/dL     CBC Auto Differential [[id number]]  (Abnormal) Collected: 01/13/21 1214    Specimen: Blood Updated: 01/13/21 1221     WBC 11.22 10*3/mm3      RBC 3.97 10*6/mm3      Hemoglobin 12.2 g/dL      Hematocrit 35.1 %      MCV 88.4 fL      MCH 30.7 pg      MCHC 34.8 g/dL      RDW 12.4 %      RDW-SD 39.8 fl      MPV 9.5 fL      Platelets 341 10*3/mm3      Neutrophil % 73.9 %      Lymphocyte % 14.4 %      Monocyte % 9.1 %      Eosinophil % 1.0 %      Basophil % 0.7 %      Immature Grans % 0.9 %      Neutrophils, Absolute 8.29 10*3/mm3      Lymphocytes, Absolute 1.62 10*3/mm3      Monocytes, Absolute 1.02 10*3/mm3      Eosinophils, Absolute 0.11 10*3/mm3      Basophils, Absolute 0.08 10*3/mm3      Immature Grans, Absolute 0.10 10*3/mm3      nRBC 0.0 /100 WBC         Imaging Results (Last 24 Hours)     Procedure Component Value Units Date/Time    CT CHEST WITHOUT CONTRAST DIAGNOSTIC [[id number]]  Collected: 01/13/21 2026     Updated: 01/13/21 2045    Narrative:      EXAMINATION: CT CHEST WO CONTRAST- 1/13/2021 8:26 PM CST     HISTORY: Interstitial lung disease, history of sarcoidosis and thyroid  carcinoma. Retrocrural lymphadenopathy. Fibrosing mediastinitis.     DOSE: 191 mGycm (Automatic exposure control technique was implemented in  an effort to keep the radiation dose as low as possible without  compromising image quality)     REPORT: Spiral CT of the chest was performed without intravenous  contrast from the thoracic inlet through the upper abdomen.  Reconstructed coronal and sagittal images are also reviewed.     Comparison: CT abdomen pelvis with contrast 12/30/2020. Chest x-rays  2/19/2016.     Review of lung windows demonstrates mild respiratory motion artifact.  There is mild hyperinflation lungs with mild emphysematous changes.  There is coarse thickening of interlobular septa in the lung bases,  greater right than likely related to chronic interstitial fibrosis and  the history of sarcoidosis. There is associated pleural thickening in  the left lateral lung base, with adjacent coarse calcifications. No lung  consolidation is identified to indicate acute pneumonia. There are  tubular structures and both lungs compatible with multiple dilated  pulmonary veins, which correspond with similar to prior structures on  the chest x-ray from 2016, gray similar in appearance. There is a stent  within the right middle lobe pulmonary vein. There are enlarged lymph  nodes with extensive calcifications at the bilateral hilar, subcarinal  and right paratracheal ivonne stations, compatible with chronic  sarcoidosis. Patency of the vascular structures is not determined  without intravenous contrast. The central pulmonary arteries are dilated  compatible pulmonary arterial hypertension. For example, the main  pulmonary artery measures 4.4 cm in diameter. The thoracic aorta is  normal caliber. There is mildly  increased attenuation of fat planes  within the mediastinum or diffusely compatible with the history of  fibrosing mediastinitis. Heart size is normal. There is trace  pericardial fluid. In the upper abdomen, there are coarse calcifications  within the cortex of both kidneys, greater on the left and likely  related to renal involvement by sarcoidosis. There is a enlarged  noncalcified lymph node to the right of the esophagus in the posterior  mediastinum axial image 118, this lymph node measures 1.3 cm. There is  right retrocrural lymphadenopathy, with a maximum diameter 10.8 mm.  There are normal sized lymph nodes in the upper retroperitoneum. Review  of bone windows is unremarkable.       Impression:      1. Enlarged coarsely calcified mediastinal and hilar lymph nodes  compatible with the history of sarcoidosis. There is also interstitial  thickening in the lungs which appears to be related to chronic fibrosis  related to sarcoid disease. No superimposed acute infiltrate or lung  consolidation is identified.  2. Dilated pulmonary arteries compatible with pulmonary arterial  hypertension. There is also evidence of chronic pulmonary venous  hypertension, with multiple dilated tortuous pulmonary veins within the  lungs bilaterally, and there is evidence of previous stent insertion in  one of the main right pulmonary veins inferior to the right hilum.  Increased attenuation and stranding of mediastinal fat planes is  compatible with the history of fibrosing mediastinitis.  3. There is a small noncalcified lymph node in the posterior mediastinum  measuring 10 mm and a noncalcified 10 mm lymph node in the right  retrocrural space, compatible with mild retrocrural lymphadenopathy.  This may also be related to sarcoid disease.  4. Coarse calcifications within the kidneys bilaterally is most likely a  manifestation of renal sarcoidosis. There is no evidence of  hydronephrosis.              This report was finalized on  01/13/2021 20:42 by Dr. Chas Best MD.    US Renal Bilateral [[id number]] Collected: 01/13/21 1405     Updated: 01/13/21 1414    Narrative:      EXAM: US RENAL BILATERAL- - 1/13/2021 1:13 PM CST     HISTORY: acute kidney failure       COMPARISON: 12/30/2020.      TECHNIQUE: Real time grayscale and color ultrasound performed with  representative images saved to PACS.      FINDINGS:  AORTA. Normal caliber by ultrasound.      RIGHT KIDNEY. Measures 10.7 x 5.0 x 5.9 cm. Normal renal contour. There  are chunky shadowing echogenic foci involving the renal parenchyma. No  hydronephrosis.     LEFT KIDNEY. Measures 10.6 x 6.3 x 4.8 cm. Normal renal contour. There  are coarse chunky calcification. No hydronephrosis.     BLADDER. Under distended urinary bladder appears grossly normal.          Impression:      1. No hydronephrosis.  2. Redemonstration of bilateral renal parenchymal calcifications,  suggestive of nephrocalcinosis, which has a broad differential.  This report was finalized on 01/13/2021 14:11 by Dr Mounika Goodson MD.            ASSESSMENT AND PLAN:      1.     RUSSELL (acute kidney injury) (CMS/HCC)    2.   Sarcoidosis  3.   Hypercalcemia  4.   History of thyroid cancer    Plan  Nephrology consult reviewed.  Aggressive rehydration and treatment for hypercalcemia ordered.  Protein electrophoresis pending with hematology oncology consultation reviewed today as well.  Work-up is currently in progress.  All questions answered to the best of my ability.    Appreciate nephrology/oncology help.    Recheck labs in a.m.          Chas Navarro MD  07:17 CST 1/14/2021

## 2021-01-14 NOTE — PROGRESS NOTES
Nephrology ([facility]) Progress Note      Patient:  Ines Rowland  YOB: 1970  Date of Service: 1/14/2021  MRN: [id number]   Acct: [id number]   Primary Care Physician: Chas Navarro MD  Advance Directive:   Code Status and Medical Interventions:   Ordered at: 01/13/21 1201     Level Of Support Discussed With:    Patient     Code Status:    CPR     Medical Interventions (Level of Support Prior to Arrest):    Full     Admit Date: 1/13/2021       Hospital Day: 1  Referring Provider: Chas Navarro MD      Patient personally seen and examined.  Complete chart including Consults, Notes, Operative Reports, Labs, Cardiology, and Radiology studies reviewed as able.        Subjective:  Inse Rowland is a 50 y.o. female  whom we were consulted for acute kidney injury.  History of sarcoidosis and thyroid cancer. Several weeks of GI symptoms including nausea/vomiting and abdominal pain. Denies changes to urination, denies NSAIDs.  Outpatient labs on 12/30 showed creatinine 3.9. repeat labs on 1/12 continued to show elevated creatinine 3.7 and severe hypercalcemia at 15.7. admitted for treatment and further workup.    Today feeling better. No new overnight issues    Allergies:  Patient has no known allergies.    Home Meds:  Medications Prior to Admission   Medication Sig Dispense Refill Last Dose   • albuterol sulfate  (90 Base) MCG/ACT inhaler Inhale 2 puffs Every 4 (Four) Hours As Needed.   Past Month at Unknown time   • ALPRAZolam (XANAX) 0.25 MG tablet   1 Past Month at Unknown time   • Aspirin Low Dose 81 MG EC tablet    1/12/2021 at Unknown time   • buPROPion XL (Wellbutrin XL) 150 MG 24 hr tablet Take 1 tablet by mouth Daily. 30 tablet 12 1/13/2021 at Unknown time   • calcitriol (ROCALTROL) 0.25 MCG capsule Take 0.25 mcg by mouth 2 (Two) Times a Day.   1/12/2021 at Unknown time   • calcium carbonate (OS-TIMOTEO) 600 MG tablet Take 600 mg by mouth 2 (Two)  Times a Day.   1/13/2021 at Unknown time   • clopidogrel (PLAVIX) 75 MG tablet    1/12/2021 at Unknown time   • cyclobenzaprine (FLEXERIL) 10 MG tablet    Past Week at Unknown time   • levothyroxine (SYNTHROID, LEVOTHROID) 150 MCG tablet Take 150 mcg by mouth Daily.   1/13/2021 at Unknown time   • predniSONE (DELTASONE) 20 MG tablet 5 mg.  0 1/13/2021 at Unknown time   • promethazine (PHENERGAN) 25 MG tablet Take 1 tablet by mouth Every 6 (Six) Hours As Needed for Nausea or Vomiting. 30 tablet 2 Past Month at Unknown time   • fluticasone-salmeterol (ADVAIR) 250-50 MCG/DOSE DISKUS Inhale 1 puff.          Medicines:  Current Facility-Administered Medications   Medication Dose Route Frequency Provider Last Rate Last Admin   • acetaminophen (TYLENOL) tablet 650 mg  650 mg Oral Q4H PRN Moises Peterson APRN        Or   • acetaminophen (TYLENOL) 160 MG/5ML solution 650 mg  650 mg Oral Q4H PRN Moises Peterson APRN        Or   • acetaminophen (TYLENOL) suppository 650 mg  650 mg Rectal Q4H PRN Moises Peterson APRN       • aspirin EC tablet 81 mg  81 mg Oral Daily Moises Peterson APRN   81 mg at 01/14/21 1147   • clopidogrel (PLAVIX) tablet 75 mg  75 mg Oral Daily Moises Peterson APRN   75 mg at 01/14/21 1146   • furosemide (LASIX) injection 40 mg  40 mg Intravenous Q12H Alfredo Loomis MD   40 mg at 01/14/21 0601   • HYDROcodone-acetaminophen (NORCO) 5-325 MG per tablet 1 tablet  1 tablet Oral Q4H PRN Moises Peterson APRN       • levothyroxine (SYNTHROID, LEVOTHROID) tablet 137 mcg  137 mcg Oral Q AM Moises Peterson APRN   137 mcg at 01/14/21 0601   • ondansetron (ZOFRAN) tablet 4 mg  4 mg Oral Q6H PRN Moises Peterson APRN        Or   • ondansetron (ZOFRAN) injection 4 mg  4 mg Intravenous Q6H PRN Moises Peterson APRN   4 mg at 01/14/21 0819   • predniSONE (DELTASONE) tablet 5 mg  5 mg Oral Daily With Breakfast Moises Peterson APRN   5 mg at 01/14/21 1146   • promethazine (PHENERGAN)  tablet 25 mg  25 mg Oral Q6H PRN Chas Navarro MD   25 mg at 01/14/21 1022   • sennosides-docusate (PERICOLACE) 8.6-50 MG per tablet 2 tablet  2 tablet Oral BID Moises Peterson APRN   2 tablet at 01/14/21 1147   • sodium chloride 0.9 % flush 10 mL  10 mL Intravenous Q12H Moises Peterson APRN   10 mL at 01/13/21 1452   • sodium chloride 0.9 % flush 10 mL  10 mL Intravenous PRN Moises Peterson APRN       • sodium chloride 0.9 % infusion  200 mL/hr Intravenous Continuous Alfredo Loomis  mL/hr at 01/14/21 0917 200 mL/hr at 01/14/21 0917       Past Medical History:  Past Medical History:   Diagnosis Date   • Abnormal Pap smear of cervix    • Cervical dysplasia    • Disease of thyroid gland    • Thyroid cancer (CMS/HCC)        Past Surgical History:  Past Surgical History:   Procedure Laterality Date   • HYSTERECTOMY      without BSO   • KNEE ARTHROPLASTY     • LUNG SURGERY Right 12/02/2020    Stent placed in right lung   • TOTAL THYROIDECTOMY     • WISDOM TOOTH EXTRACTION         Family History  Family History   Problem Relation Age of Onset   • Prostate cancer Father 72   • Arthritis Mother    • Arthritis Brother    • Colon cancer Paternal Uncle 50   • Thyroid cancer Other    • No Known Problems Sister    • No Known Problems Daughter    • No Known Problems Son    • No Known Problems Maternal Grandmother    • No Known Problems Paternal Grandmother    • No Known Problems Maternal Aunt    • No Known Problems Paternal Aunt    • Breast cancer Neg Hx    • Ovarian cancer Neg Hx    • Uterine cancer Neg Hx    • Melanoma Neg Hx    • BRCA 1/2 Neg Hx    • Endometrial cancer Neg Hx        Social History  Social History     Socioeconomic History   • Marital status:      Spouse name: Not on file   • Number of children: Not on file   • Years of education: Not on file   • Highest education level: Not on file   Tobacco Use   • Smoking status: Never Smoker   • Smokeless tobacco: Never Used    Substance and Sexual Activity   • Alcohol use: Yes     Comment: occ   • Drug use: No   • Sexual activity: Yes     Birth control/protection: Post-menopausal         Review of Systems:  History obtained from chart review and the patient  General ROS: No fever or chills  Respiratory ROS: No cough, shortness of breath, wheezing  Cardiovascular ROS: No chest pain or palpitations  Gastrointestinal ROS: No abdominal pain or melena  Genito-Urinary ROS: No dysuria or hematuria    Objective:  Patient Vitals for the past 24 hrs:   BP Temp Temp src Pulse Resp SpO2 Weight   01/14/21 1131 146/89 98.4 °F (36.9 °C) Oral 76 16 98 % --   01/14/21 0720 128/74 97.8 °F (36.6 °C) Oral 78 16 99 % --   01/14/21 0426 141/84 98.1 °F (36.7 °C) Oral 81 16 96 % --   01/13/21 2329 139/71 98 °F (36.7 °C) Oral 86 16 98 % --   01/13/21 1936 144/65 97.9 °F (36.6 °C) Oral 83 16 90 % --   01/13/21 1809 -- -- -- -- -- -- 84.3 kg (185 lb 14.4 oz)   01/13/21 1600 -- -- -- -- -- -- 85 kg (187 lb 8 oz)   01/13/21 1558 155/80 98.4 °F (36.9 °C) Oral 74 16 95 % --   01/13/21 1223 136/78 97.4 °F (36.3 °C) Oral 78 16 98 % --       Intake/Output Summary (Last 24 hours) at 1/14/2021 1152  Last data filed at 1/14/2021 1133  Gross per 24 hour   Intake 2240 ml   Output 2900 ml   Net -660 ml     General: awake/alert   Chest:  clear to auscultation bilaterally without respiratory distress  CVS: regular rate and rhythm  Abdominal: soft, nontender, positive bowel sounds  Extremities: no cyanosis or edema  Skin: warm and dry without rash      Labs:  Results from last 7 days   Lab Units 01/14/21  0451 01/13/21  1214 01/12/21  0701   WBC 10*3/mm3 11.00* 11.22* 10.56   HEMOGLOBIN g/dL 10.6* 12.2 11.2*   HEMATOCRIT % 32.4* 35.1 32.4*   PLATELETS 10*3/mm3 314 341 317         Results from last 7 days   Lab Units 01/14/21  0747 01/14/21  0451 01/13/21  2320  01/13/21  1214 01/12/21  0701   SODIUM mmol/L 142 140 139   < > 138 139   POTASSIUM mmol/L 3.3* 3.2* 3.5   < > 3.8 4.1    CHLORIDE mmol/L 103 102 100   < > 98 98   TOTAL CO2 mmol/L  --   --   --   --   --  31.0*   CO2 mmol/L 27.0 28.0 27.0   < > 29.0  --    BUN mg/dL 32* 32* 32*   < > 36* 39*   CREATININE mg/dL 3.52* 3.59* 3.75*   < > 3.78* 3.51*   CALCIUM mg/dL 12.0* 12.2* 13.2*   < > 15.7* 12.5*   BILIRUBIN mg/dL  --  0.2  --   --  0.2 0.2   ALK PHOS U/L  --  135*  --   --  177* 165*   ALT (SGPT) U/L  --  22  --   --  24 29   AST (SGOT) U/L  --  17  --   --  18 30   GLUCOSE mg/dL 90 117* 107*   < > 94  --     < > = values in this interval not displayed.       Radiology:   Imaging Results (Last 72 Hours)     Procedure Component Value Units Date/Time    CT CHEST WITHOUT CONTRAST DIAGNOSTIC [[id number]] Collected: 01/13/21 2026     Updated: 01/13/21 2045    Narrative:      EXAMINATION: CT CHEST WO CONTRAST- 1/13/2021 8:26 PM CST     HISTORY: Interstitial lung disease, history of sarcoidosis and thyroid  carcinoma. Retrocrural lymphadenopathy. Fibrosing mediastinitis.     DOSE: 191 mGycm (Automatic exposure control technique was implemented in  an effort to keep the radiation dose as low as possible without  compromising image quality)     REPORT: Spiral CT of the chest was performed without intravenous  contrast from the thoracic inlet through the upper abdomen.  Reconstructed coronal and sagittal images are also reviewed.     Comparison: CT abdomen pelvis with contrast 12/30/2020. Chest x-rays  2/19/2016.     Review of lung windows demonstrates mild respiratory motion artifact.  There is mild hyperinflation lungs with mild emphysematous changes.  There is coarse thickening of interlobular septa in the lung bases,  greater right than likely related to chronic interstitial fibrosis and  the history of sarcoidosis. There is associated pleural thickening in  the left lateral lung base, with adjacent coarse calcifications. No lung  consolidation is identified to indicate acute pneumonia. There are  tubular structures and both lungs  compatible with multiple dilated  pulmonary veins, which correspond with similar to prior structures on  the chest x-ray from 2016, gray similar in appearance. There is a stent  within the right middle lobe pulmonary vein. There are enlarged lymph  nodes with extensive calcifications at the bilateral hilar, subcarinal  and right paratracheal ivonne stations, compatible with chronic  sarcoidosis. Patency of the vascular structures is not determined  without intravenous contrast. The central pulmonary arteries are dilated  compatible pulmonary arterial hypertension. For example, the main  pulmonary artery measures 4.4 cm in diameter. The thoracic aorta is  normal caliber. There is mildly increased attenuation of fat planes  within the mediastinum or diffusely compatible with the history of  fibrosing mediastinitis. Heart size is normal. There is trace  pericardial fluid. In the upper abdomen, there are coarse calcifications  within the cortex of both kidneys, greater on the left and likely  related to renal involvement by sarcoidosis. There is a enlarged  noncalcified lymph node to the right of the esophagus in the posterior  mediastinum axial image 118, this lymph node measures 1.3 cm. There is  right retrocrural lymphadenopathy, with a maximum diameter 10.8 mm.  There are normal sized lymph nodes in the upper retroperitoneum. Review  of bone windows is unremarkable.       Impression:      1. Enlarged coarsely calcified mediastinal and hilar lymph nodes  compatible with the history of sarcoidosis. There is also interstitial  thickening in the lungs which appears to be related to chronic fibrosis  related to sarcoid disease. No superimposed acute infiltrate or lung  consolidation is identified.  2. Dilated pulmonary arteries compatible with pulmonary arterial  hypertension. There is also evidence of chronic pulmonary venous  hypertension, with multiple dilated tortuous pulmonary veins within the  lungs bilaterally, and  there is evidence of previous stent insertion in  one of the main right pulmonary veins inferior to the right hilum.  Increased attenuation and stranding of mediastinal fat planes is  compatible with the history of fibrosing mediastinitis.  3. There is a small noncalcified lymph node in the posterior mediastinum  measuring 10 mm and a noncalcified 10 mm lymph node in the right  retrocrural space, compatible with mild retrocrural lymphadenopathy.  This may also be related to sarcoid disease.  4. Coarse calcifications within the kidneys bilaterally is most likely a  manifestation of renal sarcoidosis. There is no evidence of  hydronephrosis.              This report was finalized on 01/13/2021 20:42 by Dr. Chas Best MD.    US Renal Bilateral [[id number]] Collected: 01/13/21 1405     Updated: 01/13/21 1414    Narrative:      EXAM: US RENAL BILATERAL- - 1/13/2021 1:13 PM CST     HISTORY: acute kidney failure       COMPARISON: 12/30/2020.      TECHNIQUE: Real time grayscale and color ultrasound performed with  representative images saved to PACS.      FINDINGS:  AORTA. Normal caliber by ultrasound.      RIGHT KIDNEY. Measures 10.7 x 5.0 x 5.9 cm. Normal renal contour. There  are chunky shadowing echogenic foci involving the renal parenchyma. No  hydronephrosis.     LEFT KIDNEY. Measures 10.6 x 6.3 x 4.8 cm. Normal renal contour. There  are coarse chunky calcification. No hydronephrosis.     BLADDER. Under distended urinary bladder appears grossly normal.          Impression:      1. No hydronephrosis.  2. Redemonstration of bilateral renal parenchymal calcifications,  suggestive of nephrocalcinosis, which has a broad differential.  This report was finalized on 01/13/2021 14:11 by Dr Mounika Goodson MD.          Culture:  Urine Culture   Date Value Ref Range Status   01/11/2021 Final report  Final         Assessment   1.  Acute kidney injury, ATN--some improvement  2.  Severe hypercalcemia--improving. Zometa given  1/13  3.  Rule out lymphoproliferative disorder  4.  Sarcoidosis  5.  History of thyroid CA    Plan:  1.  Continue aggressive IV fluids  2.  Continue calcitonin  3.  SPEP and PTH-rp results pending  4.  Monitor labs      Jose Elias Holliday, APRN  1/14/2021  11:52 CST

## 2021-01-14 NOTE — PROGRESS NOTES
[facility] Progress Note    Progress Note    Patient:  Ines Rowland  YOB: 1970  Date of Service: 1/14/2021  MRN: [id number]   Acct: [id number]   Primary Care Physician: Chas Navarro MD  Advance Directive:   Code Status and Medical Interventions:   Ordered at: 01/13/21 1201     Level Of Support Discussed With:    Patient     Code Status:    CPR     Medical Interventions (Level of Support Prior to Arrest):    Full     Admit Date: 1/13/2021       Hospital Day: 1      Subjective:     Chief Compliant: None.  Seen with nurse Adele at the bedside.  Stable overnight.      Review of Systems:   Review of Systems   Constitutional: Negative for chills and fever.   HENT: Negative for congestion and trouble swallowing.    Eyes: Negative for redness and visual disturbance.   Respiratory: Negative for cough and shortness of breath.    Cardiovascular: Negative for chest pain and leg swelling.   Gastrointestinal: Negative for vomiting.        She had nausea.   Endocrine: Negative for cold intolerance and heat intolerance.   Genitourinary: Negative for difficulty urinating and flank pain.   Musculoskeletal: Negative for gait problem and joint swelling.   Skin: Positive for pallor.   Neurological: Negative for speech difficulty and weakness.   Hematological: Does not bruise/bleed easily.   Psychiatric/Behavioral: Negative for agitation, confusion and hallucinations.           Medications:   Scheduled Meds:aspirin, 81 mg, Oral, Daily  clopidogrel, 75 mg, Oral, Daily  furosemide, 40 mg, Intravenous, Q12H  levothyroxine, 137 mcg, Oral, Q AM  predniSONE, 5 mg, Oral, Daily With Breakfast  senna-docusate sodium, 2 tablet, Oral, BID  sodium chloride, 10 mL, Intravenous, Q12H        Continuous Infusions:sodium chloride, 200 mL/hr, Last Rate: 200 mL/hr (01/14/21 0322)        Labs:     Lab Results (last 72 hours)     Procedure Component Value Units Date/Time    Comprehensive Metabolic Panel [[id number]]   (Abnormal) Collected: 01/14/21 0451    Specimen: Blood Updated: 01/14/21 0551     Glucose 117 mg/dL      BUN 32 mg/dL      Creatinine 3.59 mg/dL      Sodium 140 mmol/L      Potassium 3.2 mmol/L      Chloride 102 mmol/L      CO2 28.0 mmol/L      Calcium 12.2 mg/dL      Total Protein 7.5 g/dL      Albumin 3.80 g/dL      ALT (SGPT) 22 U/L      AST (SGOT) 17 U/L      Alkaline Phosphatase 135 U/L      Total Bilirubin 0.2 mg/dL      eGFR Non African Amer 13 mL/min/1.73      Comment: <15 Indicative of kidney failure.        eGFR   Amer --     Comment: <15 Indicative of kidney failure.        Globulin 3.7 gm/dL      A/G Ratio 1.0 g/dL      BUN/Creatinine Ratio 8.9     Anion Gap 10.0 mmol/L     Narrative:      GFR Normal >60  Chronic Kidney Disease <60  Kidney Failure <15      Uric Acid [[id number]]  (Normal) Collected: 01/14/21 0451    Specimen: Blood Updated: 01/14/21 0551     Uric Acid 5.3 mg/dL     CBC Auto Differential [[id number]]  (Abnormal) Collected: 01/14/21 0451    Specimen: Blood Updated: 01/14/21 0524     WBC 11.00 10*3/mm3      RBC 3.60 10*6/mm3      Hemoglobin 10.6 g/dL      Hematocrit 32.4 %      MCV 90.0 fL      MCH 29.4 pg      MCHC 32.7 g/dL      RDW 12.3 %      RDW-SD 40.0 fl      MPV 9.8 fL      Platelets 314 10*3/mm3      Neutrophil % 63.8 %      Lymphocyte % 18.8 %      Monocyte % 13.8 %      Eosinophil % 2.1 %      Basophil % 0.7 %      Immature Grans % 0.8 %      Neutrophils, Absolute 7.01 10*3/mm3      Lymphocytes, Absolute 2.07 10*3/mm3      Monocytes, Absolute 1.52 10*3/mm3      Eosinophils, Absolute 0.23 10*3/mm3      Basophils, Absolute 0.08 10*3/mm3      Immature Grans, Absolute 0.09 10*3/mm3      nRBC 0.0 /100 WBC     Vitamin D 25 Hydroxy [[id number]] Collected: 01/14/21 0451    Specimen: Blood Updated: 01/14/21 0511    Eosinophil Smear - Urine, Urine, Clean Catch [[id number]]  (Normal) Collected: 01/13/21 1457    Specimen: Urine, Clean Catch Updated: 01/14/21 0129     Eosinophil Smear 0 %  EOS/100 Cells     Creatinine, Urine, Random - Urine, Clean Catch [[id number]] Collected: 01/13/21 1457    Specimen: Urine, Clean Catch Updated: 01/14/21 0108     Creatinine, Urine 36.5 mg/dL     Narrative:      Reference intervals for random urine have not been established.  Clinical usage is dependent upon physician's interpretation in combination with other laboratory tests.       Basic Metabolic Panel [[id number]]  (Abnormal) Collected: 01/13/21 2320    Specimen: Blood Updated: 01/14/21 0036     Glucose 107 mg/dL      BUN 32 mg/dL      Creatinine 3.75 mg/dL      Sodium 139 mmol/L      Potassium 3.5 mmol/L      Chloride 100 mmol/L      CO2 27.0 mmol/L      Calcium 13.2 mg/dL      eGFR   Amer --     Comment: <15 Indicative of kidney failure.        eGFR Non African Amer 13 mL/min/1.73      Comment: <15 Indicative of kidney failure.        BUN/Creatinine Ratio 8.5     Anion Gap 12.0 mmol/L     Narrative:      GFR Normal >60  Chronic Kidney Disease <60  Kidney Failure <15      Angiotensin Converting Enzyme [[id number]] Collected: 01/13/21 2320    Specimen: Blood Updated: 01/14/21 0013    Vitamin D 25 Hydroxy [[id number]]  (Normal) Collected: 01/13/21 1214    Specimen: Blood Updated: 01/13/21 2000     25 Hydroxy, Vitamin D 49.4 ng/ml     Narrative:      Reference Range for Total Vitamin D 25(OH)     Deficiency <20.0 ng/mL   Insufficiency 21-29 ng/mL   Sufficiency  ng/mL  Toxicity >100 ng/ml    Results may be falsely increased if patient taking Biotin.      Vitamin B12 [[id number]]  (Normal) Collected: 01/13/21 1214    Specimen: Blood Updated: 01/13/21 1953     Vitamin B-12 463 pg/mL     Narrative:      Results may be falsely increased if patient taking Biotin.      Basic Metabolic Panel [[id number]]  (Abnormal) Collected: 01/13/21 1617    Specimen: Blood Updated: 01/13/21 1658     Glucose 86 mg/dL      BUN 36 mg/dL      Creatinine 3.88 mg/dL      Sodium 140 mmol/L      Potassium 3.9 mmol/L      Chloride 101  mmol/L      CO2 28.0 mmol/L      Calcium 14.9 mg/dL      eGFR   Amer --     Comment: <15 Indicative of kidney failure.        eGFR Non African Amer 12 mL/min/1.73      Comment: <15 Indicative of kidney failure.        BUN/Creatinine Ratio 9.3     Anion Gap 11.0 mmol/L     Narrative:      GFR Normal >60  Chronic Kidney Disease <60  Kidney Failure <15      Protein Elec + Interp, Serum [[id number]] Collected: 01/13/21 1617    Specimen: Blood Updated: 01/13/21 1630    PTH-related Peptide [[id number]] Collected: 01/13/21 1617    Specimen: Blood Updated: 01/13/21 1630    Osmolality, Urine - Urine, Clean Catch [[id number]]  (Abnormal) Collected: 01/13/21 1457    Specimen: Urine, Clean Catch Updated: 01/13/21 1601     Osmolality, Urine 243 mOsm/kg     Sodium, Urine, Random - Urine, Clean Catch [[id number]] Collected: 01/13/21 1457    Specimen: Urine, Clean Catch Updated: 01/13/21 1550     Sodium, Urine 71 mmol/L     Narrative:      Reference intervals for random urine have not been established.  Clinical usage is dependent upon physician's interpretation in combination with other laboratory tests.       Protein, Urine, Random - Urine, Clean Catch [[id number]] Collected: 01/13/21 1457    Specimen: Urine, Clean Catch Updated: 01/13/21 1549     Total Protein, Urine 13.8 mg/dL     Narrative:      Reference intervals for random urine have not been established.  Clinical usage is dependent upon physician's interpretation in combination with other laboratory tests.       Urinalysis With Microscopic If Indicated (No Culture) - Urine, Clean Catch [[id number]]  (Abnormal) Collected: 01/13/21 1457    Specimen: Urine, Clean Catch Updated: 01/13/21 1534     Color, UA Yellow     Appearance, UA Clear     pH, UA 8.0     Specific Gravity, UA 1.008     Glucose, UA Negative     Ketones, UA Negative     Bilirubin, UA Negative     Blood, UA Negative     Protein, UA Trace     Leuk Esterase, UA Trace     Nitrite, UA Negative     Urobilinogen, UA  0.2 E.U./dL    Urinalysis, Microscopic Only - Urine, Clean Catch [[id number]]  (Abnormal) Collected: 01/13/21 1457    Specimen: Urine, Clean Catch Updated: 01/13/21 1534     RBC, UA 0-2 /HPF      WBC, UA 3-5 /HPF      Bacteria, UA None Seen /HPF      Squamous Epithelial Cells, UA 3-6 /HPF      Hyaline Casts, UA 0-2 /LPF      Methodology Automated Microscopy    Calcium, Ionized [[id number]]  (Abnormal) Collected: 01/13/21 1435    Specimen: Blood Updated: 01/13/21 1454     Ionized Calcium >7.21 mg/dL      Comment: 93 Value above reportable range > 7.21        Notified Who [id number]     Notified By [id number]     Notified Time 01/13/2021 14:55     Collected by [id number]     Comment: Meter: [id number]     :  [id number]       PTH, Intact [[id number]]  (Abnormal) Collected: 01/13/21 1214    Specimen: Blood Updated: 01/13/21 1308     PTH, Intact 1.4 pg/mL     Narrative:      Results may be falsely decreased if patient taking Biotin.      Comprehensive Metabolic Panel [[id number]]  (Abnormal) Collected: 01/13/21 1214    Specimen: Blood Updated: 01/13/21 1257     Glucose 94 mg/dL      BUN 36 mg/dL      Creatinine 3.78 mg/dL      Sodium 138 mmol/L      Potassium 3.8 mmol/L      Chloride 98 mmol/L      CO2 29.0 mmol/L      Calcium 15.7 mg/dL      Total Protein 9.0 g/dL      Albumin 4.50 g/dL      ALT (SGPT) 24 U/L      AST (SGOT) 18 U/L      Alkaline Phosphatase 177 U/L      Total Bilirubin 0.2 mg/dL      eGFR Non African Amer 13 mL/min/1.73      Comment: <15 Indicative of kidney failure.        eGFR   Amer --     Comment: <15 Indicative of kidney failure.        Globulin 4.5 gm/dL      A/G Ratio 1.0 g/dL      BUN/Creatinine Ratio 9.5     Anion Gap 11.0 mmol/L     Narrative:      GFR Normal >60  Chronic Kidney Disease <60  Kidney Failure <15      COVID-19, [facility] IN-HOUSE,NASAL Swab (NO TRANSPORT MEDIA) 2 HR TAT - Swab, Nasopharynx [[id number]]  (Normal) Collected: 01/13/21 1218    Specimen: Swab from Nasopharynx Updated:  01/13/21 1250     COVID19 Presumptive Negative    Narrative:      Fact sheet for providers: https://www.fda.gov/media/714942/download     Fact sheet for patients: https://www.fda.gov/media/959843/download    Test performed by PCR.  If inconsistent with clinical signs and symptoms patient should be tested with different authorized molecular test.    Ferritin [[id number]]  (Abnormal) Collected: 01/13/21 1214    Specimen: Blood Updated: 01/13/21 1248     Ferritin 271.70 ng/mL     Narrative:      Results may be falsely decreased if patient taking Biotin.      Iron Profile [[id number]]  (Abnormal) Collected: 01/13/21 1214    Specimen: Blood Updated: 01/13/21 1243     Iron 59 mcg/dL      Iron Saturation 19 %      Transferrin 213 mg/dL      TIBC 317 mcg/dL     Phosphorus [[id number]]  (Normal) Collected: 01/13/21 1214    Specimen: Blood Updated: 01/13/21 1240     Phosphorus 3.3 mg/dL     Magnesium [[id number]]  (Normal) Collected: 01/13/21 1214    Specimen: Blood Updated: 01/13/21 1238     Magnesium 2.2 mg/dL     CBC Auto Differential [[id number]]  (Abnormal) Collected: 01/13/21 1214    Specimen: Blood Updated: 01/13/21 1221     WBC 11.22 10*3/mm3      RBC 3.97 10*6/mm3      Hemoglobin 12.2 g/dL      Hematocrit 35.1 %      MCV 88.4 fL      MCH 30.7 pg      MCHC 34.8 g/dL      RDW 12.4 %      RDW-SD 39.8 fl      MPV 9.5 fL      Platelets 341 10*3/mm3      Neutrophil % 73.9 %      Lymphocyte % 14.4 %      Monocyte % 9.1 %      Eosinophil % 1.0 %      Basophil % 0.7 %      Immature Grans % 0.9 %      Neutrophils, Absolute 8.29 10*3/mm3      Lymphocytes, Absolute 1.62 10*3/mm3      Monocytes, Absolute 1.02 10*3/mm3      Eosinophils, Absolute 0.11 10*3/mm3      Basophils, Absolute 0.08 10*3/mm3      Immature Grans, Absolute 0.10 10*3/mm3      nRBC 0.0 /100 WBC             Radiology:     Imaging Results (Last 72 Hours)     Procedure Component Value Units Date/Time    CT CHEST WITHOUT CONTRAST DIAGNOSTIC [[id number]] Collected:  01/13/21 2026     Updated: 01/13/21 2045    Narrative:      EXAMINATION: CT CHEST WO CONTRAST- 1/13/2021 8:26 PM CST     HISTORY: Interstitial lung disease, history of sarcoidosis and thyroid  carcinoma. Retrocrural lymphadenopathy. Fibrosing mediastinitis.     DOSE: 191 mGycm (Automatic exposure control technique was implemented in  an effort to keep the radiation dose as low as possible without  compromising image quality)     REPORT: Spiral CT of the chest was performed without intravenous  contrast from the thoracic inlet through the upper abdomen.  Reconstructed coronal and sagittal images are also reviewed.     Comparison: CT abdomen pelvis with contrast 12/30/2020. Chest x-rays  2/19/2016.     Review of lung windows demonstrates mild respiratory motion artifact.  There is mild hyperinflation lungs with mild emphysematous changes.  There is coarse thickening of interlobular septa in the lung bases,  greater right than likely related to chronic interstitial fibrosis and  the history of sarcoidosis. There is associated pleural thickening in  the left lateral lung base, with adjacent coarse calcifications. No lung  consolidation is identified to indicate acute pneumonia. There are  tubular structures and both lungs compatible with multiple dilated  pulmonary veins, which correspond with similar to prior structures on  the chest x-ray from 2016, gray similar in appearance. There is a stent  within the right middle lobe pulmonary vein. There are enlarged lymph  nodes with extensive calcifications at the bilateral hilar, subcarinal  and right paratracheal ivonne stations, compatible with chronic  sarcoidosis. Patency of the vascular structures is not determined  without intravenous contrast. The central pulmonary arteries are dilated  compatible pulmonary arterial hypertension. For example, the main  pulmonary artery measures 4.4 cm in diameter. The thoracic aorta is  normal caliber. There is mildly increased  attenuation of fat planes  within the mediastinum or diffusely compatible with the history of  fibrosing mediastinitis. Heart size is normal. There is trace  pericardial fluid. In the upper abdomen, there are coarse calcifications  within the cortex of both kidneys, greater on the left and likely  related to renal involvement by sarcoidosis. There is a enlarged  noncalcified lymph node to the right of the esophagus in the posterior  mediastinum axial image 118, this lymph node measures 1.3 cm. There is  right retrocrural lymphadenopathy, with a maximum diameter 10.8 mm.  There are normal sized lymph nodes in the upper retroperitoneum. Review  of bone windows is unremarkable.       Impression:      1. Enlarged coarsely calcified mediastinal and hilar lymph nodes  compatible with the history of sarcoidosis. There is also interstitial  thickening in the lungs which appears to be related to chronic fibrosis  related to sarcoid disease. No superimposed acute infiltrate or lung  consolidation is identified.  2. Dilated pulmonary arteries compatible with pulmonary arterial  hypertension. There is also evidence of chronic pulmonary venous  hypertension, with multiple dilated tortuous pulmonary veins within the  lungs bilaterally, and there is evidence of previous stent insertion in  one of the main right pulmonary veins inferior to the right hilum.  Increased attenuation and stranding of mediastinal fat planes is  compatible with the history of fibrosing mediastinitis.  3. There is a small noncalcified lymph node in the posterior mediastinum  measuring 10 mm and a noncalcified 10 mm lymph node in the right  retrocrural space, compatible with mild retrocrural lymphadenopathy.  This may also be related to sarcoid disease.  4. Coarse calcifications within the kidneys bilaterally is most likely a  manifestation of renal sarcoidosis. There is no evidence of  hydronephrosis.              This report was finalized on 01/13/2021  20:42 by Dr. Chas Best MD.    US Renal Bilateral [[id number]] Collected: 01/13/21 1405     Updated: 01/13/21 1414    Narrative:      EXAM: US RENAL BILATERAL- - 1/13/2021 1:13 PM CST     HISTORY: acute kidney failure       COMPARISON: 12/30/2020.      TECHNIQUE: Real time grayscale and color ultrasound performed with  representative images saved to PACS.      FINDINGS:  AORTA. Normal caliber by ultrasound.      RIGHT KIDNEY. Measures 10.7 x 5.0 x 5.9 cm. Normal renal contour. There  are chunky shadowing echogenic foci involving the renal parenchyma. No  hydronephrosis.     LEFT KIDNEY. Measures 10.6 x 6.3 x 4.8 cm. Normal renal contour. There  are coarse chunky calcification. No hydronephrosis.     BLADDER. Under distended urinary bladder appears grossly normal.          Impression:      1. No hydronephrosis.  2. Redemonstration of bilateral renal parenchymal calcifications,  suggestive of nephrocalcinosis, which has a broad differential.  This report was finalized on 01/13/2021 14:11 by Dr Mounika Goodson MD.            Objective:   Vitals: /84 (BP Location: Right arm, Patient Position: Lying)   Pulse 81   Temp 98.1 °F (36.7 °C) (Oral)   Resp 16   Wt 84.3 kg (185 lb 14.4 oz)   LMP 10/12/2005 (Approximate) Comment: Pelvic pain  SpO2 96%   BMI 30.02 kg/m²   Physical Exam  Vitals signs reviewed.   Constitutional:       General: She is not in acute distress.     Appearance: She is not ill-appearing.   HENT:      Head: Normocephalic and atraumatic.   Eyes:      General: No scleral icterus.  Neck:      Musculoskeletal: Neck supple.   Cardiovascular:      Rate and Rhythm: Normal rate and regular rhythm.   Pulmonary:      Effort: No respiratory distress.      Breath sounds: No wheezing or rales.   Abdominal:      General: Bowel sounds are normal.      Palpations: Abdomen is soft.      Tenderness: There is no abdominal tenderness.   Musculoskeletal:         General: No swelling.   Skin:     General: Skin  is warm and dry.      Coloration: Skin is pale.   Neurological:      Mental Status: She is alert and oriented to person, place, and time.   Psychiatric:         Mood and Affect: Mood normal.         Behavior: Behavior normal.         Thought Content: Thought content normal.         Judgment: Judgment normal.       24HR INTAKE/OUTPUT:      Intake/Output Summary (Last 24 hours) at 1/14/2021 0619  Last data filed at 1/14/2021 0321  Gross per 24 hour   Intake 2000 ml   Output 300 ml   Net 1700 ml        Problem list:       RUSSELL (acute kidney injury) (CMS/[facility])      Assessment/Plan:     ASSESSMENT:   1.  Lymphadenopathy, left cardiac and right retrocrural, from sarcoidosis.  2.  Hypercalcemia, improving, likely from sarcoidosis.   3.  Sarcoidosis.  4.  Leukocytosis likely reactive process.  5.  Acute kidney injury.  6.  Normocytic anemia likely from chronic disease.  7.  History of T1b, N0, follicular thyroid cancer post right hemithyroidectomy by Dr. Luu on 02/16/2016.        PLAN:  1.   Re: CT chest 01/13/2021.  Enlarged coarsely calcified mediastinal and hilar lymph nodes  compatible with the history of sarcoidosis. There is also interstitial thickening in the lungs which appears to be related to chronic fibrosis related to sarcoid disease. No superimposed acute infiltrate or lung consolidation is identified.  Dilated pulmonary arteries compatible with pulmonary arterial  hypertension. There is also evidence of chronic pulmonary venous hypertension, with multiple dilated tortuous pulmonary veins within the lungs bilaterally, and there is evidence of previous stent insertion in one of the main right pulmonary veins inferior to the right hilum. Increased attenuation and stranding of mediastinal fat planes is compatible with the history of fibrosing mediastinitis. There is a small noncalcified lymph node in the posterior mediastinum measuring 10 mm and a noncalcified 10 mm lymph node in the right retrocrural  space, compatible with mild retrocrural lymphadenopathy.  This may also be related to sarcoid disease.  Coarse calcifications within the kidneys bilaterally is most likely a manifestation of renal sarcoidosis. There is no evidence of hydronephrosis.  2.   Re: Heme status, WBC 11, hemoglobin 10.6, and platelet 340.  GFR 13 ml/min and calcium 12.2.  3.  On calcitonin 100 units subcutaneous every 12 hours and Zometa given 01/13/2021.  4.  Await serum protein electrophoresis and PTH related protein.  5.  No node biopsy plan at this point.   Check anemia profile.  6.  Further recommendations pending.  7.  Above discussed with patient and nurse Adele at the bedside.  They verbalized understanding.

## 2021-01-14 NOTE — PROGRESS NOTES
Discharge Planning Assessment  [facility]     Patient Name: Ines Rowland  MRN: [id number]  Today's Date: 1/14/2021    Admit Date: 1/13/2021    Discharge Needs Assessment     Row Name 01/14/21 1019       Living Environment    Lives With  spouse    Name(s) of Who Lives With Patient  Marcello    Current Living Arrangements  home/apartment/condo    Primary Care Provided by  self    Provides Primary Care For  no one    Family Caregiver if Needed  spouse    Quality of Family Relationships  helpful;involved;supportive       Resource/Environmental Concerns    Resource/Environmental Concerns  none       Transition Planning    Patient/Family Anticipates Transition to  home with family    Patient/Family Anticipated Services at Transition  none    Transportation Anticipated  family or friend will provide       Discharge Needs Assessment    Readmission Within the Last 30 Days  no previous admission in last 30 days    Equipment Currently Used at Home  none    Concerns to be Addressed  no discharge needs identified    Anticipated Changes Related to Illness  none    Equipment Needed After Discharge  none    Current Discharge Risk  chronically ill    Discharge Coordination/Progress  Pt has PCP and RX coverage.  Pt can afford medications.  Pt denies any dc needs and does not want  services.        Discharge Plan    No documentation.       Continued Care and Services - Admitted Since 1/13/2021    Coordination has not been started for this encounter.         Demographic Summary    No documentation.       Functional Status    No documentation.       Psychosocial    No documentation.       Abuse/Neglect    No documentation.       Legal    No documentation.       Substance Abuse    No documentation.       Patient Forms    No documentation.           YOLANDE PlummerW

## 2021-01-15 PROBLEM — E83.52 HYPERCALCEMIA: Status: ACTIVE | Noted: 2021-01-15

## 2021-01-15 LAB
ACE SERPL-CCNC: 28 U/L (ref 14–82)
ALBUMIN SERPL ELPH-MCNC: 3.5 G/DL (ref 2.9–4.4)
ALBUMIN SERPL-MCNC: 3.9 G/DL (ref 3.5–5.2)
ALBUMIN/GLOB SERPL: 0.9 {RATIO} (ref 0.7–1.7)
ALBUMIN/GLOB SERPL: 1.1 G/DL
ALP SERPL-CCNC: 131 U/L (ref 39–117)
ALPHA1 GLOB SERPL ELPH-MCNC: 0.3 G/DL (ref 0–0.4)
ALPHA2 GLOB SERPL ELPH-MCNC: 1 G/DL (ref 0.4–1)
ALT SERPL W P-5'-P-CCNC: 15 U/L (ref 1–33)
ANION GAP SERPL CALCULATED.3IONS-SCNC: 12 MMOL/L (ref 5–15)
ANION GAP SERPL CALCULATED.3IONS-SCNC: 13 MMOL/L (ref 5–15)
AST SERPL-CCNC: 15 U/L (ref 1–32)
B-GLOBULIN SERPL ELPH-MCNC: 1.1 G/DL (ref 0.7–1.3)
BASOPHILS # BLD AUTO: 0.08 10*3/MM3 (ref 0–0.2)
BASOPHILS NFR BLD AUTO: 0.8 % (ref 0–1.5)
BILIRUB SERPL-MCNC: 0.2 MG/DL (ref 0–1.2)
BUN SERPL-MCNC: 22 MG/DL (ref 6–20)
BUN SERPL-MCNC: 23 MG/DL (ref 6–20)
BUN/CREAT SERPL: 7.1 (ref 7–25)
BUN/CREAT SERPL: 7.7 (ref 7–25)
CALCIUM SPEC-SCNC: 9.6 MG/DL (ref 8.6–10.5)
CALCIUM SPEC-SCNC: 9.7 MG/DL (ref 8.6–10.5)
CHLORIDE SERPL-SCNC: 102 MMOL/L (ref 98–107)
CHLORIDE SERPL-SCNC: 104 MMOL/L (ref 98–107)
CO2 SERPL-SCNC: 25 MMOL/L (ref 22–29)
CO2 SERPL-SCNC: 26 MMOL/L (ref 22–29)
CREAT SERPL-MCNC: 3 MG/DL (ref 0.57–1)
CREAT SERPL-MCNC: 3.1 MG/DL (ref 0.57–1)
DEPRECATED RDW RBC AUTO: 39.9 FL (ref 37–54)
EOSINOPHIL # BLD AUTO: 0.21 10*3/MM3 (ref 0–0.4)
EOSINOPHIL NFR BLD AUTO: 2.1 % (ref 0.3–6.2)
ERYTHROCYTE [DISTWIDTH] IN BLOOD BY AUTOMATED COUNT: 12.4 % (ref 12.3–15.4)
GAMMA GLOB SERPL ELPH-MCNC: 1.3 G/DL (ref 0.4–1.8)
GFR SERPL CREATININE-BSD FRML MDRD: 16 ML/MIN/1.73
GFR SERPL CREATININE-BSD FRML MDRD: 17 ML/MIN/1.73
GLOBULIN SER CALC-MCNC: 3.8 G/DL (ref 2.2–3.9)
GLOBULIN UR ELPH-MCNC: 3.5 GM/DL
GLUCOSE SERPL-MCNC: 73 MG/DL (ref 65–99)
GLUCOSE SERPL-MCNC: 88 MG/DL (ref 65–99)
HCT VFR BLD AUTO: 32.2 % (ref 34–46.6)
HGB BLD-MCNC: 11.1 G/DL (ref 12–15.9)
IMM GRANULOCYTES # BLD AUTO: 0.08 10*3/MM3 (ref 0–0.05)
IMM GRANULOCYTES NFR BLD AUTO: 0.8 % (ref 0–0.5)
LABORATORY COMMENT REPORT: NORMAL
LYMPHOCYTES # BLD AUTO: 1.43 10*3/MM3 (ref 0.7–3.1)
LYMPHOCYTES NFR BLD AUTO: 14 % (ref 19.6–45.3)
M PROTEIN SERPL ELPH-MCNC: NORMAL G/DL
MAGNESIUM SERPL-MCNC: 1.6 MG/DL (ref 1.6–2.6)
MCH RBC QN AUTO: 30.3 PG (ref 26.6–33)
MCHC RBC AUTO-ENTMCNC: 34.5 G/DL (ref 31.5–35.7)
MCV RBC AUTO: 88 FL (ref 79–97)
MONOCYTES # BLD AUTO: 1.47 10*3/MM3 (ref 0.1–0.9)
MONOCYTES NFR BLD AUTO: 14.4 % (ref 5–12)
NEUTROPHILS NFR BLD AUTO: 6.92 10*3/MM3 (ref 1.7–7)
NEUTROPHILS NFR BLD AUTO: 67.9 % (ref 42.7–76)
NRBC BLD AUTO-RTO: 0 /100 WBC (ref 0–0.2)
PLATELET # BLD AUTO: 272 10*3/MM3 (ref 140–450)
PMV BLD AUTO: 9.6 FL (ref 6–12)
POTASSIUM SERPL-SCNC: 2.8 MMOL/L (ref 3.5–5.2)
POTASSIUM SERPL-SCNC: 3.2 MMOL/L (ref 3.5–5.2)
PROT PATTERN SERPL ELPH-IMP: NORMAL
PROT SERPL-MCNC: 7.3 G/DL (ref 6–8.5)
PROT SERPL-MCNC: 7.4 G/DL (ref 6–8.5)
RBC # BLD AUTO: 3.66 10*6/MM3 (ref 3.77–5.28)
SODIUM SERPL-SCNC: 141 MMOL/L (ref 136–145)
SODIUM SERPL-SCNC: 141 MMOL/L (ref 136–145)
T4 FREE SERPL-MCNC: 1.6 NG/DL (ref 0.93–1.7)
TSH SERPL DL<=0.05 MIU/L-ACNC: 1.73 UIU/ML (ref 0.27–4.2)
WBC # BLD AUTO: 10.19 10*3/MM3 (ref 3.4–10.8)

## 2021-01-15 PROCEDURE — 84443 ASSAY THYROID STIM HORMONE: CPT | Performed by: FAMILY MEDICINE

## 2021-01-15 PROCEDURE — 80048 BASIC METABOLIC PNL TOTAL CA: CPT | Performed by: FAMILY MEDICINE

## 2021-01-15 PROCEDURE — 83735 ASSAY OF MAGNESIUM: CPT | Performed by: NURSE PRACTITIONER

## 2021-01-15 PROCEDURE — 25010000002 FUROSEMIDE PER 20 MG: Performed by: INTERNAL MEDICINE

## 2021-01-15 PROCEDURE — 99232 SBSQ HOSP IP/OBS MODERATE 35: CPT | Performed by: INTERNAL MEDICINE

## 2021-01-15 PROCEDURE — 84439 ASSAY OF FREE THYROXINE: CPT | Performed by: FAMILY MEDICINE

## 2021-01-15 PROCEDURE — 80053 COMPREHEN METABOLIC PANEL: CPT | Performed by: FAMILY MEDICINE

## 2021-01-15 PROCEDURE — 85025 COMPLETE CBC W/AUTO DIFF WBC: CPT | Performed by: FAMILY MEDICINE

## 2021-01-15 PROCEDURE — 63710000001 PREDNISONE PER 5 MG: Performed by: NURSE PRACTITIONER

## 2021-01-15 RX ORDER — FUROSEMIDE 10 MG/ML
40 INJECTION INTRAMUSCULAR; INTRAVENOUS DAILY
Status: DISCONTINUED | OUTPATIENT
Start: 2021-01-16 | End: 2021-01-15

## 2021-01-15 RX ORDER — POTASSIUM CHLORIDE 750 MG/1
40 CAPSULE, EXTENDED RELEASE ORAL ONCE
Status: COMPLETED | OUTPATIENT
Start: 2021-01-15 | End: 2021-01-15

## 2021-01-15 RX ADMIN — SODIUM CHLORIDE 150 ML/HR: 9 INJECTION, SOLUTION INTRAVENOUS at 04:36

## 2021-01-15 RX ADMIN — FUROSEMIDE 40 MG: 10 INJECTION, SOLUTION INTRAVENOUS at 06:14

## 2021-01-15 RX ADMIN — SODIUM CHLORIDE 75 ML/HR: 9 INJECTION, SOLUTION INTRAVENOUS at 15:20

## 2021-01-15 RX ADMIN — DOCUSATE SODIUM 50 MG AND SENNOSIDES 8.6 MG 2 TABLET: 8.6; 5 TABLET, FILM COATED ORAL at 08:39

## 2021-01-15 RX ADMIN — LEVOTHYROXINE SODIUM 137 MCG: 112 TABLET ORAL at 05:58

## 2021-01-15 RX ADMIN — POTASSIUM CHLORIDE 40 MEQ: 750 CAPSULE, EXTENDED RELEASE ORAL at 06:34

## 2021-01-15 RX ADMIN — PREDNISONE 5 MG: 5 TABLET ORAL at 08:39

## 2021-01-15 RX ADMIN — SODIUM CHLORIDE, PRESERVATIVE FREE 10 ML: 5 INJECTION INTRAVENOUS at 22:08

## 2021-01-15 RX ADMIN — CLOPIDOGREL 75 MG: 75 TABLET, FILM COATED ORAL at 08:39

## 2021-01-15 RX ADMIN — ASPIRIN 81 MG: 81 TABLET, FILM COATED ORAL at 08:39

## 2021-01-15 NOTE — PROGRESS NOTES
[facility] Progress Note    Progress Note    Patient:  Ines Rowland  YOB: 1970  Date of Service: 1/15/2021  MRN: [id number]   Acct: [id number]   Primary Care Physician: Chas Navarro MD  Advance Directive:   Code Status and Medical Interventions:   Ordered at: 01/13/21 1201     Level Of Support Discussed With:    Patient     Code Status:    CPR     Medical Interventions (Level of Support Prior to Arrest):    Full     Admit Date: 1/13/2021       Hospital Day: 2      Subjective:     Chief Compliant: Nausea post calcitonin.  Seen with nurse Monet at the bedside.      Review of Systems:   Review of Systems   Constitutional: Negative for fatigue and fever.   HENT: Negative for congestion and trouble swallowing.    Eyes: Negative for redness and visual disturbance.   Respiratory: Negative for cough and shortness of breath.    Cardiovascular: Negative for chest pain and leg swelling.   Gastrointestinal: Negative for abdominal pain and vomiting.   Endocrine: Negative for cold intolerance and heat intolerance.   Genitourinary: Negative for dysuria and flank pain.   Musculoskeletal: Negative for neck stiffness.   Skin: Positive for pallor.   Neurological: Negative for facial asymmetry, speech difficulty and weakness.   Hematological: Negative for adenopathy. Does not bruise/bleed easily.   Psychiatric/Behavioral: Negative for agitation and hallucinations.           Medications:   Scheduled Meds:aspirin, 81 mg, Oral, Daily  clopidogrel, 75 mg, Oral, Daily  furosemide, 40 mg, Intravenous, Q12H  levothyroxine, 137 mcg, Oral, Q AM  predniSONE, 5 mg, Oral, Daily With Breakfast  senna-docusate sodium, 2 tablet, Oral, BID  sodium chloride, 10 mL, Intravenous, Q12H        Continuous Infusions:sodium chloride, 150 mL/hr, Last Rate: 150 mL/hr (01/15/21 0436)        Labs:     Lab Results (last 72 hours)     Procedure Component Value Units Date/Time    Comprehensive Metabolic Panel [[id number]]   (Abnormal) Collected: 01/15/21 0404    Specimen: Blood Updated: 01/15/21 0508     Glucose 88 mg/dL      BUN 22 mg/dL      Creatinine 3.10 mg/dL      Sodium 141 mmol/L      Potassium 2.8 mmol/L      Chloride 104 mmol/L      CO2 25.0 mmol/L      Calcium 9.7 mg/dL      Total Protein 7.4 g/dL      Albumin 3.90 g/dL      ALT (SGPT) 15 U/L      AST (SGOT) 15 U/L      Alkaline Phosphatase 131 U/L      Total Bilirubin 0.2 mg/dL      eGFR Non African Amer 16 mL/min/1.73      Globulin 3.5 gm/dL      A/G Ratio 1.1 g/dL      BUN/Creatinine Ratio 7.1     Anion Gap 12.0 mmol/L     Narrative:      GFR Normal >60  Chronic Kidney Disease <60  Kidney Failure <15      TSH [[id number]]  (Normal) Collected: 01/15/21 0404    Specimen: Blood Updated: 01/15/21 0508     TSH 1.730 uIU/mL     T4, Free [[id number]]  (Normal) Collected: 01/15/21 0404    Specimen: Blood Updated: 01/15/21 0508     Free T4 1.60 ng/dL     Narrative:      Results may be falsely increased if patient taking Biotin.      CBC & Differential [[id number]]  (Abnormal) Collected: 01/15/21 0404    Specimen: Blood Updated: 01/15/21 0439    Narrative:      The following orders were created for panel order CBC & Differential.  Procedure                               Abnormality         Status                     ---------                               -----------         ------                     CBC Auto Differential[[id number]]        Abnormal            Final result                 Please view results for these tests on the individual orders.    CBC Auto Differential [[id number]]  (Abnormal) Collected: 01/15/21 0404    Specimen: Blood Updated: 01/15/21 0439     WBC 10.19 10*3/mm3      RBC 3.66 10*6/mm3      Hemoglobin 11.1 g/dL      Hematocrit 32.2 %      MCV 88.0 fL      MCH 30.3 pg      MCHC 34.5 g/dL      RDW 12.4 %      RDW-SD 39.9 fl      MPV 9.6 fL      Platelets 272 10*3/mm3      Neutrophil % 67.9 %      Lymphocyte % 14.0 %      Monocyte % 14.4 %      Eosinophil % 2.1 %       Basophil % 0.8 %      Immature Grans % 0.8 %      Neutrophils, Absolute 6.92 10*3/mm3      Lymphocytes, Absolute 1.43 10*3/mm3      Monocytes, Absolute 1.47 10*3/mm3      Eosinophils, Absolute 0.21 10*3/mm3      Basophils, Absolute 0.08 10*3/mm3      Immature Grans, Absolute 0.08 10*3/mm3      nRBC 0.0 /100 WBC     Vitamin D 25 Hydroxy [[id number]]  (Normal) Collected: 01/14/21 0451    Specimen: Blood Updated: 01/14/21 1225     25 Hydroxy, Vitamin D 41.3 ng/ml     Narrative:      Reference Range for Total Vitamin D 25(OH)     Deficiency <20.0 ng/mL   Insufficiency 21-29 ng/mL   Sufficiency  ng/mL  Toxicity >100 ng/ml    Results may be falsely increased if patient taking Biotin.      Basic Metabolic Panel [[id number]]  (Abnormal) Collected: 01/14/21 0747    Specimen: Blood Updated: 01/14/21 0847     Glucose 90 mg/dL      BUN 32 mg/dL      Creatinine 3.52 mg/dL      Sodium 142 mmol/L      Potassium 3.3 mmol/L      Chloride 103 mmol/L      CO2 27.0 mmol/L      Calcium 12.0 mg/dL      eGFR   Amer --     Comment: <15 Indicative of kidney failure.        eGFR Non African Amer 14 mL/min/1.73      Comment: <15 Indicative of kidney failure.        BUN/Creatinine Ratio 9.1     Anion Gap 12.0 mmol/L     Narrative:      GFR Normal >60  Chronic Kidney Disease <60  Kidney Failure <15      Reticulocytes [[id number]]  (Normal) Collected: 01/14/21 0451    Specimen: Blood Updated: 01/14/21 0741     Reticulocyte % 1.30 %      Reticulocyte Absolute 0.0458 10*6/mm3     Ferritin [[id number]]  (Abnormal) Collected: 01/14/21 0451    Specimen: Blood Updated: 01/14/21 0725     Ferritin 235.50 ng/mL     Narrative:      Results may be falsely decreased if patient taking Biotin.      Iron Profile [[id number]]  (Abnormal) Collected: 01/14/21 0451    Specimen: Blood Updated: 01/14/21 0725     Iron 61 mcg/dL      Iron Saturation 24 %      Transferrin 170 mg/dL      TIBC 253 mcg/dL     Folate [[id number]]  (Normal) Collected: 01/13/21  1214    Specimen: Blood Updated: 01/14/21 0719     Folate 10.10 ng/mL     Narrative:      Results may be falsely increased if patient taking Biotin.      Comprehensive Metabolic Panel [[id number]]  (Abnormal) Collected: 01/14/21 0451    Specimen: Blood Updated: 01/14/21 0551     Glucose 117 mg/dL      BUN 32 mg/dL      Creatinine 3.59 mg/dL      Sodium 140 mmol/L      Potassium 3.2 mmol/L      Chloride 102 mmol/L      CO2 28.0 mmol/L      Calcium 12.2 mg/dL      Total Protein 7.5 g/dL      Albumin 3.80 g/dL      ALT (SGPT) 22 U/L      AST (SGOT) 17 U/L      Alkaline Phosphatase 135 U/L      Total Bilirubin 0.2 mg/dL      eGFR Non African Amer 13 mL/min/1.73      Comment: <15 Indicative of kidney failure.        eGFR   Amer --     Comment: <15 Indicative of kidney failure.        Globulin 3.7 gm/dL      A/G Ratio 1.0 g/dL      BUN/Creatinine Ratio 8.9     Anion Gap 10.0 mmol/L     Narrative:      GFR Normal >60  Chronic Kidney Disease <60  Kidney Failure <15      Uric Acid [[id number]]  (Normal) Collected: 01/14/21 0451    Specimen: Blood Updated: 01/14/21 0551     Uric Acid 5.3 mg/dL     CBC Auto Differential [[id number]]  (Abnormal) Collected: 01/14/21 0451    Specimen: Blood Updated: 01/14/21 0524     WBC 11.00 10*3/mm3      RBC 3.60 10*6/mm3      Hemoglobin 10.6 g/dL      Hematocrit 32.4 %      MCV 90.0 fL      MCH 29.4 pg      MCHC 32.7 g/dL      RDW 12.3 %      RDW-SD 40.0 fl      MPV 9.8 fL      Platelets 314 10*3/mm3      Neutrophil % 63.8 %      Lymphocyte % 18.8 %      Monocyte % 13.8 %      Eosinophil % 2.1 %      Basophil % 0.7 %      Immature Grans % 0.8 %      Neutrophils, Absolute 7.01 10*3/mm3      Lymphocytes, Absolute 2.07 10*3/mm3      Monocytes, Absolute 1.52 10*3/mm3      Eosinophils, Absolute 0.23 10*3/mm3      Basophils, Absolute 0.08 10*3/mm3      Immature Grans, Absolute 0.09 10*3/mm3      nRBC 0.0 /100 WBC     Eosinophil Smear - Urine, Urine, Clean Catch [[id number]]  (Normal) Collected:  01/13/21 1457    Specimen: Urine, Clean Catch Updated: 01/14/21 0129     Eosinophil Smear 0 % EOS/100 Cells     Creatinine, Urine, Random - Urine, Clean Catch [[id number]] Collected: 01/13/21 1457    Specimen: Urine, Clean Catch Updated: 01/14/21 0108     Creatinine, Urine 36.5 mg/dL     Narrative:      Reference intervals for random urine have not been established.  Clinical usage is dependent upon physician's interpretation in combination with other laboratory tests.       Basic Metabolic Panel [[id number]]  (Abnormal) Collected: 01/13/21 2320    Specimen: Blood Updated: 01/14/21 0036     Glucose 107 mg/dL      BUN 32 mg/dL      Creatinine 3.75 mg/dL      Sodium 139 mmol/L      Potassium 3.5 mmol/L      Chloride 100 mmol/L      CO2 27.0 mmol/L      Calcium 13.2 mg/dL      eGFR   Amer --     Comment: <15 Indicative of kidney failure.        eGFR Non African Amer 13 mL/min/1.73      Comment: <15 Indicative of kidney failure.        BUN/Creatinine Ratio 8.5     Anion Gap 12.0 mmol/L     Narrative:      GFR Normal >60  Chronic Kidney Disease <60  Kidney Failure <15      Angiotensin Converting Enzyme [[id number]] Collected: 01/13/21 2320    Specimen: Blood Updated: 01/14/21 0013    Vitamin D 25 Hydroxy [[id number]]  (Normal) Collected: 01/13/21 1214    Specimen: Blood Updated: 01/13/21 2000     25 Hydroxy, Vitamin D 49.4 ng/ml     Narrative:      Reference Range for Total Vitamin D 25(OH)     Deficiency <20.0 ng/mL   Insufficiency 21-29 ng/mL   Sufficiency  ng/mL  Toxicity >100 ng/ml    Results may be falsely increased if patient taking Biotin.      Vitamin B12 [[id number]]  (Normal) Collected: 01/13/21 1214    Specimen: Blood Updated: 01/13/21 1953     Vitamin B-12 463 pg/mL     Narrative:      Results may be falsely increased if patient taking Biotin.      Basic Metabolic Panel [[id number]]  (Abnormal) Collected: 01/13/21 1617    Specimen: Blood Updated: 01/13/21 1658     Glucose 86 mg/dL      BUN 36 mg/dL       Creatinine 3.88 mg/dL      Sodium 140 mmol/L      Potassium 3.9 mmol/L      Chloride 101 mmol/L      CO2 28.0 mmol/L      Calcium 14.9 mg/dL      eGFR   Amer --     Comment: <15 Indicative of kidney failure.        eGFR Non African Amer 12 mL/min/1.73      Comment: <15 Indicative of kidney failure.        BUN/Creatinine Ratio 9.3     Anion Gap 11.0 mmol/L     Narrative:      GFR Normal >60  Chronic Kidney Disease <60  Kidney Failure <15      Protein Elec + Interp, Serum [[id number]] Collected: 01/13/21 1617    Specimen: Blood Updated: 01/13/21 1630    PTH-related Peptide [[id number]] Collected: 01/13/21 1617    Specimen: Blood Updated: 01/13/21 1630    Osmolality, Urine - Urine, Clean Catch [[id number]]  (Abnormal) Collected: 01/13/21 1457    Specimen: Urine, Clean Catch Updated: 01/13/21 1601     Osmolality, Urine 243 mOsm/kg     Sodium, Urine, Random - Urine, Clean Catch [[id number]] Collected: 01/13/21 1457    Specimen: Urine, Clean Catch Updated: 01/13/21 1550     Sodium, Urine 71 mmol/L     Narrative:      Reference intervals for random urine have not been established.  Clinical usage is dependent upon physician's interpretation in combination with other laboratory tests.       Protein, Urine, Random - Urine, Clean Catch [[id number]] Collected: 01/13/21 1457    Specimen: Urine, Clean Catch Updated: 01/13/21 1549     Total Protein, Urine 13.8 mg/dL     Narrative:      Reference intervals for random urine have not been established.  Clinical usage is dependent upon physician's interpretation in combination with other laboratory tests.       Urinalysis With Microscopic If Indicated (No Culture) - Urine, Clean Catch [[id number]]  (Abnormal) Collected: 01/13/21 1457    Specimen: Urine, Clean Catch Updated: 01/13/21 1534     Color, UA Yellow     Appearance, UA Clear     pH, UA 8.0     Specific Gravity, UA 1.008     Glucose, UA Negative     Ketones, UA Negative     Bilirubin, UA Negative     Blood, UA Negative      Protein, UA Trace     Leuk Esterase, UA Trace     Nitrite, UA Negative     Urobilinogen, UA 0.2 E.U./dL    Urinalysis, Microscopic Only - Urine, Clean Catch [[id number]]  (Abnormal) Collected: 01/13/21 1457    Specimen: Urine, Clean Catch Updated: 01/13/21 1534     RBC, UA 0-2 /HPF      WBC, UA 3-5 /HPF      Bacteria, UA None Seen /HPF      Squamous Epithelial Cells, UA 3-6 /HPF      Hyaline Casts, UA 0-2 /LPF      Methodology Automated Microscopy    Calcium, Ionized [[id number]]  (Abnormal) Collected: 01/13/21 1435    Specimen: Blood Updated: 01/13/21 1454     Ionized Calcium >7.21 mg/dL      Comment: 93 Value above reportable range > 7.21        Notified Who [id number]     Notified By [id number]     Notified Time 01/13/2021 14:55     Collected by [id number]     Comment: Meter: [id number]     :  [id number]       PTH, Intact [[id number]]  (Abnormal) Collected: 01/13/21 1214    Specimen: Blood Updated: 01/13/21 1308     PTH, Intact 1.4 pg/mL     Narrative:      Results may be falsely decreased if patient taking Biotin.      Comprehensive Metabolic Panel [[id number]]  (Abnormal) Collected: 01/13/21 1214    Specimen: Blood Updated: 01/13/21 1257     Glucose 94 mg/dL      BUN 36 mg/dL      Creatinine 3.78 mg/dL      Sodium 138 mmol/L      Potassium 3.8 mmol/L      Chloride 98 mmol/L      CO2 29.0 mmol/L      Calcium 15.7 mg/dL      Total Protein 9.0 g/dL      Albumin 4.50 g/dL      ALT (SGPT) 24 U/L      AST (SGOT) 18 U/L      Alkaline Phosphatase 177 U/L      Total Bilirubin 0.2 mg/dL      eGFR Non African Amer 13 mL/min/1.73      Comment: <15 Indicative of kidney failure.        eGFR   Amer --     Comment: <15 Indicative of kidney failure.        Globulin 4.5 gm/dL      A/G Ratio 1.0 g/dL      BUN/Creatinine Ratio 9.5     Anion Gap 11.0 mmol/L     Narrative:      GFR Normal >60  Chronic Kidney Disease <60  Kidney Failure <15      COVID-19, [facility]-HOU[facility],NASAL Swab (NO TRANSPORT MEDIA) 2 HR TAT - Swab, Nasopharynx  [[id number]]  (Normal) Collected: 01/13/21 1218    Specimen: Swab from Nasopharynx Updated: 01/13/21 1250     COVID19 Presumptive Negative    Narrative:      Fact sheet for providers: https://www.fda.gov/media/130208/download     Fact sheet for patients: https://www.fda.gov/media/852486/download    Test performed by PCR.  If inconsistent with clinical signs and symptoms patient should be tested with different authorized molecular test.    Ferritin [[id number]]  (Abnormal) Collected: 01/13/21 1214    Specimen: Blood Updated: 01/13/21 1248     Ferritin 271.70 ng/mL     Narrative:      Results may be falsely decreased if patient taking Biotin.      Iron Profile [[id number]]  (Abnormal) Collected: 01/13/21 1214    Specimen: Blood Updated: 01/13/21 1243     Iron 59 mcg/dL      Iron Saturation 19 %      Transferrin 213 mg/dL      TIBC 317 mcg/dL     Phosphorus [[id number]]  (Normal) Collected: 01/13/21 1214    Specimen: Blood Updated: 01/13/21 1240     Phosphorus 3.3 mg/dL     Magnesium [[id number]]  (Normal) Collected: 01/13/21 1214    Specimen: Blood Updated: 01/13/21 1238     Magnesium 2.2 mg/dL     CBC Auto Differential [[id number]]  (Abnormal) Collected: 01/13/21 1214    Specimen: Blood Updated: 01/13/21 1221     WBC 11.22 10*3/mm3      RBC 3.97 10*6/mm3      Hemoglobin 12.2 g/dL      Hematocrit 35.1 %      MCV 88.4 fL      MCH 30.7 pg      MCHC 34.8 g/dL      RDW 12.4 %      RDW-SD 39.8 fl      MPV 9.5 fL      Platelets 341 10*3/mm3      Neutrophil % 73.9 %      Lymphocyte % 14.4 %      Monocyte % 9.1 %      Eosinophil % 1.0 %      Basophil % 0.7 %      Immature Grans % 0.9 %      Neutrophils, Absolute 8.29 10*3/mm3      Lymphocytes, Absolute 1.62 10*3/mm3      Monocytes, Absolute 1.02 10*3/mm3      Eosinophils, Absolute 0.11 10*3/mm3      Basophils, Absolute 0.08 10*3/mm3      Immature Grans, Absolute 0.10 10*3/mm3      nRBC 0.0 /100 WBC             Radiology:     Imaging Results (Last 72 Hours)     Procedure Component  Value Units Date/Time    CT CHEST WITHOUT CONTRAST DIAGNOSTIC [[id number]] Collected: 01/13/21 2026     Updated: 01/13/21 2045    Narrative:      EXAMINATION: CT CHEST WO CONTRAST- 1/13/2021 8:26 PM CST     HISTORY: Interstitial lung disease, history of sarcoidosis and thyroid  carcinoma. Retrocrural lymphadenopathy. Fibrosing mediastinitis.     DOSE: 191 mGycm (Automatic exposure control technique was implemented in  an effort to keep the radiation dose as low as possible without  compromising image quality)     REPORT: Spiral CT of the chest was performed without intravenous  contrast from the thoracic inlet through the upper abdomen.  Reconstructed coronal and sagittal images are also reviewed.     Comparison: CT abdomen pelvis with contrast 12/30/2020. Chest x-rays  2/19/2016.     Review of lung windows demonstrates mild respiratory motion artifact.  There is mild hyperinflation lungs with mild emphysematous changes.  There is coarse thickening of interlobular septa in the lung bases,  greater right than likely related to chronic interstitial fibrosis and  the history of sarcoidosis. There is associated pleural thickening in  the left lateral lung base, with adjacent coarse calcifications. No lung  consolidation is identified to indicate acute pneumonia. There are  tubular structures and both lungs compatible with multiple dilated  pulmonary veins, which correspond with similar to prior structures on  the chest x-ray from 2016, gray similar in appearance. There is a stent  within the right middle lobe pulmonary vein. There are enlarged lymph  nodes with extensive calcifications at the bilateral hilar, subcarinal  and right paratracheal ivonne stations, compatible with chronic  sarcoidosis. Patency of the vascular structures is not determined  without intravenous contrast. The central pulmonary arteries are dilated  compatible pulmonary arterial hypertension. For example, the main  pulmonary artery measures 4.4 cm  in diameter. The thoracic aorta is  normal caliber. There is mildly increased attenuation of fat planes  within the mediastinum or diffusely compatible with the history of  fibrosing mediastinitis. Heart size is normal. There is trace  pericardial fluid. In the upper abdomen, there are coarse calcifications  within the cortex of both kidneys, greater on the left and likely  related to renal involvement by sarcoidosis. There is a enlarged  noncalcified lymph node to the right of the esophagus in the posterior  mediastinum axial image 118, this lymph node measures 1.3 cm. There is  right retrocrural lymphadenopathy, with a maximum diameter 10.8 mm.  There are normal sized lymph nodes in the upper retroperitoneum. Review  of bone windows is unremarkable.       Impression:      1. Enlarged coarsely calcified mediastinal and hilar lymph nodes  compatible with the history of sarcoidosis. There is also interstitial  thickening in the lungs which appears to be related to chronic fibrosis  related to sarcoid disease. No superimposed acute infiltrate or lung  consolidation is identified.  2. Dilated pulmonary arteries compatible with pulmonary arterial  hypertension. There is also evidence of chronic pulmonary venous  hypertension, with multiple dilated tortuous pulmonary veins within the  lungs bilaterally, and there is evidence of previous stent insertion in  one of the main right pulmonary veins inferior to the right hilum.  Increased attenuation and stranding of mediastinal fat planes is  compatible with the history of fibrosing mediastinitis.  3. There is a small noncalcified lymph node in the posterior mediastinum  measuring 10 mm and a noncalcified 10 mm lymph node in the right  retrocrural space, compatible with mild retrocrural lymphadenopathy.  This may also be related to sarcoid disease.  4. Coarse calcifications within the kidneys bilaterally is most likely a  manifestation of renal sarcoidosis. There is no  evidence of  hydronephrosis.              This report was finalized on 01/13/2021 20:42 by Dr. Chas Best MD.    US Renal Bilateral [[id number]] Collected: 01/13/21 1405     Updated: 01/13/21 1414    Narrative:      EXAM: US RENAL BILATERAL- - 1/13/2021 1:13 PM CST     HISTORY: acute kidney failure       COMPARISON: 12/30/2020.      TECHNIQUE: Real time grayscale and color ultrasound performed with  representative images saved to PACS.      FINDINGS:  AORTA. Normal caliber by ultrasound.      RIGHT KIDNEY. Measures 10.7 x 5.0 x 5.9 cm. Normal renal contour. There  are chunky shadowing echogenic foci involving the renal parenchyma. No  hydronephrosis.     LEFT KIDNEY. Measures 10.6 x 6.3 x 4.8 cm. Normal renal contour. There  are coarse chunky calcification. No hydronephrosis.     BLADDER. Under distended urinary bladder appears grossly normal.          Impression:      1. No hydronephrosis.  2. Redemonstration of bilateral renal parenchymal calcifications,  suggestive of nephrocalcinosis, which has a broad differential.  This report was finalized on 01/13/2021 14:11 by Dr Mounika Goodson MD.            Objective:   Vitals: /60 (BP Location: Left arm, Patient Position: Lying)   Pulse 80   Temp 98.9 °F (37.2 °C) (Oral)   Resp 16   Wt 84 kg (185 lb 2 oz)   LMP 10/12/2005 (Approximate) Comment: Pelvic pain  SpO2 96%   BMI 29.89 kg/m²   Physical Exam  Vitals signs and nursing note reviewed.   Constitutional:       General: She is not in acute distress.     Appearance: She is not ill-appearing.   HENT:      Head: Normocephalic and atraumatic.   Eyes:      General: No scleral icterus.  Neck:      Musculoskeletal: Neck supple.   Cardiovascular:      Rate and Rhythm: Normal rate and regular rhythm.   Pulmonary:      Effort: No respiratory distress.      Breath sounds: No wheezing or rales.   Abdominal:      General: Bowel sounds are normal. There is no distension.      Palpations: Abdomen is soft.    Musculoskeletal:         General: No swelling.   Skin:     General: Skin is warm and dry.      Coloration: Skin is pale.   Neurological:      Mental Status: She is alert and oriented to person, place, and time.   Psychiatric:         Mood and Affect: Mood normal.         Behavior: Behavior normal.         Thought Content: Thought content normal.         Judgment: Judgment normal.       24HR INTAKE/OUTPUT:      Intake/Output Summary (Last 24 hours) at 1/15/2021 0629  Last data filed at 1/15/2021 0436  Gross per 24 hour   Intake 2400 ml   Output 5450 ml   Net -3050 ml        Problem list:       RUSSELL (acute kidney injury) (CMS/[facility])      Assessment/Plan:     ASSESSMENT:   1.  Lymphadenopathy, left cardiac and right retrocrural, from sarcoidosis.  2.  Hypercalcemia resolved 01/15/2021, likely from sarcoidosis.  Intact PTH 1.4.  3.  Sarcoidosis.  4.  Leukocytosis likely reactive process.  5.  Acute kidney injury.  6.  Normocytic anemia from chronic disease.  Saturation 24% and ferritin 235.5 on 01/14/2021.  7.  History of T1b, N0, follicular thyroid cancer post right hemithyroidectomy by Dr. Luu on 02/16/2016.        PLAN:  1.   Re: Heme status remarkable for hemoglobin 11.1.  2.   Re: CMP. GFR 16 ml/min, potassium 2.8, alkaline phosphatase 131, and calcium 9.7.  Normal TSH and T4.  3.  Post calcitonin 100 units subcutaneous every 12 hours 01/14/2020 and Zometa given 01/13/2021.  4.  Await serum protein electrophoresis and PTH related protein.  5.  No node biopsy plan at this point.     6.  Clinic appointment in 2 weeks.  I will sign off.  Please call if needed.  7.  Above discussed with patient and nurse Tierney at the bedside.  They verbalized understanding.

## 2021-01-15 NOTE — PLAN OF CARE
Problem: Adult Inpatient Plan of Care  Goal: Plan of Care Review  Outcome: Ongoing, Progressing  Flowsheets (Taken 1/15/2021 4445)  Progress: improving  Plan of Care Reviewed With: patient  Outcome Summary: VSS. pt c/o nausea @ beginning of shift, treated w/ PO phenergan w/ good results. S/ST  on tele. IVF 150ml/hr continue. room air. up ad linh. pt voiding adequately. continue IV lasix. safety maintained. continue to monitor.

## 2021-01-15 NOTE — PROGRESS NOTES
Ines Rowland is a 50 y.o. female patient.  Looks better  Feels better  Calcium back to normal      Current Facility-Administered Medications   Medication Dose Route Frequency Provider Last Rate Last Admin   • acetaminophen (TYLENOL) tablet 650 mg  650 mg Oral Q4H PRN Moises Peterson APRN        Or   • acetaminophen (TYLENOL) 160 MG/5ML solution 650 mg  650 mg Oral Q4H PRN Moises Peterson APRN        Or   • acetaminophen (TYLENOL) suppository 650 mg  650 mg Rectal Q4H PRN Moises Peterson APRN       • aspirin EC tablet 81 mg  81 mg Oral Daily Moises Peterson APRN   81 mg at 01/15/21 0839   • clopidogrel (PLAVIX) tablet 75 mg  75 mg Oral Daily Moises Peterson APRN   75 mg at 01/15/21 0839   • [START ON 1/16/2021] furosemide (LASIX) injection 40 mg  40 mg Intravenous Daily Jose Elias Holliday APRN       • HYDROcodone-acetaminophen (NORCO) 5-325 MG per tablet 1 tablet  1 tablet Oral Q4H PRN Moises Peterson APRN       • levothyroxine (SYNTHROID, LEVOTHROID) tablet 137 mcg  137 mcg Oral Q AM Moises Peterson APRN   137 mcg at 01/15/21 0558   • ondansetron (ZOFRAN) tablet 4 mg  4 mg Oral Q6H PRN Moises Peterson APRN        Or   • ondansetron (ZOFRAN) injection 4 mg  4 mg Intravenous Q6H PRN Moises Peterson APRN   4 mg at 01/14/21 1800   • predniSONE (DELTASONE) tablet 5 mg  5 mg Oral Daily With Breakfast Moises Peterson APRN   5 mg at 01/15/21 0839   • promethazine (PHENERGAN) tablet 25 mg  25 mg Oral Q6H PRN Chas Navarro MD   25 mg at 01/14/21 2015   • sennosides-docusate (PERICOLACE) 8.6-50 MG per tablet 2 tablet  2 tablet Oral BID Moises Peterson APRN   2 tablet at 01/15/21 0839   • sodium chloride 0.9 % flush 10 mL  10 mL Intravenous Q12H Moises Peterson APRN   10 mL at 01/14/21 2005   • sodium chloride 0.9 % flush 10 mL  10 mL Intravenous PRN Moises Peterson, ORACIO       • sodium chloride 0.9 % infusion  75 mL/hr Intravenous Continuous Jose Elias Holliday, APRN 75  mL/hr at 01/15/21 1520 75 mL/hr at 01/15/21 1520     ALLERGIES:  No Known Allergies    RUSSELL (acute kidney injury) (CMS/[facility])    Blood pressure 122/61, pulse 62, temperature 98.6 °F (37 °C), temperature source Oral, resp. rate 18, weight 84 kg (185 lb 2 oz), last menstrual period 10/12/2005, SpO2 97 %.      Subjective:  Symptoms:  Stable.    Diet:  Adequate intake.    Activity level: Returning to normal.    Pain:  She reports no pain.      Review of Systems  Objective:  General Appearance:  Comfortable and well-appearing.    Vital signs: (most recent): Blood pressure 122/61, pulse 62, temperature 98.6 °F (37 °C), temperature source Oral, resp. rate 18, weight 84 kg (185 lb 2 oz), last menstrual period 10/12/2005, SpO2 97 %.  Vital signs are normal.    Output: Producing urine and minimal stool output.    HEENT: Normal HEENT exam.    Lungs:  Normal effort and normal respiratory rate.    Heart: Normal rate.  Regular rhythm.    Abdomen: Bowel sounds are normal.   There is no abdominal tenderness.     Extremities: Normal range of motion.    Pulses: Distal pulses are intact.    Neurological: Patient is alert.    Skin:  Warm and dry.              Labs:  Lab Results (last 72 hours)     Procedure Component Value Units Date/Time    Protein Elec + Interp, Serum [[id number]] Collected: 01/13/21 1617    Specimen: Blood Updated: 01/15/21 1512     Total Protein 7.3 g/dL      Albumin 3.5 g/dL      Alpha-1-Globulin 0.3 g/dL      Alpha-2-Globulin 1.0 g/dL      Beta Globulin 1.1 g/dL      Gamma Globulin 1.3 g/dL      M-Timi Not Observed g/dL      Globulin 3.8 g/dL      A/G Ratio 0.9     Please note Comment     Comment: Protein electrophoresis scan will follow via computer, mail, or   delivery.        SPE Interpretation Comment     Comment: The SPE pattern appears unremarkable. Evidence of monoclonal  protein is not apparent.       Narrative:      Performed at:  [address] [place] [address]  [id number]  Lab  Director: Doug Frias PhD, Phone:  [phone]    Basic Metabolic Panel [[id number]]  (Abnormal) Collected: 01/15/21 1301    Specimen: Blood Updated: 01/15/21 1509     Glucose 73 mg/dL      BUN 23 mg/dL      Creatinine 3.00 mg/dL      Sodium 141 mmol/L      Potassium 3.2 mmol/L      Chloride 102 mmol/L      CO2 26.0 mmol/L      Calcium 9.6 mg/dL      eGFR Non African Amer 17 mL/min/1.73      BUN/Creatinine Ratio 7.7     Anion Gap 13.0 mmol/L     Narrative:      GFR Normal >60  Chronic Kidney Disease <60  Kidney Failure <15      Angiotensin Converting Enzyme [[id number]] Collected: 01/13/21 2320    Specimen: Blood Updated: 01/15/21 1210     Angiotensin Converting Enzyme 28 U/L     Narrative:      Performed at:  [address] [place] [address]  [id number]  : Doug Frias PhD, Phone:  [phone]    Magnesium [[id number]]  (Normal) Collected: 01/15/21 0404    Specimen: Blood Updated: 01/15/21 0848     Magnesium 1.6 mg/dL     Comprehensive Metabolic Panel [[id number]]  (Abnormal) Collected: 01/15/21 0404    Specimen: Blood Updated: 01/15/21 0508     Glucose 88 mg/dL      BUN 22 mg/dL      Creatinine 3.10 mg/dL      Sodium 141 mmol/L      Potassium 2.8 mmol/L      Chloride 104 mmol/L      CO2 25.0 mmol/L      Calcium 9.7 mg/dL      Total Protein 7.4 g/dL      Albumin 3.90 g/dL      ALT (SGPT) 15 U/L      AST (SGOT) 15 U/L      Alkaline Phosphatase 131 U/L      Total Bilirubin 0.2 mg/dL      eGFR Non African Amer 16 mL/min/1.73      Globulin 3.5 gm/dL      A/G Ratio 1.1 g/dL      BUN/Creatinine Ratio 7.1     Anion Gap 12.0 mmol/L     Narrative:      GFR Normal >60  Chronic Kidney Disease <60  Kidney Failure <15      TSH [[id number]]  (Normal) Collected: 01/15/21 0404    Specimen: Blood Updated: 01/15/21 0508     TSH 1.730 uIU/mL     T4, Free [[id number]]  (Normal) Collected: 01/15/21 0404    Specimen: Blood Updated: 01/15/21 0508     Free T4 1.60 ng/dL     Narrative:      Results may be  falsely increased if patient taking Biotin.      CBC & Differential [[id number]]  (Abnormal) Collected: 01/15/21 0404    Specimen: Blood Updated: 01/15/21 0439    Narrative:      The following orders were created for panel order CBC & Differential.  Procedure                               Abnormality         Status                     ---------                               -----------         ------                     CBC Auto Differential[[id number]]        Abnormal            Final result                 Please view results for these tests on the individual orders.    CBC Auto Differential [[id number]]  (Abnormal) Collected: 01/15/21 0404    Specimen: Blood Updated: 01/15/21 0439     WBC 10.19 10*3/mm3      RBC 3.66 10*6/mm3      Hemoglobin 11.1 g/dL      Hematocrit 32.2 %      MCV 88.0 fL      MCH 30.3 pg      MCHC 34.5 g/dL      RDW 12.4 %      RDW-SD 39.9 fl      MPV 9.6 fL      Platelets 272 10*3/mm3      Neutrophil % 67.9 %      Lymphocyte % 14.0 %      Monocyte % 14.4 %      Eosinophil % 2.1 %      Basophil % 0.8 %      Immature Grans % 0.8 %      Neutrophils, Absolute 6.92 10*3/mm3      Lymphocytes, Absolute 1.43 10*3/mm3      Monocytes, Absolute 1.47 10*3/mm3      Eosinophils, Absolute 0.21 10*3/mm3      Basophils, Absolute 0.08 10*3/mm3      Immature Grans, Absolute 0.08 10*3/mm3      nRBC 0.0 /100 WBC     Vitamin D 25 Hydroxy [[id number]]  (Normal) Collected: 01/14/21 0451    Specimen: Blood Updated: 01/14/21 1225     25 Hydroxy, Vitamin D 41.3 ng/ml     Narrative:      Reference Range for Total Vitamin D 25(OH)     Deficiency <20.0 ng/mL   Insufficiency 21-29 ng/mL   Sufficiency  ng/mL  Toxicity >100 ng/ml    Results may be falsely increased if patient taking Biotin.      Basic Metabolic Panel [[id number]]  (Abnormal) Collected: 01/14/21 0747    Specimen: Blood Updated: 01/14/21 0847     Glucose 90 mg/dL      BUN 32 mg/dL      Creatinine 3.52 mg/dL      Sodium 142 mmol/L      Potassium 3.3 mmol/L       Chloride 103 mmol/L      CO2 27.0 mmol/L      Calcium 12.0 mg/dL      eGFR   Amer --     Comment: <15 Indicative of kidney failure.        eGFR Non African Amer 14 mL/min/1.73      Comment: <15 Indicative of kidney failure.        BUN/Creatinine Ratio 9.1     Anion Gap 12.0 mmol/L     Narrative:      GFR Normal >60  Chronic Kidney Disease <60  Kidney Failure <15      Reticulocytes [[id number]]  (Normal) Collected: 01/14/21 0451    Specimen: Blood Updated: 01/14/21 0741     Reticulocyte % 1.30 %      Reticulocyte Absolute 0.0458 10*6/mm3     Ferritin [[id number]]  (Abnormal) Collected: 01/14/21 0451    Specimen: Blood Updated: 01/14/21 0725     Ferritin 235.50 ng/mL     Narrative:      Results may be falsely decreased if patient taking Biotin.      Iron Profile [[id number]]  (Abnormal) Collected: 01/14/21 0451    Specimen: Blood Updated: 01/14/21 0725     Iron 61 mcg/dL      Iron Saturation 24 %      Transferrin 170 mg/dL      TIBC 253 mcg/dL     Folate [[id number]]  (Normal) Collected: 01/13/21 1214    Specimen: Blood Updated: 01/14/21 0719     Folate 10.10 ng/mL     Narrative:      Results may be falsely increased if patient taking Biotin.      Comprehensive Metabolic Panel [[id number]]  (Abnormal) Collected: 01/14/21 0451    Specimen: Blood Updated: 01/14/21 0551     Glucose 117 mg/dL      BUN 32 mg/dL      Creatinine 3.59 mg/dL      Sodium 140 mmol/L      Potassium 3.2 mmol/L      Chloride 102 mmol/L      CO2 28.0 mmol/L      Calcium 12.2 mg/dL      Total Protein 7.5 g/dL      Albumin 3.80 g/dL      ALT (SGPT) 22 U/L      AST (SGOT) 17 U/L      Alkaline Phosphatase 135 U/L      Total Bilirubin 0.2 mg/dL      eGFR Non African Amer 13 mL/min/1.73      Comment: <15 Indicative of kidney failure.        eGFR   Amer --     Comment: <15 Indicative of kidney failure.        Globulin 3.7 gm/dL      A/G Ratio 1.0 g/dL      BUN/Creatinine Ratio 8.9     Anion Gap 10.0 mmol/L     Narrative:      GFR Normal  >60  Chronic Kidney Disease <60  Kidney Failure <15      Uric Acid [[id number]]  (Normal) Collected: 01/14/21 0451    Specimen: Blood Updated: 01/14/21 0551     Uric Acid 5.3 mg/dL     CBC Auto Differential [[id number]]  (Abnormal) Collected: 01/14/21 0451    Specimen: Blood Updated: 01/14/21 0524     WBC 11.00 10*3/mm3      RBC 3.60 10*6/mm3      Hemoglobin 10.6 g/dL      Hematocrit 32.4 %      MCV 90.0 fL      MCH 29.4 pg      MCHC 32.7 g/dL      RDW 12.3 %      RDW-SD 40.0 fl      MPV 9.8 fL      Platelets 314 10*3/mm3      Neutrophil % 63.8 %      Lymphocyte % 18.8 %      Monocyte % 13.8 %      Eosinophil % 2.1 %      Basophil % 0.7 %      Immature Grans % 0.8 %      Neutrophils, Absolute 7.01 10*3/mm3      Lymphocytes, Absolute 2.07 10*3/mm3      Monocytes, Absolute 1.52 10*3/mm3      Eosinophils, Absolute 0.23 10*3/mm3      Basophils, Absolute 0.08 10*3/mm3      Immature Grans, Absolute 0.09 10*3/mm3      nRBC 0.0 /100 WBC     Eosinophil Smear - Urine, Urine, Clean Catch [[id number]]  (Normal) Collected: 01/13/21 1457    Specimen: Urine, Clean Catch Updated: 01/14/21 0129     Eosinophil Smear 0 % EOS/100 Cells     Creatinine, Urine, Random - Urine, Clean Catch [[id number]] Collected: 01/13/21 1457    Specimen: Urine, Clean Catch Updated: 01/14/21 0108     Creatinine, Urine 36.5 mg/dL     Narrative:      Reference intervals for random urine have not been established.  Clinical usage is dependent upon physician's interpretation in combination with other laboratory tests.       Basic Metabolic Panel [[id number]]  (Abnormal) Collected: 01/13/21 2320    Specimen: Blood Updated: 01/14/21 0036     Glucose 107 mg/dL      BUN 32 mg/dL      Creatinine 3.75 mg/dL      Sodium 139 mmol/L      Potassium 3.5 mmol/L      Chloride 100 mmol/L      CO2 27.0 mmol/L      Calcium 13.2 mg/dL      eGFR   Amer --     Comment: <15 Indicative of kidney failure.        eGFR Non African Amer 13 mL/min/1.73      Comment: <15 Indicative  of kidney failure.        BUN/Creatinine Ratio 8.5     Anion Gap 12.0 mmol/L     Narrative:      GFR Normal >60  Chronic Kidney Disease <60  Kidney Failure <15      Vitamin D 25 Hydroxy [[id number]]  (Normal) Collected: 01/13/21 1214    Specimen: Blood Updated: 01/13/21 2000     25 Hydroxy, Vitamin D 49.4 ng/ml     Narrative:      Reference Range for Total Vitamin D 25(OH)     Deficiency <20.0 ng/mL   Insufficiency 21-29 ng/mL   Sufficiency  ng/mL  Toxicity >100 ng/ml    Results may be falsely increased if patient taking Biotin.      Vitamin B12 [[id number]]  (Normal) Collected: 01/13/21 1214    Specimen: Blood Updated: 01/13/21 1953     Vitamin B-12 463 pg/mL     Narrative:      Results may be falsely increased if patient taking Biotin.      Basic Metabolic Panel [[id number]]  (Abnormal) Collected: 01/13/21 1617    Specimen: Blood Updated: 01/13/21 1658     Glucose 86 mg/dL      BUN 36 mg/dL      Creatinine 3.88 mg/dL      Sodium 140 mmol/L      Potassium 3.9 mmol/L      Chloride 101 mmol/L      CO2 28.0 mmol/L      Calcium 14.9 mg/dL      eGFR   Amer --     Comment: <15 Indicative of kidney failure.        eGFR Non African Amer 12 mL/min/1.73      Comment: <15 Indicative of kidney failure.        BUN/Creatinine Ratio 9.3     Anion Gap 11.0 mmol/L     Narrative:      GFR Normal >60  Chronic Kidney Disease <60  Kidney Failure <15      PTH-related Peptide [[id number]] Collected: 01/13/21 1617    Specimen: Blood Updated: 01/13/21 1630    Osmolality, Urine - Urine, Clean Catch [[id number]]  (Abnormal) Collected: 01/13/21 1457    Specimen: Urine, Clean Catch Updated: 01/13/21 1601     Osmolality, Urine 243 mOsm/kg     Sodium, Urine, Random - Urine, Clean Catch [[id number]] Collected: 01/13/21 1457    Specimen: Urine, Clean Catch Updated: 01/13/21 1550     Sodium, Urine 71 mmol/L     Narrative:      Reference intervals for random urine have not been established.  Clinical usage is dependent upon physician's  interpretation in combination with other laboratory tests.       Protein, Urine, Random - Urine, Clean Catch [[id number]] Collected: 01/13/21 1457    Specimen: Urine, Clean Catch Updated: 01/13/21 1549     Total Protein, Urine 13.8 mg/dL     Narrative:      Reference intervals for random urine have not been established.  Clinical usage is dependent upon physician's interpretation in combination with other laboratory tests.       Urinalysis With Microscopic If Indicated (No Culture) - Urine, Clean Catch [[id number]]  (Abnormal) Collected: 01/13/21 1457    Specimen: Urine, Clean Catch Updated: 01/13/21 1534     Color, UA Yellow     Appearance, UA Clear     pH, UA 8.0     Specific Gravity, UA 1.008     Glucose, UA Negative     Ketones, UA Negative     Bilirubin, UA Negative     Blood, UA Negative     Protein, UA Trace     Leuk Esterase, UA Trace     Nitrite, UA Negative     Urobilinogen, UA 0.2 E.U./dL    Urinalysis, Microscopic Only - Urine, Clean Catch [[id number]]  (Abnormal) Collected: 01/13/21 1457    Specimen: Urine, Clean Catch Updated: 01/13/21 1534     RBC, UA 0-2 /HPF      WBC, UA 3-5 /HPF      Bacteria, UA None Seen /HPF      Squamous Epithelial Cells, UA 3-6 /HPF      Hyaline Casts, UA 0-2 /LPF      Methodology Automated Microscopy    Calcium, Ionized [[id number]]  (Abnormal) Collected: 01/13/21 1435    Specimen: Blood Updated: 01/13/21 1454     Ionized Calcium >7.21 mg/dL      Comment: 93 Value above reportable range > 7.21        Notified Who [id number]     Notified By [id number]     Notified Time 01/13/2021 14:55     Collected by [id number]     Comment: Meter: [id number]     :  [id number]       PTH, Intact [[id number]]  (Abnormal) Collected: 01/13/21 1214    Specimen: Blood Updated: 01/13/21 1308     PTH, Intact 1.4 pg/mL     Narrative:      Results may be falsely decreased if patient taking Biotin.      Comprehensive Metabolic Panel [[id number]]  (Abnormal) Collected: 01/13/21 1214    Specimen: Blood Updated:  01/13/21 1257     Glucose 94 mg/dL      BUN 36 mg/dL      Creatinine 3.78 mg/dL      Sodium 138 mmol/L      Potassium 3.8 mmol/L      Chloride 98 mmol/L      CO2 29.0 mmol/L      Calcium 15.7 mg/dL      Total Protein 9.0 g/dL      Albumin 4.50 g/dL      ALT (SGPT) 24 U/L      AST (SGOT) 18 U/L      Alkaline Phosphatase 177 U/L      Total Bilirubin 0.2 mg/dL      eGFR Non African Amer 13 mL/min/1.73      Comment: <15 Indicative of kidney failure.        eGFR   Amer --     Comment: <15 Indicative of kidney failure.        Globulin 4.5 gm/dL      A/G Ratio 1.0 g/dL      BUN/Creatinine Ratio 9.5     Anion Gap 11.0 mmol/L     Narrative:      GFR Normal >60  Chronic Kidney Disease <60  Kidney Failure <15      COVID-19, [facility] IN-HOUSE,NASAL Swab (NO TRANSPORT MEDIA) 2 HR TAT - Swab, Nasopharynx [[id number]]  (Normal) Collected: 01/13/21 1218    Specimen: Swab from Nasopharynx Updated: 01/13/21 1250     COVID19 Presumptive Negative    Narrative:      Fact sheet for providers: https://www.fda.gov/media/840881/download     Fact sheet for patients: https://www.fda.gov/media/204769/download    Test performed by PCR.  If inconsistent with clinical signs and symptoms patient should be tested with different authorized molecular test.    Ferritin [[id number]]  (Abnormal) Collected: 01/13/21 1214    Specimen: Blood Updated: 01/13/21 1248     Ferritin 271.70 ng/mL     Narrative:      Results may be falsely decreased if patient taking Biotin.      Iron Profile [[id number]]  (Abnormal) Collected: 01/13/21 1214    Specimen: Blood Updated: 01/13/21 1243     Iron 59 mcg/dL      Iron Saturation 19 %      Transferrin 213 mg/dL      TIBC 317 mcg/dL     Phosphorus [[id number]]  (Normal) Collected: 01/13/21 1214    Specimen: Blood Updated: 01/13/21 1240     Phosphorus 3.3 mg/dL     Magnesium [[id number]]  (Normal) Collected: 01/13/21 1214    Specimen: Blood Updated: 01/13/21 1238     Magnesium 2.2 mg/dL     CBC Auto Differential [[id number]]   (Abnormal) Collected: 01/13/21 1214    Specimen: Blood Updated: 01/13/21 1221     WBC 11.22 10*3/mm3      RBC 3.97 10*6/mm3      Hemoglobin 12.2 g/dL      Hematocrit 35.1 %      MCV 88.4 fL      MCH 30.7 pg      MCHC 34.8 g/dL      RDW 12.4 %      RDW-SD 39.8 fl      MPV 9.5 fL      Platelets 341 10*3/mm3      Neutrophil % 73.9 %      Lymphocyte % 14.4 %      Monocyte % 9.1 %      Eosinophil % 1.0 %      Basophil % 0.7 %      Immature Grans % 0.9 %      Neutrophils, Absolute 8.29 10*3/mm3      Lymphocytes, Absolute 1.62 10*3/mm3      Monocytes, Absolute 1.02 10*3/mm3      Eosinophils, Absolute 0.11 10*3/mm3      Basophils, Absolute 0.08 10*3/mm3      Immature Grans, Absolute 0.10 10*3/mm3      nRBC 0.0 /100 WBC           Imaging Results (Last 72 Hours)     Procedure Component Value Units Date/Time    CT CHEST WITHOUT CONTRAST DIAGNOSTIC [[id number]] Collected: 01/13/21 2026     Updated: 01/13/21 2045    Narrative:      EXAMINATION: CT CHEST WO CONTRAST- 1/13/2021 8:26 PM CST     HISTORY: Interstitial lung disease, history of sarcoidosis and thyroid  carcinoma. Retrocrural lymphadenopathy. Fibrosing mediastinitis.     DOSE: 191 mGycm (Automatic exposure control technique was implemented in  an effort to keep the radiation dose as low as possible without  compromising image quality)     REPORT: Spiral CT of the chest was performed without intravenous  contrast from the thoracic inlet through the upper abdomen.  Reconstructed coronal and sagittal images are also reviewed.     Comparison: CT abdomen pelvis with contrast 12/30/2020. Chest x-rays  2/19/2016.     Review of lung windows demonstrates mild respiratory motion artifact.  There is mild hyperinflation lungs with mild emphysematous changes.  There is coarse thickening of interlobular septa in the lung bases,  greater right than likely related to chronic interstitial fibrosis and  the history of sarcoidosis. There is associated pleural thickening in  the left  lateral lung base, with adjacent coarse calcifications. No lung  consolidation is identified to indicate acute pneumonia. There are  tubular structures and both lungs compatible with multiple dilated  pulmonary veins, which correspond with similar to prior structures on  the chest x-ray from 2016, gray similar in appearance. There is a stent  within the right middle lobe pulmonary vein. There are enlarged lymph  nodes with extensive calcifications at the bilateral hilar, subcarinal  and right paratracheal ivonne stations, compatible with chronic  sarcoidosis. Patency of the vascular structures is not determined  without intravenous contrast. The central pulmonary arteries are dilated  compatible pulmonary arterial hypertension. For example, the main  pulmonary artery measures 4.4 cm in diameter. The thoracic aorta is  normal caliber. There is mildly increased attenuation of fat planes  within the mediastinum or diffusely compatible with the history of  fibrosing mediastinitis. Heart size is normal. There is trace  pericardial fluid. In the upper abdomen, there are coarse calcifications  within the cortex of both kidneys, greater on the left and likely  related to renal involvement by sarcoidosis. There is a enlarged  noncalcified lymph node to the right of the esophagus in the posterior  mediastinum axial image 118, this lymph node measures 1.3 cm. There is  right retrocrural lymphadenopathy, with a maximum diameter 10.8 mm.  There are normal sized lymph nodes in the upper retroperitoneum. Review  of bone windows is unremarkable.       Impression:      1. Enlarged coarsely calcified mediastinal and hilar lymph nodes  compatible with the history of sarcoidosis. There is also interstitial  thickening in the lungs which appears to be related to chronic fibrosis  related to sarcoid disease. No superimposed acute infiltrate or lung  consolidation is identified.  2. Dilated pulmonary arteries compatible with pulmonary  arterial  hypertension. There is also evidence of chronic pulmonary venous  hypertension, with multiple dilated tortuous pulmonary veins within the  lungs bilaterally, and there is evidence of previous stent insertion in  one of the main right pulmonary veins inferior to the right hilum.  Increased attenuation and stranding of mediastinal fat planes is  compatible with the history of fibrosing mediastinitis.  3. There is a small noncalcified lymph node in the posterior mediastinum  measuring 10 mm and a noncalcified 10 mm lymph node in the right  retrocrural space, compatible with mild retrocrural lymphadenopathy.  This may also be related to sarcoid disease.  4. Coarse calcifications within the kidneys bilaterally is most likely a  manifestation of renal sarcoidosis. There is no evidence of  hydronephrosis.              This report was finalized on 01/13/2021 20:42 by Dr. Chas Best MD.    US Renal Bilateral [[id number]] Collected: 01/13/21 1405     Updated: 01/13/21 1414    Narrative:      EXAM: US RENAL BILATERAL- - 1/13/2021 1:13 PM CST     HISTORY: acute kidney failure       COMPARISON: 12/30/2020.      TECHNIQUE: Real time grayscale and color ultrasound performed with  representative images saved to PACS.      FINDINGS:  AORTA. Normal caliber by ultrasound.      RIGHT KIDNEY. Measures 10.7 x 5.0 x 5.9 cm. Normal renal contour. There  are chunky shadowing echogenic foci involving the renal parenchyma. No  hydronephrosis.     LEFT KIDNEY. Measures 10.6 x 6.3 x 4.8 cm. Normal renal contour. There  are coarse chunky calcification. No hydronephrosis.     BLADDER. Under distended urinary bladder appears grossly normal.          Impression:      1. No hydronephrosis.  2. Redemonstration of bilateral renal parenchymal calcifications,  suggestive of nephrocalcinosis, which has a broad differential.  This report was finalized on 01/13/2021 14:11 by Dr Mounika Goodson MD.                Assessment:    Condition: In  stable condition.  Improving.   (    Acute kidney injury- creat trending down, Calcium back to normal, plan to cont hydration today and discharge home in AM if OK w/others    Hypercalcemia- resolved    Malignancy- w/u pending Hem/Onc following    Pan D/C in AM if w/others    Hypokalemia- replacement ordered recheck in AM       ).     Problem List:     RUSSELL (acute kidney injury) (CMS/[facility])    Chas Navarro MD  1/15/2021

## 2021-01-15 NOTE — PROGRESS NOTES
Nephrology ([facility]) Progress Note      Patient:  Ines Rowland  YOB: 1970  Date of Service: 1/15/2021  MRN: [id number]   Acct: [id number]   Primary Care Physician: Chas Navarro MD  Advance Directive:   Code Status and Medical Interventions:   Ordered at: 01/13/21 1201     Level Of Support Discussed With:    Patient     Code Status:    CPR     Medical Interventions (Level of Support Prior to Arrest):    Full     Admit Date: 1/13/2021       Hospital Day: 2  Referring Provider: Chas Navarro MD      Patient personally seen and examined.  Complete chart including Consults, Notes, Operative Reports, Labs, Cardiology, and Radiology studies reviewed as able.        Subjective:  Ines Rowland is a 50 y.o. female  whom we were consulted for acute kidney injury.  History of sarcoidosis and thyroid cancer. Several weeks of GI symptoms including nausea/vomiting and abdominal pain. Denies changes to urination, denies NSAIDs.  Outpatient labs on 12/30 showed creatinine 3.9. repeat labs on 1/12 continued to show elevated creatinine 3.7 and severe hypercalcemia at 15.7. admitted for treatment and further workup.    Today feeling better. No new overnight issues    Allergies:  Patient has no known allergies.    Home Meds:  Medications Prior to Admission   Medication Sig Dispense Refill Last Dose   • albuterol sulfate  (90 Base) MCG/ACT inhaler Inhale 2 puffs Every 4 (Four) Hours As Needed for Wheezing or Shortness of Air.   Past Month at Unknown time   • ALPRAZolam (XANAX) 0.25 MG tablet Take 0.25 mg by mouth 3 (Three) Times a Day As Needed for Anxiety.  1 Past Month at Unknown time   • Aspirin Low Dose 81 MG EC tablet Take 81 mg by mouth Daily.   1/12/2021 at Unknown time   • buPROPion XL (Wellbutrin XL) 150 MG 24 hr tablet Take 1 tablet by mouth Daily. 30 tablet 12 1/13/2021 at Unknown time   • calcitriol (ROCALTROL) 0.5 MCG capsule Take 0.5 mcg by mouth  2 (Two) Times a Day.   1/12/2021 at Unknown time   • calcium carbonate (OS-TIMOTEO) 600 MG tablet Take 600 mg by mouth 2 (Two) Times a Day.   1/13/2021 at Unknown time   • cyclobenzaprine (FLEXERIL) 10 MG tablet Take 10 mg by mouth 3 (Three) Times a Day As Needed for Muscle Spasms.   Past Week at Unknown time   • levothyroxine (SYNTHROID, LEVOTHROID) 137 MCG tablet Take 137 mcg by mouth Daily.   1/13/2021 at Unknown time   • predniSONE (DELTASONE) 5 MG tablet Take 5 mg by mouth Daily.  0 1/13/2021 at Unknown time       Medicines:  Current Facility-Administered Medications   Medication Dose Route Frequency Provider Last Rate Last Admin   • acetaminophen (TYLENOL) tablet 650 mg  650 mg Oral Q4H PRN Moises Peterson APRN        Or   • acetaminophen (TYLENOL) 160 MG/5ML solution 650 mg  650 mg Oral Q4H PRN Moises Peterson APRN        Or   • acetaminophen (TYLENOL) suppository 650 mg  650 mg Rectal Q4H PRN Moises Peterson APRN       • aspirin EC tablet 81 mg  81 mg Oral Daily Moises Peterson APRN   81 mg at 01/15/21 0839   • clopidogrel (PLAVIX) tablet 75 mg  75 mg Oral Daily Moises Peterson APRN   75 mg at 01/15/21 0839   • [START ON 1/16/2021] furosemide (LASIX) injection 40 mg  40 mg Intravenous Daily Jose Elias Holliday APRN       • HYDROcodone-acetaminophen (NORCO) 5-325 MG per tablet 1 tablet  1 tablet Oral Q4H PRN Moises Peterson APRN       • levothyroxine (SYNTHROID, LEVOTHROID) tablet 137 mcg  137 mcg Oral Q AM Moises Peterson APRN   137 mcg at 01/15/21 0558   • ondansetron (ZOFRAN) tablet 4 mg  4 mg Oral Q6H PRN Moises Peterson APRN        Or   • ondansetron (ZOFRAN) injection 4 mg  4 mg Intravenous Q6H PRN Moises Peterson APRN   4 mg at 01/14/21 1800   • predniSONE (DELTASONE) tablet 5 mg  5 mg Oral Daily With Breakfast Moises Peterson APRN   5 mg at 01/15/21 0839   • promethazine (PHENERGAN) tablet 25 mg  25 mg Oral Q6H PRN Chas Navarro MD   25 mg at 01/14/21 2015   •  sennosides-docusate (PERICOLACE) 8.6-50 MG per tablet 2 tablet  2 tablet Oral BID Moises Peterson APRN   2 tablet at 01/15/21 0839   • sodium chloride 0.9 % flush 10 mL  10 mL Intravenous Q12H Moises Peterson APRN   10 mL at 01/14/21 2005   • sodium chloride 0.9 % flush 10 mL  10 mL Intravenous PRN Moises Peterson APRN       • sodium chloride 0.9 % infusion  75 mL/hr Intravenous Continuous Jose Elias Holliday APRN 75 mL/hr at 01/15/21 1520 75 mL/hr at 01/15/21 1520       Past Medical History:  Past Medical History:   Diagnosis Date   • Abnormal Pap smear of cervix    • Cervical dysplasia    • Disease of thyroid gland    • Thyroid cancer (CMS/HCC)        Past Surgical History:  Past Surgical History:   Procedure Laterality Date   • HYSTERECTOMY      without BSO   • KNEE ARTHROPLASTY     • LUNG SURGERY Right 12/02/2020    Stent placed in right lung   • TOTAL THYROIDECTOMY     • WISDOM TOOTH EXTRACTION         Family History  Family History   Problem Relation Age of Onset   • Prostate cancer Father 72   • Arthritis Mother    • Arthritis Brother    • Colon cancer Paternal Uncle 50   • Thyroid cancer Other    • No Known Problems Sister    • No Known Problems Daughter    • No Known Problems Son    • No Known Problems Maternal Grandmother    • No Known Problems Paternal Grandmother    • No Known Problems Maternal Aunt    • No Known Problems Paternal Aunt    • Breast cancer Neg Hx    • Ovarian cancer Neg Hx    • Uterine cancer Neg Hx    • Melanoma Neg Hx    • BRCA 1/2 Neg Hx    • Endometrial cancer Neg Hx        Social History  Social History     Socioeconomic History   • Marital status:      Spouse name: Not on file   • Number of children: Not on file   • Years of education: Not on file   • Highest education level: Not on file   Tobacco Use   • Smoking status: Never Smoker   • Smokeless tobacco: Never Used   Substance and Sexual Activity   • Alcohol use: Yes     Comment: occ   • Drug use: No   • Sexual  activity: Yes     Birth control/protection: Post-menopausal         Review of Systems:  History obtained from chart review and the patient  General ROS: No fever or chills  Respiratory ROS: No cough, shortness of breath, wheezing  Cardiovascular ROS: No chest pain or palpitations  Gastrointestinal ROS: No abdominal pain or melena  Genito-Urinary ROS: No dysuria or hematuria    Objective:  Patient Vitals for the past 24 hrs:   BP Temp Temp src Pulse Resp SpO2 Weight   01/15/21 1511 122/61 98.6 °F (37 °C) Oral 62 18 97 % --   01/15/21 1155 116/85 98.4 °F (36.9 °C) Oral 79 18 97 % --   01/15/21 0718 135/67 98 °F (36.7 °C) Oral 78 18 93 % --   01/15/21 0405 132/60 98.9 °F (37.2 °C) Oral 80 16 96 % --   01/14/21 2345 140/73 99 °F (37.2 °C) Oral 86 16 97 % --   01/14/21 1957 145/59 99 °F (37.2 °C) Oral 88 16 92 % 84 kg (185 lb 2 oz)   01/14/21 1628 -- 99 °F (37.2 °C) Oral -- -- -- --       Intake/Output Summary (Last 24 hours) at 1/15/2021 1623  Last data filed at 1/15/2021 0436  Gross per 24 hour   Intake 2040 ml   Output 1950 ml   Net 90 ml     General: awake/alert   Chest:  clear to auscultation bilaterally without respiratory distress  CVS: regular rate and rhythm  Abdominal: soft, nontender, positive bowel sounds  Extremities: no cyanosis or edema  Skin: warm and dry without rash      Labs:  Results from last 7 days   Lab Units 01/15/21  0404 01/14/21  0451 01/13/21  1214   WBC 10*3/mm3 10.19 11.00* 11.22*   HEMOGLOBIN g/dL 11.1* 10.6* 12.2   HEMATOCRIT % 32.2* 32.4* 35.1   PLATELETS 10*3/mm3 272 314 341         Results from last 7 days   Lab Units 01/15/21  1301 01/15/21  0404 01/14/21  0747 01/14/21  0451  01/13/21  1214   SODIUM mmol/L 141 141 142 140   < > 138   POTASSIUM mmol/L 3.2* 2.8* 3.3* 3.2*   < > 3.8   CHLORIDE mmol/L 102 104 103 102   < > 98   CO2 mmol/L 26.0 25.0 27.0 28.0   < > 29.0   BUN mg/dL 23* 22* 32* 32*   < > 36*   CREATININE mg/dL 3.00* 3.10* 3.52* 3.59*   < > 3.78*   CALCIUM mg/dL 9.6 9.7  12.0* 12.2*   < > 15.7*   BILIRUBIN mg/dL  --  0.2  --  0.2  --  0.2   ALK PHOS U/L  --  131*  --  135*  --  177*   ALT (SGPT) U/L  --  15  --  22  --  24   AST (SGOT) U/L  --  15  --  17  --  18   GLUCOSE mg/dL 73 88 90 117*   < > 94    < > = values in this interval not displayed.       Radiology:   Imaging Results (Last 72 Hours)     Procedure Component Value Units Date/Time    CT CHEST WITHOUT CONTRAST DIAGNOSTIC [[id number]] Collected: 01/13/21 2026     Updated: 01/13/21 2045    Narrative:      EXAMINATION: CT CHEST WO CONTRAST- 1/13/2021 8:26 PM CST     HISTORY: Interstitial lung disease, history of sarcoidosis and thyroid  carcinoma. Retrocrural lymphadenopathy. Fibrosing mediastinitis.     DOSE: 191 mGycm (Automatic exposure control technique was implemented in  an effort to keep the radiation dose as low as possible without  compromising image quality)     REPORT: Spiral CT of the chest was performed without intravenous  contrast from the thoracic inlet through the upper abdomen.  Reconstructed coronal and sagittal images are also reviewed.     Comparison: CT abdomen pelvis with contrast 12/30/2020. Chest x-rays  2/19/2016.     Review of lung windows demonstrates mild respiratory motion artifact.  There is mild hyperinflation lungs with mild emphysematous changes.  There is coarse thickening of interlobular septa in the lung bases,  greater right than likely related to chronic interstitial fibrosis and  the history of sarcoidosis. There is associated pleural thickening in  the left lateral lung base, with adjacent coarse calcifications. No lung  consolidation is identified to indicate acute pneumonia. There are  tubular structures and both lungs compatible with multiple dilated  pulmonary veins, which correspond with similar to prior structures on  the chest x-ray from 2016, gray similar in appearance. There is a stent  within the right middle lobe pulmonary vein. There are enlarged lymph  nodes with  extensive calcifications at the bilateral hilar, subcarinal  and right paratracheal ivonne stations, compatible with chronic  sarcoidosis. Patency of the vascular structures is not determined  without intravenous contrast. The central pulmonary arteries are dilated  compatible pulmonary arterial hypertension. For example, the main  pulmonary artery measures 4.4 cm in diameter. The thoracic aorta is  normal caliber. There is mildly increased attenuation of fat planes  within the mediastinum or diffusely compatible with the history of  fibrosing mediastinitis. Heart size is normal. There is trace  pericardial fluid. In the upper abdomen, there are coarse calcifications  within the cortex of both kidneys, greater on the left and likely  related to renal involvement by sarcoidosis. There is a enlarged  noncalcified lymph node to the right of the esophagus in the posterior  mediastinum axial image 118, this lymph node measures 1.3 cm. There is  right retrocrural lymphadenopathy, with a maximum diameter 10.8 mm.  There are normal sized lymph nodes in the upper retroperitoneum. Review  of bone windows is unremarkable.       Impression:      1. Enlarged coarsely calcified mediastinal and hilar lymph nodes  compatible with the history of sarcoidosis. There is also interstitial  thickening in the lungs which appears to be related to chronic fibrosis  related to sarcoid disease. No superimposed acute infiltrate or lung  consolidation is identified.  2. Dilated pulmonary arteries compatible with pulmonary arterial  hypertension. There is also evidence of chronic pulmonary venous  hypertension, with multiple dilated tortuous pulmonary veins within the  lungs bilaterally, and there is evidence of previous stent insertion in  one of the main right pulmonary veins inferior to the right hilum.  Increased attenuation and stranding of mediastinal fat planes is  compatible with the history of fibrosing mediastinitis.  3. There is a  small noncalcified lymph node in the posterior mediastinum  measuring 10 mm and a noncalcified 10 mm lymph node in the right  retrocrural space, compatible with mild retrocrural lymphadenopathy.  This may also be related to sarcoid disease.  4. Coarse calcifications within the kidneys bilaterally is most likely a  manifestation of renal sarcoidosis. There is no evidence of  hydronephrosis.              This report was finalized on 01/13/2021 20:42 by Dr. Chas Best MD.    US Renal Bilateral [[id number]] Collected: 01/13/21 1405     Updated: 01/13/21 1414    Narrative:      EXAM: US RENAL BILATERAL- - 1/13/2021 1:13 PM CST     HISTORY: acute kidney failure       COMPARISON: 12/30/2020.      TECHNIQUE: Real time grayscale and color ultrasound performed with  representative images saved to PACS.      FINDINGS:  AORTA. Normal caliber by ultrasound.      RIGHT KIDNEY. Measures 10.7 x 5.0 x 5.9 cm. Normal renal contour. There  are chunky shadowing echogenic foci involving the renal parenchyma. No  hydronephrosis.     LEFT KIDNEY. Measures 10.6 x 6.3 x 4.8 cm. Normal renal contour. There  are coarse chunky calcification. No hydronephrosis.     BLADDER. Under distended urinary bladder appears grossly normal.          Impression:      1. No hydronephrosis.  2. Redemonstration of bilateral renal parenchymal calcifications,  suggestive of nephrocalcinosis, which has a broad differential.  This report was finalized on 01/13/2021 14:11 by Dr Mounika Goodson MD.          Culture:  Urine Culture   Date Value Ref Range Status   01/11/2021 Final report  Final         Assessment   1.  Acute kidney injury, ATN--some improvement  2.  Severe hypercalcemia--improving. Zometa given 1/13  3.  Rule out lymphoproliferative disorder  4.  Sarcoidosis  5.  History of thyroid CA  6.  Hypokalemia.    Plan:  Continue IV saline. Follow up SPEP, ACE level and PTH rp. Avoid Ca and vit D suppl for now. Avoid hypotension and nephrotoxins. Replace  potassium.       Alfredo Loomis MD  1/15/2021  16:23 CST

## 2021-01-16 VITALS
HEART RATE: 59 BPM | BODY MASS INDEX: 29.89 KG/M2 | RESPIRATION RATE: 16 BRPM | WEIGHT: 185.13 LBS | DIASTOLIC BLOOD PRESSURE: 65 MMHG | SYSTOLIC BLOOD PRESSURE: 127 MMHG | OXYGEN SATURATION: 98 % | TEMPERATURE: 97.7 F

## 2021-01-16 LAB
ALBUMIN SERPL-MCNC: 3.9 G/DL (ref 3.5–5.2)
ALBUMIN/GLOB SERPL: 1 G/DL
ALP SERPL-CCNC: 133 U/L (ref 39–117)
ALT SERPL W P-5'-P-CCNC: 17 U/L (ref 1–33)
ANION GAP SERPL CALCULATED.3IONS-SCNC: 10 MMOL/L (ref 5–15)
AST SERPL-CCNC: 19 U/L (ref 1–32)
BASOPHILS # BLD AUTO: 0.1 10*3/MM3 (ref 0–0.2)
BASOPHILS NFR BLD AUTO: 0.9 % (ref 0–1.5)
BILIRUB SERPL-MCNC: 0.2 MG/DL (ref 0–1.2)
BUN SERPL-MCNC: 24 MG/DL (ref 6–20)
BUN/CREAT SERPL: 8.6 (ref 7–25)
CALCIUM SPEC-SCNC: 8.8 MG/DL (ref 8.6–10.5)
CHLORIDE SERPL-SCNC: 106 MMOL/L (ref 98–107)
CO2 SERPL-SCNC: 24 MMOL/L (ref 22–29)
CREAT SERPL-MCNC: 2.8 MG/DL (ref 0.57–1)
DEPRECATED RDW RBC AUTO: 40.2 FL (ref 37–54)
EOSINOPHIL # BLD AUTO: 0.38 10*3/MM3 (ref 0–0.4)
EOSINOPHIL NFR BLD AUTO: 3.5 % (ref 0.3–6.2)
ERYTHROCYTE [DISTWIDTH] IN BLOOD BY AUTOMATED COUNT: 12.5 % (ref 12.3–15.4)
GFR SERPL CREATININE-BSD FRML MDRD: 18 ML/MIN/1.73
GLOBULIN UR ELPH-MCNC: 3.8 GM/DL
GLUCOSE SERPL-MCNC: 85 MG/DL (ref 65–99)
HCT VFR BLD AUTO: 32.5 % (ref 34–46.6)
HGB BLD-MCNC: 11.3 G/DL (ref 12–15.9)
IMM GRANULOCYTES # BLD AUTO: 0.08 10*3/MM3 (ref 0–0.05)
IMM GRANULOCYTES NFR BLD AUTO: 0.7 % (ref 0–0.5)
LYMPHOCYTES # BLD AUTO: 2.3 10*3/MM3 (ref 0.7–3.1)
LYMPHOCYTES NFR BLD AUTO: 21.5 % (ref 19.6–45.3)
MCH RBC QN AUTO: 30.6 PG (ref 26.6–33)
MCHC RBC AUTO-ENTMCNC: 34.8 G/DL (ref 31.5–35.7)
MCV RBC AUTO: 88.1 FL (ref 79–97)
MONOCYTES # BLD AUTO: 1.58 10*3/MM3 (ref 0.1–0.9)
MONOCYTES NFR BLD AUTO: 14.7 % (ref 5–12)
NEUTROPHILS NFR BLD AUTO: 58.7 % (ref 42.7–76)
NEUTROPHILS NFR BLD AUTO: 6.28 10*3/MM3 (ref 1.7–7)
NRBC BLD AUTO-RTO: 0 /100 WBC (ref 0–0.2)
PLATELET # BLD AUTO: 277 10*3/MM3 (ref 140–450)
PMV BLD AUTO: 9.5 FL (ref 6–12)
POTASSIUM SERPL-SCNC: 3.1 MMOL/L (ref 3.5–5.2)
PROT SERPL-MCNC: 7.7 G/DL (ref 6–8.5)
RBC # BLD AUTO: 3.69 10*6/MM3 (ref 3.77–5.28)
SODIUM SERPL-SCNC: 140 MMOL/L (ref 136–145)
WBC # BLD AUTO: 10.72 10*3/MM3 (ref 3.4–10.8)

## 2021-01-16 PROCEDURE — 63710000001 PREDNISONE PER 5 MG: Performed by: NURSE PRACTITIONER

## 2021-01-16 PROCEDURE — 80053 COMPREHEN METABOLIC PANEL: CPT | Performed by: FAMILY MEDICINE

## 2021-01-16 PROCEDURE — 85025 COMPLETE CBC W/AUTO DIFF WBC: CPT | Performed by: FAMILY MEDICINE

## 2021-01-16 RX ORDER — CLOPIDOGREL BISULFATE 75 MG/1
75 TABLET ORAL DAILY
Qty: 30 TABLET
Start: 2021-01-16 | End: 2021-01-16

## 2021-01-16 RX ORDER — CLOPIDOGREL BISULFATE 75 MG/1
75 TABLET ORAL DAILY
Qty: 30 TABLET
Start: 2021-01-16 | End: 2021-04-16

## 2021-01-16 RX ADMIN — CLOPIDOGREL 75 MG: 75 TABLET, FILM COATED ORAL at 08:01

## 2021-01-16 RX ADMIN — LEVOTHYROXINE SODIUM 137 MCG: 112 TABLET ORAL at 08:01

## 2021-01-16 RX ADMIN — ASPIRIN 81 MG: 81 TABLET, FILM COATED ORAL at 08:01

## 2021-01-16 RX ADMIN — DOCUSATE SODIUM 50 MG AND SENNOSIDES 8.6 MG 2 TABLET: 8.6; 5 TABLET, FILM COATED ORAL at 08:01

## 2021-01-16 RX ADMIN — PREDNISONE 5 MG: 5 TABLET ORAL at 08:01

## 2021-01-16 NOTE — DISCHARGE SUMMARY
Hospital Discharge Summary    Ines Rowland  :  1970  MRN:  [id number]    Admit date:  2021  Discharge date:      Admitting Physician:    Chas Navarro MD    Discharge Diagnoses:      Thyroid cancer (CMS/HCC)    Sarcoidosis    Fibrosing mediastinitis    RUSSELL (acute kidney injury) (CMS/HCC)    Hypercalcemia      Hospital Course:   The patient was admitted for the above surgical/medical indication.  Please see admission H&P for further details concerning the admission.  The patient was seen daily and progress noted via daily updates in the progress notes.  The patient improved throughout her stay.  They reached maximum medical improvement and were considered stable for discharge home.  They understand the importance of follow-up concerning any abnormal lab values/x-rays.  All questions were answered to the best of my ability prior to their discharge home.    The patient is a 50-year-old  female who was initially seen in the office on 2021 with abnormal labs from a local GYN.  Patient was found to have a creatinine greater than 3.5.  Patient has a known history of issues with sarcoidosis and lung issues.  She recently was at [facility], [place], operated by [facility] for pulmonary vein stent via heart cath.  In 2020 her creatinine was found to be 1.51.  Apparently on  she had a CT scan that revealed a POC creatinine of greater than 3.  However it seems that she subsequently was still administered IV contrast for her CT scan.  Patient has not had any oliguria.  She was also found to be severely hypercalcemic.  She was admitted and treated with IV fluids, calcitonin and Zometa.  She was seen by nephrology and hematology/oncology.  Protein electrophoresis appear to be normal.  Creatinine is trending down, today is 2.8.  Feel the patient is stable for discharge to follow-up in our office on Monday with repeat labs.  She will also have follow-up appointment with nephrology next week as  well.  She will be discharged home today in stable condition to push fluids with repeat labs.      Discharge Medications:         Discharge Medications      New Medications      Instructions Start Date   clopidogrel 75 MG tablet  Commonly known as: PLAVIX   75 mg, Oral, Daily         Continue These Medications      Instructions Start Date   albuterol sulfate  (90 Base) MCG/ACT inhaler  Commonly known as: PROVENTIL HFA;VENTOLIN HFA;PROAIR HFA   2 puffs, Inhalation, Every 4 Hours PRN      ALPRAZolam 0.25 MG tablet  Commonly known as: XANAX   0.25 mg, Oral, 3 Times Daily PRN      Aspirin Low Dose 81 MG EC tablet  Generic drug: aspirin   81 mg, Oral, Daily      buPROPion  MG 24 hr tablet  Commonly known as: Wellbutrin XL   150 mg, Oral, Daily      cyclobenzaprine 10 MG tablet  Commonly known as: FLEXERIL   10 mg, Oral, 3 Times Daily PRN      levothyroxine 137 MCG tablet  Commonly known as: SYNTHROID, LEVOTHROID   137 mcg, Oral, Daily      predniSONE 5 MG tablet  Commonly known as: DELTASONE   5 mg, Oral, Daily         Stop These Medications    calcitriol 0.5 MCG capsule  Commonly known as: ROCALTROL     calcium carbonate 600 MG tablet  Commonly known as: OS-TIMOTEO          Consults:   1.  Nephrology, Dr. Loomis  2.  Hematology/oncology, Dr. Kim    Significant Diagnostic Studies:      Lab Results (last 48 hours)     Procedure Component Value Units Date/Time    Comprehensive Metabolic Panel [[id number]]  (Abnormal) Collected: 01/16/21 0647    Specimen: Blood Updated: 01/16/21 0724     Glucose 85 mg/dL      BUN 24 mg/dL      Creatinine 2.80 mg/dL      Sodium 140 mmol/L      Potassium 3.1 mmol/L      Comment: Slight hemolysis detected by analyzer. Results may be affected.        Chloride 106 mmol/L      CO2 24.0 mmol/L      Calcium 8.8 mg/dL      Total Protein 7.7 g/dL      Albumin 3.90 g/dL      ALT (SGPT) 17 U/L      AST (SGOT) 19 U/L      Comment: Slight hemolysis detected by analyzer. Results may be affected.         Alkaline Phosphatase 133 U/L      Total Bilirubin 0.2 mg/dL      eGFR Non African Amer 18 mL/min/1.73      Globulin 3.8 gm/dL      A/G Ratio 1.0 g/dL      BUN/Creatinine Ratio 8.6     Anion Gap 10.0 mmol/L     Narrative:      GFR Normal >60  Chronic Kidney Disease <60  Kidney Failure <15      CBC & Differential [[id number]]  (Abnormal) Collected: 01/16/21 0647    Specimen: Blood Updated: 01/16/21 0656    Narrative:      The following orders were created for panel order CBC & Differential.  Procedure                               Abnormality         Status                     ---------                               -----------         ------                     CBC Auto Differential[[id number]]        Abnormal            Final result                 Please view results for these tests on the individual orders.    CBC Auto Differential [[id number]]  (Abnormal) Collected: 01/16/21 0647    Specimen: Blood Updated: 01/16/21 0656     WBC 10.72 10*3/mm3      RBC 3.69 10*6/mm3      Hemoglobin 11.3 g/dL      Hematocrit 32.5 %      MCV 88.1 fL      MCH 30.6 pg      MCHC 34.8 g/dL      RDW 12.5 %      RDW-SD 40.2 fl      MPV 9.5 fL      Platelets 277 10*3/mm3      Neutrophil % 58.7 %      Lymphocyte % 21.5 %      Monocyte % 14.7 %      Eosinophil % 3.5 %      Basophil % 0.9 %      Immature Grans % 0.7 %      Neutrophils, Absolute 6.28 10*3/mm3      Lymphocytes, Absolute 2.30 10*3/mm3      Monocytes, Absolute 1.58 10*3/mm3      Eosinophils, Absolute 0.38 10*3/mm3      Basophils, Absolute 0.10 10*3/mm3      Immature Grans, Absolute 0.08 10*3/mm3      nRBC 0.0 /100 WBC     Protein Elec + Interp, Serum [[id number]] Collected: 01/13/21 1617    Specimen: Blood Updated: 01/15/21 1512     Total Protein 7.3 g/dL      Albumin 3.5 g/dL      Alpha-1-Globulin 0.3 g/dL      Alpha-2-Globulin 1.0 g/dL      Beta Globulin 1.1 g/dL      Gamma Globulin 1.3 g/dL      M-Timi Not Observed g/dL      Globulin 3.8 g/dL      A/G Ratio 0.9     Please  note Comment     Comment: Protein electrophoresis scan will follow via computer, mail, or   delivery.        SPE Interpretation Comment     Comment: The SPE pattern appears unremarkable. Evidence of monoclonal  protein is not apparent.       Narrative:      Performed at:  [address] [facility]  [id number]  : Doug Frias PhD, Phone:  [phone]    Basic Metabolic Panel [[id number]]  (Abnormal) Collected: 01/15/21 1301    Specimen: Blood Updated: 01/15/21 1509     Glucose 73 mg/dL      BUN 23 mg/dL      Creatinine 3.00 mg/dL      Sodium 141 mmol/L      Potassium 3.2 mmol/L      Chloride 102 mmol/L      CO2 26.0 mmol/L      Calcium 9.6 mg/dL      eGFR Non African Amer 17 mL/min/1.73      BUN/Creatinine Ratio 7.7     Anion Gap 13.0 mmol/L     Narrative:      GFR Normal >60  Chronic Kidney Disease <60  Kidney Failure <15      Angiotensin Converting Enzyme [[id number]] Collected: 01/13/21 2320    Specimen: Blood Updated: 01/15/21 1210     Angiotensin Converting Enzyme 28 U/L     Narrative:      Performed at:  [address] [facility]  [id number]  : Doug Frias PhD, Phone:  [phone]    Magnesium [[id number]]  (Normal) Collected: 01/15/21 0404    Specimen: Blood Updated: 01/15/21 0848     Magnesium 1.6 mg/dL     Comprehensive Metabolic Panel [[id number]]  (Abnormal) Collected: 01/15/21 0404    Specimen: Blood Updated: 01/15/21 0508     Glucose 88 mg/dL      BUN 22 mg/dL      Creatinine 3.10 mg/dL      Sodium 141 mmol/L      Potassium 2.8 mmol/L      Chloride 104 mmol/L      CO2 25.0 mmol/L      Calcium 9.7 mg/dL      Total Protein 7.4 g/dL      Albumin 3.90 g/dL      ALT (SGPT) 15 U/L      AST (SGOT) 15 U/L      Alkaline Phosphatase 131 U/L      Total Bilirubin 0.2 mg/dL      eGFR Non African Amer 16 mL/min/1.73      Globulin 3.5 gm/dL      A/G Ratio 1.1 g/dL      BUN/Creatinine Ratio 7.1     Anion Gap 12.0 mmol/L     Narrative:       GFR Normal >60  Chronic Kidney Disease <60  Kidney Failure <15      TSH [[id number]]  (Normal) Collected: 01/15/21 0404    Specimen: Blood Updated: 01/15/21 0508     TSH 1.730 uIU/mL     T4, Free [[id number]]  (Normal) Collected: 01/15/21 0404    Specimen: Blood Updated: 01/15/21 0508     Free T4 1.60 ng/dL     Narrative:      Results may be falsely increased if patient taking Biotin.      CBC & Differential [[id number]]  (Abnormal) Collected: 01/15/21 0404    Specimen: Blood Updated: 01/15/21 0439    Narrative:      The following orders were created for panel order CBC & Differential.  Procedure                               Abnormality         Status                     ---------                               -----------         ------                     CBC Auto Differential[[id number]]        Abnormal            Final result                 Please view results for these tests on the individual orders.    CBC Auto Differential [[id number]]  (Abnormal) Collected: 01/15/21 0404    Specimen: Blood Updated: 01/15/21 0439     WBC 10.19 10*3/mm3      RBC 3.66 10*6/mm3      Hemoglobin 11.1 g/dL      Hematocrit 32.2 %      MCV 88.0 fL      MCH 30.3 pg      MCHC 34.5 g/dL      RDW 12.4 %      RDW-SD 39.9 fl      MPV 9.6 fL      Platelets 272 10*3/mm3      Neutrophil % 67.9 %      Lymphocyte % 14.0 %      Monocyte % 14.4 %      Eosinophil % 2.1 %      Basophil % 0.8 %      Immature Grans % 0.8 %      Neutrophils, Absolute 6.92 10*3/mm3      Lymphocytes, Absolute 1.43 10*3/mm3      Monocytes, Absolute 1.47 10*3/mm3      Eosinophils, Absolute 0.21 10*3/mm3      Basophils, Absolute 0.08 10*3/mm3      Immature Grans, Absolute 0.08 10*3/mm3      nRBC 0.0 /100 WBC     Vitamin D 25 Hydroxy [[id number]]  (Normal) Collected: 01/14/21 0451    Specimen: Blood Updated: 01/14/21 1225     25 Hydroxy, Vitamin D 41.3 ng/ml     Narrative:      Reference Range for Total Vitamin D 25(OH)     Deficiency <20.0 ng/mL   Insufficiency 21-29 ng/mL    Sufficiency  ng/mL  Toxicity >100 ng/ml    Results may be falsely increased if patient taking Biotin.      Basic Metabolic Panel [[id number]]  (Abnormal) Collected: 01/14/21 0747    Specimen: Blood Updated: 01/14/21 0847     Glucose 90 mg/dL      BUN 32 mg/dL      Creatinine 3.52 mg/dL      Sodium 142 mmol/L      Potassium 3.3 mmol/L      Chloride 103 mmol/L      CO2 27.0 mmol/L      Calcium 12.0 mg/dL      eGFR   Amer --     Comment: <15 Indicative of kidney failure.        eGFR Non African Amer 14 mL/min/1.73      Comment: <15 Indicative of kidney failure.        BUN/Creatinine Ratio 9.1     Anion Gap 12.0 mmol/L     Narrative:      GFR Normal >60  Chronic Kidney Disease <60  Kidney Failure <15      Reticulocytes [[id number]]  (Normal) Collected: 01/14/21 0451    Specimen: Blood Updated: 01/14/21 0741     Reticulocyte % 1.30 %      Reticulocyte Absolute 0.0458 10*6/mm3         Treatments:     1.  IV fluids, Zometa, calcitonin    Disposition: Stable  Follow up with Chas Navarro MD on Monday in the office.    Signed:  ORACIO Taylor   1/16/2021, 07:36 CST    Extended conference with patient and  at bedside.  Reviewed email from her doctor in [place].  Medically stable for discharge home.  Follow-up Monday in office with ORACIO Jacobson to recheck creatinine.      I have discussed the care of Ines Rowland, including pertinent history and exam findings with the ARNP/PA.  I have seen and examined the patient and the key elements of all parts of the encounter have been performed by me. I agree with the assessment and plan as outlined by the ARNP/PA. Please refer to my comments for complete documentation.     Electronically signed by Chas Navarro MD on 1/16/2021 at 07:57 CST

## 2021-01-17 ENCOUNTER — READMISSION MANAGEMENT (OUTPATIENT)
Dept: CALL CENTER | Facility: HOSPITAL | Age: 51
End: 2021-01-17

## 2021-01-17 NOTE — OUTREACH NOTE
Prep Survey      Responses   Jew facility patient discharged from?  [facility]   Is LACE score < 7 ?  No   Emergency Room discharge w/ pulse ox?  No   Eligibility  Readm Mgmt   Discharge diagnosis  RUSSELL (acute kidney injury   Does the patient have one of the following disease processes/diagnoses(primary or secondary)?  Other   Does the patient have Home health ordered?  No   Is there a DME ordered?  No   Medication alerts for this patient  Plavix    Prep survey completed?  Yes          Ida New RN

## 2021-01-17 NOTE — PAYOR COMM NOTE
"NE HOME 21  [id number]    Ines Carter (50 y.o. Female)     Date of Birth Social Security Number Address Home Phone MRN    1970  [address] [facility] [address]  [id number]    Faith Marital Status          Christianity        Admission Date Admission Type Admitting Provider Attending Provider Department, Room/Bed    21 Emergency Chas Navarro MD  [facility] 4B, 443/1    Discharge Date Discharge Disposition Discharge Destination        2021 Home or Self Care              Attending Provider: (none)   Allergies: No Known Allergies    Isolation: None   Infection: None   Code Status: Prior    Ht: 167.6 cm (65.98\")   Wt: 84 kg (185 lb 2 oz)    Admission Cmt: None   Principal Problem: None                Active Insurance as of 2021     Primary Coverage     Payor Plan Insurance Group Employer/Plan Group    [employer]UE [employer]OSS [employer]UE [employer] PPO [id number]     Payor Plan Address Payor Plan Phone Number Payor Plan Fax Number Effective Dates    [address] [phone]  2019 - None Entered    [place] [address]       Subscriber Name Subscriber Birth Date Member ID       INES CARTER 1970 [id number]                 Emergency Contacts      (Rel.) Home Phone Work Phone Mobile Phone    Marcello Carter (Spouse) -- -- [phone]               Discharge Summary      Chas Navarro MD at 21 0736          Hospital Discharge Summary    Ines Carter  :  1970  MRN:  [id number]    Admit date:  2021  Discharge date:      Admitting Physician:    Chas Navarro MD    Discharge Diagnoses:      Thyroid cancer (CMS/HCC)    Sarcoidosis    Fibrosing mediastinitis    RUSSELL (acute kidney injury) (CMS/HCC)    Hypercalcemia      Hospital Course:   The patient was admitted for the above surgical/medical indication.  Please see admission H&P for further details concerning the admission.  The patient " was seen daily and progress noted via daily updates in the progress notes.  The patient improved throughout her stay.  They reached maximum medical improvement and were considered stable for discharge home.  They understand the importance of follow-up concerning any abnormal lab values/x-rays.  All questions were answered to the best of my ability prior to their discharge home.    The patient is a 50-year-old  female who was initially seen in the office on 1/13/2021 with abnormal labs from a local GYN.  Patient was found to have a creatinine greater than 3.5.  Patient has a known history of issues with sarcoidosis and lung issues.  She recently was at [facility] for pulmonary vein stent via heart cath.  In October 2020 her creatinine was found to be 1.51.  Apparently on December 28 she had a CT scan that revealed a POC creatinine of greater than 3.  However it seems that she subsequently was still administered IV contrast for her CT scan.  Patient has not had any oliguria.  She was also found to be severely hypercalcemic.  She was admitted and treated with IV fluids, calcitonin and Zometa.  She was seen by nephrology and hematology/oncology.  Protein electrophoresis appear to be normal.  Creatinine is trending down, today is 2.8.  Feel the patient is stable for discharge to follow-up in our office on Monday with repeat labs.  She will also have follow-up appointment with nephrology next week as well.  She will be discharged home today in stable condition to push fluids with repeat labs.      Discharge Medications:         Discharge Medications      New Medications      Instructions Start Date   clopidogrel 75 MG tablet  Commonly known as: PLAVIX   75 mg, Oral, Daily         Continue These Medications      Instructions Start Date   albuterol sulfate  (90 Base) MCG/ACT inhaler  Commonly known as: PROVENTIL HFA;VENTOLIN HFA;PROAIR HFA   2 puffs, Inhalation, Every 4 Hours PRN      ALPRAZolam  0.25 MG tablet  Commonly known as: XANAX   0.25 mg, Oral, 3 Times Daily PRN      Aspirin Low Dose 81 MG EC tablet  Generic drug: aspirin   81 mg, Oral, Daily      buPROPion  MG 24 hr tablet  Commonly known as: Wellbutrin XL   150 mg, Oral, Daily      cyclobenzaprine 10 MG tablet  Commonly known as: FLEXERIL   10 mg, Oral, 3 Times Daily PRN      levothyroxine 137 MCG tablet  Commonly known as: SYNTHROID, LEVOTHROID   137 mcg, Oral, Daily      predniSONE 5 MG tablet  Commonly known as: DELTASONE   5 mg, Oral, Daily         Stop These Medications    calcitriol 0.5 MCG capsule  Commonly known as: ROCALTROL     calcium carbonate 600 MG tablet  Commonly known as: OS-TIMOTEO          Consults:   1.  Nephrology, Dr. Loomis  2.  Hematology/oncology, Dr. Kim    Significant Diagnostic Studies:      Lab Results (last 48 hours)     Procedure Component Value Units Date/Time    Comprehensive Metabolic Panel [[id number]]  (Abnormal) Collected: 01/16/21 0647    Specimen: Blood Updated: 01/16/21 0724     Glucose 85 mg/dL      BUN 24 mg/dL      Creatinine 2.80 mg/dL      Sodium 140 mmol/L      Potassium 3.1 mmol/L      Comment: Slight hemolysis detected by analyzer. Results may be affected.        Chloride 106 mmol/L      CO2 24.0 mmol/L      Calcium 8.8 mg/dL      Total Protein 7.7 g/dL      Albumin 3.90 g/dL      ALT (SGPT) 17 U/L      AST (SGOT) 19 U/L      Comment: Slight hemolysis detected by analyzer. Results may be affected.        Alkaline Phosphatase 133 U/L      Total Bilirubin 0.2 mg/dL      eGFR Non African Amer 18 mL/min/1.73      Globulin 3.8 gm/dL      A/G Ratio 1.0 g/dL      BUN/Creatinine Ratio 8.6     Anion Gap 10.0 mmol/L     Narrative:      GFR Normal >60  Chronic Kidney Disease <60  Kidney Failure <15      CBC & Differential [[id number]]  (Abnormal) Collected: 01/16/21 0647    Specimen: Blood Updated: 01/16/21 0656    Narrative:      The following orders were created for panel order CBC &  Differential.  Procedure                               Abnormality         Status                     ---------                               -----------         ------                     CBC Auto Differential[[id number]]        Abnormal            Final result                 Please view results for these tests on the individual orders.    CBC Auto Differential [[id number]]  (Abnormal) Collected: 01/16/21 0647    Specimen: Blood Updated: 01/16/21 0656     WBC 10.72 10*3/mm3      RBC 3.69 10*6/mm3      Hemoglobin 11.3 g/dL      Hematocrit 32.5 %      MCV 88.1 fL      MCH 30.6 pg      MCHC 34.8 g/dL      RDW 12.5 %      RDW-SD 40.2 fl      MPV 9.5 fL      Platelets 277 10*3/mm3      Neutrophil % 58.7 %      Lymphocyte % 21.5 %      Monocyte % 14.7 %      Eosinophil % 3.5 %      Basophil % 0.9 %      Immature Grans % 0.7 %      Neutrophils, Absolute 6.28 10*3/mm3      Lymphocytes, Absolute 2.30 10*3/mm3      Monocytes, Absolute 1.58 10*3/mm3      Eosinophils, Absolute 0.38 10*3/mm3      Basophils, Absolute 0.10 10*3/mm3      Immature Grans, Absolute 0.08 10*3/mm3      nRBC 0.0 /100 WBC     Protein Elec + Interp, Serum [[id number]] Collected: 01/13/21 1617    Specimen: Blood Updated: 01/15/21 1512     Total Protein 7.3 g/dL      Albumin 3.5 g/dL      Alpha-1-Globulin 0.3 g/dL      Alpha-2-Globulin 1.0 g/dL      Beta Globulin 1.1 g/dL      Gamma Globulin 1.3 g/dL      M-Timi Not Observed g/dL      Globulin 3.8 g/dL      A/G Ratio 0.9     Please note Comment     Comment: Protein electrophoresis scan will follow via computer, mail, or   delivery.        SPE Interpretation Comment     Comment: The SPE pattern appears unremarkable. Evidence of monoclonal  protein is not apparent.       Narrative:      Performed at:  [address] [place] [address]  [id number]  : Doug Frias PhD, Phone:  [phone]    Basic Metabolic Panel [[id number]]  (Abnormal) Collected: 01/15/21 1301    Specimen:  Blood Updated: 01/15/21 1509     Glucose 73 mg/dL      BUN 23 mg/dL      Creatinine 3.00 mg/dL      Sodium 141 mmol/L      Potassium 3.2 mmol/L      Chloride 102 mmol/L      CO2 26.0 mmol/L      Calcium 9.6 mg/dL      eGFR Non African Amer 17 mL/min/1.73      BUN/Creatinine Ratio 7.7     Anion Gap 13.0 mmol/L     Narrative:      GFR Normal >60  Chronic Kidney Disease <60  Kidney Failure <15      Angiotensin Converting Enzyme [[id number]] Collected: 01/13/21 2320    Specimen: Blood Updated: 01/15/21 1210     Angiotensin Converting Enzyme 28 U/L     Narrative:      Performed at:  [facility] [id number]  : Doug Frias PhD, Phone:  [phone]    Magnesium [[id number]]  (Normal) Collected: 01/15/21 0404    Specimen: Blood Updated: 01/15/21 0848     Magnesium 1.6 mg/dL     Comprehensive Metabolic Panel [[id number]]  (Abnormal) Collected: 01/15/21 0404    Specimen: Blood Updated: 01/15/21 0508     Glucose 88 mg/dL      BUN 22 mg/dL      Creatinine 3.10 mg/dL      Sodium 141 mmol/L      Potassium 2.8 mmol/L      Chloride 104 mmol/L      CO2 25.0 mmol/L      Calcium 9.7 mg/dL      Total Protein 7.4 g/dL      Albumin 3.90 g/dL      ALT (SGPT) 15 U/L      AST (SGOT) 15 U/L      Alkaline Phosphatase 131 U/L      Total Bilirubin 0.2 mg/dL      eGFR Non African Amer 16 mL/min/1.73      Globulin 3.5 gm/dL      A/G Ratio 1.1 g/dL      BUN/Creatinine Ratio 7.1     Anion Gap 12.0 mmol/L     Narrative:      GFR Normal >60  Chronic Kidney Disease <60  Kidney Failure <15      TSH [[id number]]  (Normal) Collected: 01/15/21 0404    Specimen: Blood Updated: 01/15/21 0508     TSH 1.730 uIU/mL     T4, Free [[id number]]  (Normal) Collected: 01/15/21 0404    Specimen: Blood Updated: 01/15/21 0508     Free T4 1.60 ng/dL     Narrative:      Results may be falsely increased if patient taking Biotin.      CBC & Differential [[id number]]  (Abnormal) Collected: 01/15/21 0404    Specimen: Blood  Updated: 01/15/21 0439    Narrative:      The following orders were created for panel order CBC & Differential.  Procedure                               Abnormality         Status                     ---------                               -----------         ------                     CBC Auto Differential[[id number]]        Abnormal            Final result                 Please view results for these tests on the individual orders.    CBC Auto Differential [[id number]]  (Abnormal) Collected: 01/15/21 0404    Specimen: Blood Updated: 01/15/21 0439     WBC 10.19 10*3/mm3      RBC 3.66 10*6/mm3      Hemoglobin 11.1 g/dL      Hematocrit 32.2 %      MCV 88.0 fL      MCH 30.3 pg      MCHC 34.5 g/dL      RDW 12.4 %      RDW-SD 39.9 fl      MPV 9.6 fL      Platelets 272 10*3/mm3      Neutrophil % 67.9 %      Lymphocyte % 14.0 %      Monocyte % 14.4 %      Eosinophil % 2.1 %      Basophil % 0.8 %      Immature Grans % 0.8 %      Neutrophils, Absolute 6.92 10*3/mm3      Lymphocytes, Absolute 1.43 10*3/mm3      Monocytes, Absolute 1.47 10*3/mm3      Eosinophils, Absolute 0.21 10*3/mm3      Basophils, Absolute 0.08 10*3/mm3      Immature Grans, Absolute 0.08 10*3/mm3      nRBC 0.0 /100 WBC     Vitamin D 25 Hydroxy [[id number]]  (Normal) Collected: 01/14/21 0451    Specimen: Blood Updated: 01/14/21 1225     25 Hydroxy, Vitamin D 41.3 ng/ml     Narrative:      Reference Range for Total Vitamin D 25(OH)     Deficiency <20.0 ng/mL   Insufficiency 21-29 ng/mL   Sufficiency  ng/mL  Toxicity >100 ng/ml    Results may be falsely increased if patient taking Biotin.      Basic Metabolic Panel [[id number]]  (Abnormal) Collected: 01/14/21 0747    Specimen: Blood Updated: 01/14/21 0847     Glucose 90 mg/dL      BUN 32 mg/dL      Creatinine 3.52 mg/dL      Sodium 142 mmol/L      Potassium 3.3 mmol/L      Chloride 103 mmol/L      CO2 27.0 mmol/L      Calcium 12.0 mg/dL      eGFR   Amer --     Comment: <15 Indicative of kidney  failure.        eGFR Non African Amer 14 mL/min/1.73      Comment: <15 Indicative of kidney failure.        BUN/Creatinine Ratio 9.1     Anion Gap 12.0 mmol/L     Narrative:      GFR Normal >60  Chronic Kidney Disease <60  Kidney Failure <15      Reticulocytes [[id number]]  (Normal) Collected: 01/14/21 0451    Specimen: Blood Updated: 01/14/21 0741     Reticulocyte % 1.30 %      Reticulocyte Absolute 0.0458 10*6/mm3         Treatments:     1.  IV fluids, Zometa, calcitonin    Disposition: Stable  Follow up with Chas Navarro MD on Monday in the office.    Signed:  ORACIO Taylor   1/16/2021, 07:36 CST    Extended conference with patient and  at bedside.  Reviewed email from her doctor in [place].  Medically stable for discharge home.  Follow-up Monday in office with ORACIO Jacobson to recheck creatinine.      I have discussed the care of Ines Rowland, including pertinent history and exam findings with the ARNP/PA.  I have seen and examined the patient and the key elements of all parts of the encounter have been performed by me. I agree with the assessment and plan as outlined by the ARNP/PA. Please refer to my comments for complete documentation.     Electronically signed by Chas Navarro MD on 1/16/2021 at 07:57 CST      Electronically signed by Chas Navarro MD at 01/16/21 3747

## 2021-01-18 ENCOUNTER — TELEPHONE (OUTPATIENT)
Dept: ONCOLOGY | Facility: CLINIC | Age: 51
End: 2021-01-18

## 2021-01-18 ENCOUNTER — HOSPITAL ENCOUNTER (OUTPATIENT)
Dept: MAMMOGRAPHY | Facility: HOSPITAL | Age: 51
Discharge: HOME | End: 2021-01-18
Admitting: NURSE PRACTITIONER

## 2021-01-18 DIAGNOSIS — Z12.31 ENCOUNTER FOR SCREENING MAMMOGRAM FOR BREAST CANCER: ICD-10-CM

## 2021-01-18 PROCEDURE — 77067 SCR MAMMO BI INCL CAD: CPT

## 2021-01-18 PROCEDURE — 77063 BREAST TOMOSYNTHESIS BI: CPT

## 2021-01-18 NOTE — TELEPHONE ENCOUNTER
Caller: RAFAEL    Relationship to patient:      Best call back number: [phone]    PT NEEDS TO SCHED 2 [facility] FU WITH OFFICE.  HUB UNABLE TO SCHED AND UNSURE IF PT REQUIRES REFERRAL.    PLEASE SCHED AND CALL PT.

## 2021-01-20 ENCOUNTER — READMISSION MANAGEMENT (OUTPATIENT)
Dept: CALL CENTER | Facility: HOSPITAL | Age: 51
End: 2021-01-20

## 2021-01-20 NOTE — OUTREACH NOTE
Medical Week 1 Survey      Responses   [facility] patient discharged from?  [place]   Does the patient have one of the following disease processes/diagnoses(primary or secondary)?  Other   Week 1 attempt successful?  No   Unsuccessful attempts  Attempt 1          Ximena [place] RN

## 2021-01-22 ENCOUNTER — READMISSION MANAGEMENT (OUTPATIENT)
Dept: CALL CENTER | Facility: HOSPITAL | Age: 51
End: 2021-01-22

## 2021-01-23 LAB — PTH RELATED PROT SERPL-SCNC: <2 PMOL/L

## 2021-01-23 NOTE — OUTREACH NOTE
Medical Week 1 Survey      Responses   [facility] patient discharged from?  [place]   Does the patient have one of the following disease processes/diagnoses(primary or secondary)?  Other   Week 1 attempt successful?  Yes   Call start time  1837   Revoke  Decline to participate   Call end time  1837          Corina Toribio, RN

## 2021-02-08 NOTE — PROGRESS NOTES
[facility] GROUP HEMATOLOGY AND ONCOLOGY  [address]  [facility] [address] [phone]    Patient Name: Ines Rowland  Encounter Date: 02/15/2021  YOB: 1970  Patient Number: [id number]      Telephone visit. Patient given verbal consent for telephone visit.      REASON FOR FOLLOW-UP: Ines Rowland is a pleasant 50 y.o.  female on follow-up for lymphadenopathy, left cardiac and right retrocrural and hypercalcemia from from sarcoidosis.  She is also followed for normocytic anemia from chronic disease.  She is seen alone.  She is a reliable historian.        DIAGNOSTIC ABNORMALITIES:  CT of the chest 08/10/2020 [place].  Overall stable exam compared to 12/23/2019 with scattered calcified granulomas and extensive calcified mediastinal fibrosis that results in chronic narrowing of the central airways and pulmonary vasculature.  Extensive arterial collaterals from the right upper lobe supplying the right mid and lower lobes and from the left lower lobe supplying the left upper lobe.  Extensive right venous collaterals are also noted.  Interseptal lobar thickening and groundglass opacities in the right mid and lower and left upper lobes likely represent sarcoidosis related interstitial lung disease.  She presented with right lower quadrant abdominal pain with nausea and vomiting since Christmas eve.  She had seen ORACIO Awan.  Transvaginal ultrasound 12/20/2020. The uterus is absent.  The left ovary is not visualized.  The right ovary appears normal and is without cysts or masses.  Gallbladder ultrasound 12/29/2020.  Unremarkable ultrasound the gallbladder, liver and biliary system.  Several shadowing cortical calcifications within the right kidney.  Hepatobiliary scan 12/29/2020.The gallbladder ejection fraction equals 30%, which is in the normal range.  CT abdomen and pelvis 12/30/2020. Right lower lobe  groundglass opacities with tortuosity of the right lower lobe vasculature, incompletely visualized and characterized on this exam. Consider CT chest for further evaluation.  Enlarged left cardiac and right retrocrural lymph nodes, which could  be further evaluated on the above recommended CT chest. Calcifications in the left greater than right kidney suggesting nephrocalcinosis.  CT scan was reviewed with radiology and adenopathies measured at 7 mm.  Not accessible for needle biopsy.  CBC on 11/13/2021 remarkable for WBC 11.2 with ANC of 8.2.  CMP remarkable for BUN 36, creatinine 3.7, calcium 15.7, protein 9, alkaline phosphatase 177 and GFR 13 mL/min.  Iron saturation 19% and ferritin 271.7.  B12 pending.  Intact PTH 1.4.  PTH RP pending.  Ionized calcium greater than 7.2. Urinalysis on admission trace protein and trace leukocyte esterase.  Urine sodium 71, total protein 13.8, and osmolarity 243.  RBC 0-2, WBC 3-5 and squamous epithelial cells 3-6.  Renal ultrasound 01/13/2021 showed no hydronephrosis.  Redemonstration of bilateral renal parenchymal calcifications suggestive of nephro calcinosis.  CT chest 01/13/2021. Enlarged coarsely calcified mediastinal and hilar lymph nodes compatible with the history of sarcoidosis. There is also interstitial thickening in the lungs which appears to be related to chronic fibrosis related to sarcoid disease. No superimposed acute infiltrate or lung consolidation is identified.   Dilated pulmonary arteries compatible with pulmonary arterial hypertension. There is also evidence of chronic pulmonary venous hypertension, with multiple dilated tortuous pulmonary veins within the lungs bilaterally, and there is evidence of previous stent insertion in one of the main right pulmonary veins inferior to the right hilum. Increased attenuation and stranding of mediastinal fat planes is compatible with the history of fibrosing mediastinitis.  There is a small noncalcified lymph node in  the posterior mediastinum measuring 10 mm and a noncalcified 10 mm lymph node in the right retrocrural space, compatible with mild retrocrural lymphadenopathy.  This may also be related to sarcoid disease.  Coarse calcifications within the kidneys bilaterally is most likely a manifestation of renal sarcoidosis. There is no evidence of hydronephrosis.  B12 at 463, folate 10.1, saturation 24% and ferritin 235.5 on 01/14/2021.      PREVIOUS INTERVENTIONS:  Zometa 01/13/2021 and calcitonin 01/14/2021.      I have reviewed the HPI and verified with the patient the accuracy of it. No changes to interval history since the information was documented. Luis M Kim MD 02/15/21       Problem List Items Addressed This Visit     None        Oncology/Hematology History    No history exists.       PAST MEDICAL HISTORY:  ALLERGIES:  No Known Allergies  CURRENT MEDICATIONS:  Outpatient Encounter Medications as of 2/15/2021   Medication Sig Dispense Refill   • ALPRAZolam (XANAX) 0.25 MG tablet Take 0.25 mg by mouth 3 (Three) Times a Day As Needed for Anxiety.  1   • Aspirin Low Dose 81 MG EC tablet Take 81 mg by mouth Daily.     • buPROPion XL (Wellbutrin XL) 150 MG 24 hr tablet Take 1 tablet by mouth Daily. 30 tablet 12   • clopidogrel (PLAVIX) 75 MG tablet Take 1 tablet by mouth Daily. 30 tablet    • cyclobenzaprine (FLEXERIL) 10 MG tablet Take 10 mg by mouth 3 (Three) Times a Day As Needed for Muscle Spasms.     • levothyroxine (SYNTHROID, LEVOTHROID) 137 MCG tablet Take 137 mcg by mouth Daily.     • predniSONE (DELTASONE) 5 MG tablet Take 5 mg by mouth Daily.  0   • albuterol sulfate  (90 Base) MCG/ACT inhaler Inhale 2 puffs Every 4 (Four) Hours As Needed for Wheezing or Shortness of Air.       No facility-administered encounter medications on file as of 2/15/2021.      ADULT ILLNESSES:  Patient Active Problem List   Diagnosis Code   • Thyroid cancer (CMS/HCC) C73   • Sarcoidosis D86.9   • Fibrosing mediastinitis J98.51    • RUSSELL (acute kidney injury) (CMS/HCC) N17.9   • Hypercalcemia E83.52     SURGERIES:  Past Surgical History:   Procedure Laterality Date   • HYSTERECTOMY      without BSO   • KNEE ARTHROPLASTY     • LUNG SURGERY Right 12/02/2020    Stent placed in right lung   • TOTAL THYROIDECTOMY     • WISDOM TOOTH EXTRACTION       HEALTH MAINTENANCE ITEMS:  Health Maintenance Due   Topic Date Due   • COLONOSCOPY  1970   • ANNUAL PHYSICAL  11/02/1974   • Pneumococcal Vaccine 0-64 (1 of 1 - PPSV23) 11/02/1977   • TDAP/TD VACCINES (1 - Tdap) 11/02/1990   • INFLUENZA VACCINE  08/01/2020       <no information>  Last Completed Colonoscopy       Status Date      COLONOSCOPY No completions recorded          There is no immunization history on file for this patient.  Last Completed Mammogram       Status Date      MAMMOGRAM Done 1/18/2021 MAMMO SCREENING DIGITAL TOMOSYNTHESIS BILATERAL W CAD     Patient has more history with this topic...            FAMILY HISTORY:  Family History   Problem Relation Age of Onset   • Prostate cancer Father 72   • Arthritis Mother    • Arthritis Brother    • Colon cancer Paternal Uncle 50   • Thyroid cancer Other    • No Known Problems Sister    • No Known Problems Daughter    • No Known Problems Son    • No Known Problems Maternal Grandmother    • No Known Problems Paternal Grandmother    • No Known Problems Maternal Aunt    • No Known Problems Paternal Aunt    • Breast cancer Neg Hx    • Ovarian cancer Neg Hx    • Uterine cancer Neg Hx    • Melanoma Neg Hx    • BRCA 1/2 Neg Hx    • Endometrial cancer Neg Hx      SOCIAL HISTORY:  Social History     Socioeconomic History   • Marital status:      Spouse name: Not on file   • Number of children: Not on file   • Years of education: Not on file   • Highest education level: Not on file   Tobacco Use   • Smoking status: Never Smoker   • Smokeless tobacco: Never Used   Substance and Sexual Activity   • Alcohol use: Yes     Comment: occ   • Drug use:  "No   • Sexual activity: Yes     Birth control/protection: Post-menopausal       REVIEW OF SYSTEMS:    Review of Systems   Constitutional: Negative for chills, fatigue, fever and unexpected weight loss.        \"I feel good.\"   HENT: Negative for hearing loss and trouble swallowing.    Eyes: Negative for double vision and redness.   Respiratory: Negative for cough, shortness of breath and wheezing.    Cardiovascular: Negative for chest pain and palpitations.   Gastrointestinal: Negative for abdominal pain, constipation, diarrhea, nausea and vomiting.   Endocrine: Negative for cold intolerance and heat intolerance.   Genitourinary: Negative for difficulty urinating, dysuria, flank pain and vaginal bleeding.   Musculoskeletal: Negative for arthralgias and gait problem.   Skin: Negative for bruise.   Allergic/Immunologic: Negative for food allergies.   Neurological: Negative for speech difficulty, weakness and confusion.   Hematological: Negative for adenopathy.   Psychiatric/Behavioral: Negative for agitation. The patient is not nervous/anxious.          VITAL SIGNS: Temp 97.7 °F (36.5 °C)   Resp 18   Ht 167.6 cm (65.98\")   Wt 83.9 kg (185 lb)   LMP 10/12/2005 (Approximate) Comment: Pelvic pain  Breastfeeding No   BMI 29.88 kg/m²  Body surface area is 1.93 meters squared.   Pain Score    02/15/21 1113   PainSc: 0-No pain           PHYSICAL EXAMINATION: No exam due to phone visit.    Physical Exam    LABS    Lab Results - Last 18 Months   Lab Units 01/16/21  0647 01/15/21  0404 01/14/21  0451 01/13/21  1214 01/12/21  0701 01/04/21  0649   HEMOGLOBIN g/dL 11.3* 11.1* 10.6* 12.2 11.2* 12.3   HEMATOCRIT % 32.5* 32.2* 32.4* 35.1 32.4* 37.1   MCV fL 88.1 88.0 90.0 88.4 88.0 88.8   WBC 10*3/mm3 10.72 10.19 11.00* 11.22* 10.56 11.60*   RDW % 12.5 12.4 12.3 12.4 12.0* 11.9*   MPV fL 9.5 9.6 9.8 9.5  --   --    PLATELETS 10*3/mm3 277 272 314 341 317 306   IMM GRAN % % 0.7* 0.8* 0.8* 0.9*  --   --    NEUTROS ABS 10*3/mm3 " 6.28 6.92 7.01* 8.29* 8.06* 8.41*   LYMPHS ABS 10*3/mm3 2.30 1.43 2.07 1.62 1.31 1.53   MONOS ABS 10*3/mm3 1.58* 1.47* 1.52* 1.02* 0.86 1.25*   EOS ABS 10*3/mm3 0.38 0.21 0.23 0.11 0.18 0.24   BASOS ABS 10*3/mm3 0.10 0.08 0.08 0.08 0.08 0.09   IMMATURE GRANS (ABS) 10*3/mm3 0.08* 0.08* 0.09* 0.10*  --   --    NRBC /100 WBC 0.0 0.0 0.0 0.0 0.0 0.0       Lab Results - Last 18 Months   Lab Units 01/16/21  0647 01/15/21  1301 01/15/21  0404 01/14/21  0747 01/14/21  0451 01/13/21  2320 01/13/21  1617 01/13/21  1214  01/12/21  0701  10/27/20  1404   GLUCOSE mg/dL 85 73 88 90 117* 107* 86 94   < >  --   --   --    SODIUM mmol/L 140 141 141 142 140 139 140 138   < > 139  --  140   POTASSIUM mmol/L 3.1* 3.2* 2.8* 3.3* 3.2* 3.5 3.9 3.8   < > 4.1  --  3.8   TOTAL CO2 mmol/L  --   --   --   --   --   --   --   --   --  31.0*  --  28.7   CO2 mmol/L 24.0 26.0 25.0 27.0 28.0 27.0 28.0 29.0   < >  --   --   --    CHLORIDE mmol/L 106 102 104 103 102 100 101 98   < > 98  --  102   ANION GAP mmol/L 10.0 13.0 12.0 12.0 10.0 12.0 11.0 11.0   < >  --   --   --    CREATININE mg/dL 2.80* 3.00* 3.10* 3.52* 3.59* 3.75* 3.88* 3.78*   < > 3.51*   < > 1.30*   BUN mg/dL 24* 23* 22* 32* 32* 32* 36* 36*   < > 39*  --  18   BUN / CREAT RATIO  8.6 7.7 7.1 9.1 8.9 8.5 9.3 9.5   < > 11.1  --  13.8   CALCIUM mg/dL 8.8 9.6 9.7 12.0* 12.2* 13.2* 14.9* 15.7*   < > 12.5*  --  9.6   EGFR IF NONAFRICN AM mL/min/1.73 18* 17* 16* 14* 13* 13* 12* 13*   < > 14*  --  44*   ALK PHOS U/L 133*  --  131*  --  135*  --   --  177*  --  165*  --  101   TOTAL PROTEIN g/dL 7.7  --  7.4  --  7.5  --   --  9.0*  --   --   --   --    ALT (SGPT) U/L 17  --  15  --  22  --   --  24  --  29  --  28   AST (SGOT) U/L 19  --  15  --  17  --   --  18  --  30  --  26   BILIRUBIN mg/dL 0.2  --  0.2  --  0.2  --   --  0.2  --  0.2  --  0.2   ALBUMIN g/dL 3.90  --  3.90  --  3.80  --  3.5 4.50  --  4.20  --  4.50   GLOBULIN gm/dL 3.8  --  3.5  --  3.7  --   --  4.5  --   --   --   --      < > = values in this interval not displayed.       Lab Results - Last 18 Months   Lab Units 01/14/21  0451 01/13/21  1617   M-SPIKE g/dL  --  Not Observed   URIC ACID mg/dL 5.3  --        Lab Results - Last 18 Months   Lab Units 01/15/21  0404 01/14/21  0451 01/13/21  1214   IRON mcg/dL  --  61 59   TIBC mcg/dL  --  253* 317   IRON SATURATION %  --  24 19*   FERRITIN ng/mL  --  235.50* 271.70*   TSH uIU/mL 1.730  --   --    FOLATE ng/mL  --   --  10.10         Ines Rowland reports a pain score of 0.     Patient's Body mass index is 29.88 kg/m². BMI is within normal parameters. No follow-up required..        ASSESSMENT:   1.  Lymphadenopathy, left cardiac and right retrocrural, from sarcoidosis.  Too small for biopsy.  2.  Hypercalcemia resolved 01/15/2021, likely from sarcoidosis.  Intact PTH 1.4.   Treatment status: Zometa 01/13/2021 and calcitonin 01/14/2021.  3.  Sarcoidosis.  4.  Leukocytosis likely reactive process.  5.  Acute kidney injury,,improving.  6.  Normocytic anemia from chronic disease.    7.  History of T1b, N0, follicular thyroid cancer post right hemithyroidectomy by Dr. Luu on 02/16/2016.      PLAN:  1.   Re: SPEP showed no M spike and PTH RP normal less than 2 on 01/13/2021.  2.   Re: Calcium of 8.8 with GFR of 18 mL/min on 01/16/2021  3.   Re: Heme status.  Hemoglobin 11.3 and hematocrit 32.5 on 01/16/2021.  4.  Blood for CBC with differential and CMP 02/25/2021 at Dr. Navarro.  5.  Stable for observation.  6.  Continue care per primary care physician at the specialist.  7.  Plan of care discussed with patient.  Understanding expressed.  Patient go to proceed.  8.  Return to office in 2 months with preoffice CBC with differential, CMP, ferritin, iron panel, CT of the chest, abdomen and pelvis without IV contrast to follow left cardiac and right retrocrural adenopathies.  9.  Further recommendations pending.        I spent 30 total minutes, face-to-face, caring  for Ines rosario.  Greater than 50% of this time involved counseling and/or coordination of care as documented within this note regarding the patient's illness(es), pros and cons of various treatment options, instructions and/or risk reduction.      MD Chas Diaz MD

## 2021-02-09 ENCOUNTER — TELEPHONE (OUTPATIENT)
Dept: ONCOLOGY | Facility: CLINIC | Age: 51
End: 2021-02-09

## 2021-02-09 NOTE — TELEPHONE ENCOUNTER
Pt has 2/15 appt.  She would like to see if this could be r/s as a video visit or a telephone visit.    [phone]

## 2021-02-15 ENCOUNTER — OFFICE VISIT (OUTPATIENT)
Dept: ONCOLOGY | Facility: CLINIC | Age: 51
End: 2021-02-15

## 2021-02-15 VITALS — RESPIRATION RATE: 18 BRPM | TEMPERATURE: 97.7 F | BODY MASS INDEX: 29.73 KG/M2 | HEIGHT: 66 IN | WEIGHT: 185 LBS

## 2021-02-15 DIAGNOSIS — D63.8 ANEMIA IN OTHER CHRONIC DISEASES CLASSIFIED ELSEWHERE: ICD-10-CM

## 2021-02-15 DIAGNOSIS — D86.9 SARCOIDOSIS: Primary | ICD-10-CM

## 2021-02-15 PROCEDURE — 99443 PR PHYS/QHP TELEPHONE EVALUATION 21-30 MIN: CPT | Performed by: INTERNAL MEDICINE

## 2021-02-18 DIAGNOSIS — D86.9 SARCOIDOSIS: Primary | ICD-10-CM

## 2021-04-08 NOTE — PROGRESS NOTES
[facility] HEMATOLOGY AND ONCOLOGY  [address]  [place] [place] [address] [phone]    Patient Name: Ines Rowland  Encounter Date: 04/16/2021  YOB: 1970  Patient Number: [id number]    Telephone visit.  Patient given verbal consent for phone visit.    REASON FOR FOLLOW-UP: Ines Rowland is a pleasant 50 y.o.  female on follow-up for lymphadenopathy, left cardiac and right retrocrural and hypercalcemia from from sarcoidosis.  She is also followed for normocytic anemia from chronic disease. She is a reliable historian.        DIAGNOSTIC ABNORMALITIES:  CT of the chest 08/10/2020 Telephone.  Overall stable exam compared to 12/23/2019 with scattered calcified granulomas and extensive calcified mediastinal fibrosis that results in chronic narrowing of the central airways and pulmonary vasculature.  Extensive arterial collaterals from the right upper lobe supplying the right mid and lower lobes and from the left lower lobe supplying the left upper lobe.  Extensive right venous collaterals are also noted.  Interseptal lobar thickening and groundglass opacities in the right mid and lower and left upper lobes likely represent sarcoidosis related interstitial lung disease.  She presented with right lower quadrant abdominal pain with nausea and vomiting since Christmas eve.  She had seen ORACIO Awan.  Transvaginal ultrasound 12/20/2020. The uterus is absent.  The left ovary is not visualized.  The right ovary appears normal and is without cysts or masses.  Gallbladder ultrasound 12/29/2020.  Unremarkable ultrasound the gallbladder, liver and biliary system.  Several shadowing cortical calcifications within the right kidney.  Hepatobiliary scan 12/29/2020.The gallbladder ejection fraction equals 30%, which is in the normal range.  CT abdomen and pelvis 12/30/2020. Right lower lobe groundglass opacities with  tortuosity of the right lower lobe vasculature, incompletely visualized and characterized on this exam. Consider CT chest for further evaluation.  Enlarged left cardiac and right retrocrural lymph nodes, which could  be further evaluated on the above recommended CT chest. Calcifications in the left greater than right kidney suggesting nephrocalcinosis.  CT scan was reviewed with radiology and adenopathies measured at 7 mm.  Not accessible for needle biopsy.  CBC on 11/13/2021 remarkable for WBC 11.2 with ANC of 8.2.  CMP remarkable for BUN 36, creatinine 3.7, calcium 15.7, protein 9, alkaline phosphatase 177 and GFR 13 mL/min.  Iron saturation 19% and ferritin 271.7.  B12 pending.  Intact PTH 1.4.  PTH RP pending.  Ionized calcium greater than 7.2. Urinalysis on admission trace protein and trace leukocyte esterase.  Urine sodium 71, total protein 13.8, and osmolarity 243.  RBC 0-2, WBC 3-5 and squamous epithelial cells 3-6.  Renal ultrasound 01/13/2021 showed no hydronephrosis.  Redemonstration of bilateral renal parenchymal calcifications suggestive of nephro calcinosis.  CT chest 01/13/2021. Enlarged coarsely calcified mediastinal and hilar lymph nodes compatible with the history of sarcoidosis. There is also interstitial thickening in the lungs which appears to be related to chronic fibrosis related to sarcoid disease. No superimposed acute infiltrate or lung consolidation is identified.   Dilated pulmonary arteries compatible with pulmonary arterial hypertension. There is also evidence of chronic pulmonary venous hypertension, with multiple dilated tortuous pulmonary veins within the lungs bilaterally, and there is evidence of previous stent insertion in one of the main right pulmonary veins inferior to the right hilum. Increased attenuation and stranding of mediastinal fat planes is compatible with the history of fibrosing mediastinitis.  There is a small noncalcified lymph node in the posterior mediastinum  measuring 10 mm and a noncalcified 10 mm lymph node in the right retrocrural space, compatible with mild retrocrural lymphadenopathy.  This may also be related to sarcoid disease.  Coarse calcifications within the kidneys bilaterally is most likely a manifestation of renal sarcoidosis. There is no evidence of hydronephrosis.  B12 at 463, folate 10.1, saturation 24% and ferritin 235.5 on 01/14/2021.  SPEP showed no M spike and PTH RP normal less than 2 on 01/13/2021.         PREVIOUS INTERVENTIONS:  Zometa 01/13/2021 and calcitonin 01/14/2021.                   Oncology/Hematology History    No history exists.       PAST MEDICAL HISTORY:  ALLERGIES:  No Known Allergies  CURRENT MEDICATIONS:  Outpatient Encounter Medications as of 4/16/2021   Medication Sig Dispense Refill   • albuterol sulfate  (90 Base) MCG/ACT inhaler Inhale 2 puffs Every 4 (Four) Hours As Needed for Wheezing or Shortness of Air.     • ALPRAZolam (XANAX) 0.25 MG tablet Take 0.25 mg by mouth 3 (Three) Times a Day As Needed for Anxiety.  1   • buPROPion XL (Wellbutrin XL) 150 MG 24 hr tablet Take 1 tablet by mouth Daily. 30 tablet 12   • cyclobenzaprine (FLEXERIL) 10 MG tablet Take 10 mg by mouth 3 (Three) Times a Day As Needed for Muscle Spasms.     • levothyroxine (SYNTHROID, LEVOTHROID) 137 MCG tablet Take 137 mcg by mouth Daily.     • predniSONE (DELTASONE) 5 MG tablet Take 5 mg by mouth Daily.  0   • Aspirin Low Dose 81 MG EC tablet Take 81 mg by mouth Daily.     • clopidogrel (PLAVIX) 75 MG tablet Take 1 tablet by mouth Daily. 30 tablet      No facility-administered encounter medications on file as of 4/16/2021.     ADULT ILLNESSES:  Patient Active Problem List   Diagnosis Code   • Thyroid cancer (CMS/HCC) C73   • Sarcoidosis D86.9   • Fibrosing mediastinitis J98.51   • RUSSELL (acute kidney injury) (CMS/HCC) N17.9   • Hypercalcemia E83.52     SURGERIES:  Past Surgical History:   Procedure Laterality Date   • HYSTERECTOMY      without BSO    • KNEE ARTHROPLASTY     • LUNG SURGERY Right 12/02/2020    Stent placed in right lung   • TOTAL THYROIDECTOMY     • WISDOM TOOTH EXTRACTION       HEALTH MAINTENANCE ITEMS:  Health Maintenance Due   Topic Date Due   • COLONOSCOPY  Never done   • ANNUAL PHYSICAL  Never done   • Pneumococcal Vaccine 0-64 (1 of 1 - PPSV23) Never done   • TDAP/TD VACCINES (1 - Tdap) Never done   • DXA SCAN  10/25/2018       <no information>  Last Completed Colonoscopy       Status Date      COLONOSCOPY No completions recorded          There is no immunization history on file for this patient.  Last Completed Mammogram       Status Date      MAMMOGRAM Done 1/18/2021 MAMMO SCREENING DIGITAL TOMOSYNTHESIS BILATERAL W CAD     Patient has more history with this topic...            FAMILY HISTORY:  Family History   Problem Relation Age of Onset   • Prostate cancer Father 72   • Arthritis Mother    • Arthritis Brother    • Colon cancer Paternal Uncle 50   • Thyroid cancer Other    • No Known Problems Sister    • No Known Problems Daughter    • No Known Problems Son    • No Known Problems Maternal Grandmother    • No Known Problems Paternal Grandmother    • No Known Problems Maternal Aunt    • No Known Problems Paternal Aunt    • Breast cancer Neg Hx    • Ovarian cancer Neg Hx    • Uterine cancer Neg Hx    • Melanoma Neg Hx    • BRCA 1/2 Neg Hx    • Endometrial cancer Neg Hx      SOCIAL HISTORY:  Social History     Socioeconomic History   • Marital status:      Spouse name: Not on file   • Number of children: Not on file   • Years of education: Not on file   • Highest education level: Not on file   Tobacco Use   • Smoking status: Never Smoker   • Smokeless tobacco: Never Used   Substance and Sexual Activity   • Alcohol use: Yes     Comment: occ   • Drug use: No   • Sexual activity: Yes     Birth control/protection: Post-menopausal       REVIEW OF SYSTEMS:    Review of Systems   Constitutional: Negative for chills, fatigue, fever and  "unexpected weight change.        \"I feel good. No trouble since I got out of the hospital.    HENT: Negative for congestion, nosebleeds and trouble swallowing.    Eyes: Negative for redness and visual disturbance.   Respiratory: Negative for cough, shortness of breath and wheezing.    Cardiovascular: Negative for chest pain and leg swelling.   Gastrointestinal: Negative for abdominal pain, constipation, diarrhea, nausea and vomiting.   Endocrine: Negative for cold intolerance and heat intolerance.   Genitourinary: Negative for difficulty urinating, dysuria and flank pain.   Musculoskeletal: Negative for gait problem and joint swelling.   Skin: Negative for wound.   Allergic/Immunologic: Negative for food allergies.   Neurological: Negative for dizziness, speech difficulty and weakness.   Hematological: Negative for adenopathy. Does not bruise/bleed easily.   Psychiatric/Behavioral: Negative for agitation, confusion and hallucinations.       VITAL SIGNS: /64   Temp 97.7 °F (36.5 °C)   Resp 18   Ht 167.6 cm (65.98\")   Wt 83.9 kg (185 lb)   LMP 10/12/2005 (Approximate) Comment: Pelvic pain  SpO2 98%   Breastfeeding No   BMI 29.88 kg/m²   Pain Score    04/16/21 0857   PainSc: 0-No pain       PHYSICAL EXAMINATION: No exam due to phone visit    Physical Exam    LABS    Lab Results - Last 18 Months   Lab Units 01/16/21  0647 01/15/21  0404 01/14/21  0451 01/13/21  1214 01/12/21  0701 01/04/21  0649   HEMOGLOBIN g/dL 11.3* 11.1* 10.6* 12.2 11.2* 12.3   HEMATOCRIT % 32.5* 32.2* 32.4* 35.1 32.4* 37.1   MCV fL 88.1 88.0 90.0 88.4 88.0 88.8   WBC 10*3/mm3 10.72 10.19 11.00* 11.22* 10.56 11.60*   RDW % 12.5 12.4 12.3 12.4 12.0* 11.9*   MPV fL 9.5 9.6 9.8 9.5  --   --    PLATELETS 10*3/mm3 277 272 314 341 317 306   IMM GRAN % % 0.7* 0.8* 0.8* 0.9*  --   --    NEUTROS ABS 10*3/mm3 6.28 6.92 7.01* 8.29* 8.06* 8.41*   LYMPHS ABS 10*3/mm3 2.30 1.43 2.07 1.62 1.31 1.53   MONOS ABS 10*3/mm3 1.58* 1.47* 1.52* 1.02* 0.86 " 1.25*   EOS ABS 10*3/mm3 0.38 0.21 0.23 0.11 0.18 0.24   BASOS ABS 10*3/mm3 0.10 0.08 0.08 0.08 0.08 0.09   IMMATURE GRANS (ABS) 10*3/mm3 0.08* 0.08* 0.09* 0.10*  --   --    NRBC /100 WBC 0.0 0.0 0.0 0.0 0.0 0.0       Lab Results - Last 18 Months   Lab Units 01/16/21  0647 01/15/21  1301 01/15/21  0404 01/14/21  0747 01/14/21  0451 01/13/21  2320 01/13/21  1617 01/13/21  1214 01/12/21  0701 12/30/20  0924 10/27/20  1404   GLUCOSE mg/dL 85 73 88 90 117* 107* 86 94  --    < >  --    SODIUM mmol/L 140 141 141 142 140 139 140 138 139   < > 140   POTASSIUM mmol/L 3.1* 3.2* 2.8* 3.3* 3.2* 3.5 3.9 3.8 4.1   < > 3.8   TOTAL CO2 mmol/L  --   --   --   --   --   --   --   --  31.0*  --  28.7   CO2 mmol/L 24.0 26.0 25.0 27.0 28.0 27.0 28.0 29.0  --    < >  --    CHLORIDE mmol/L 106 102 104 103 102 100 101 98 98   < > 102   ANION GAP mmol/L 10.0 13.0 12.0 12.0 10.0 12.0 11.0 11.0  --    < >  --    CREATININE mg/dL 2.80* 3.00* 3.10* 3.52* 3.59* 3.75* 3.88* 3.78* 3.51*   < > 1.30*   BUN mg/dL 24* 23* 22* 32* 32* 32* 36* 36* 39*   < > 18   BUN / CREAT RATIO  8.6 7.7 7.1 9.1 8.9 8.5 9.3 9.5 11.1   < > 13.8   CALCIUM mg/dL 8.8 9.6 9.7 12.0* 12.2* 13.2* 14.9* 15.7* 12.5*   < > 9.6   EGFR IF NONAFRICN AM mL/min/1.73 18* 17* 16* 14* 13* 13* 12* 13* 14*   < > 44*   ALK PHOS U/L 133*  --  131*  --  135*  --   --  177* 165*  --  101   TOTAL PROTEIN g/dL 7.7  --  7.4  --  7.5  --   --  9.0*  --   --   --    ALT (SGPT) U/L 17  --  15  --  22  --   --  24 29  --  28   AST (SGOT) U/L 19  --  15  --  17  --   --  18 30  --  26   BILIRUBIN mg/dL 0.2  --  0.2  --  0.2  --   --  0.2 0.2  --  0.2   ALBUMIN g/dL 3.90  --  3.90  --  3.80  --  3.5 4.50 4.20  --  4.50   GLOBULIN gm/dL 3.8  --  3.5  --  3.7  --   --  4.5  --   --   --     < > = values in this interval not displayed.       Lab Results - Last 18 Months   Lab Units 01/14/21  0451 01/13/21  1617   M-SPIKE g/dL  --  Not Observed   URIC ACID mg/dL 5.3  --        Lab Results - Last 18 Months  "  Lab Units 01/15/21  0404 01/14/21  0451 01/13/21  1214   IRON mcg/dL  --  61 59   TIBC mcg/dL  --  253* 317   IRON SATURATION %  --  24 19*   FERRITIN ng/mL  --  235.50* 271.70*   TSH uIU/mL 1.730  --   --    FOLATE ng/mL  --   --  10.10       Ines Rowland reports a pain score of 0.      Patient's Body mass index is 29.88 kg/m². BMI is within normal parameters. No follow-up required..      ASSESSMENT:   1.  Lymphadenopathy, left cardiac and right retrocrural, from sarcoidosis.  Too small for biopsy.  2.  Hypercalcemia resolved 01/15/2021, likely from sarcoidosis.  Intact PTH 1.4.   Treatment status: Zometa 01/13/2021 and calcitonin 01/14/2021.  3.  Sarcoidosis.  4.  Leukocytosis likely reactive process.  5.  Acute kidney injury,,improving.  6.  Normocytic anemia from chronic disease.    7.  History of T1b, N0, follicular thyroid cancer post right hemithyroidectomy by Dr. Luu on 02/16/2016.        PLAN:  1.   Re: CT chest, abdomen, and pelvis reports on 04/14/2021.  Pending.  2.   Re: CMP. None.\"  My calcium is better and also my kidney function is better.\"  3.   Re: Heme status. None.  4.   Re:  Ferritin and iron panel. None.  5.  Stable for observation.  6.  Continue care per primary care physician at the specialist.  7.  Plan of care discussed with patient.  Understanding expressed.  Patient go to proceed.  8.  Return to office in 4 months with preoffice CBC with differential and CMP.  9.  Obtain CBC with differential and CMP 03/2021 from Dr. Navarro.  10.  Obtain CT of the chest abdomen pelvis from [facility] [place]14[place].        I have reviewed the assessment and plan and verified the accuracy of it. No changes to assessment and plan since the information was documented. Luis M Kim MD 04/16/21        I spent 26 total minutes, caring for Ines rosario.  Greater than 50% of this time involved counseling and/or coordination of care as documented within this note regarding the " patient's illness(es), pros and cons of various treatment options, instructions and/or risk reduction.          MD Chas Diaz MD Justin Hewlitt, MD [facility].

## 2021-04-09 ENCOUNTER — APPOINTMENT (OUTPATIENT)
Dept: CT IMAGING | Facility: HOSPITAL | Age: 51
End: 2021-04-09

## 2021-04-12 ENCOUNTER — TELEPHONE (OUTPATIENT)
Dept: ONCOLOGY | Facility: CLINIC | Age: 51
End: 2021-04-12

## 2021-04-12 NOTE — TELEPHONE ENCOUNTER
Provider: OBDULIO  Caller: EVA  Relationship to Patient: SELF  Phone Number: [phone]    Reason for Call: REQUESTING A TELEPHONE VISIT ON 4/16 APPT.  PT ISN'T WHERE SHE CAN MAKE IT TO OFFICE AS IT'S JUST AN APPT FOR HER RESULTS OF HER SCANS.

## 2021-04-16 ENCOUNTER — OFFICE VISIT (OUTPATIENT)
Dept: ONCOLOGY | Facility: CLINIC | Age: 51
End: 2021-04-16

## 2021-04-16 ENCOUNTER — TELEPHONE (OUTPATIENT)
Dept: ONCOLOGY | Facility: CLINIC | Age: 51
End: 2021-04-16

## 2021-04-16 VITALS
TEMPERATURE: 97.7 F | HEIGHT: 66 IN | RESPIRATION RATE: 18 BRPM | WEIGHT: 185 LBS | OXYGEN SATURATION: 98 % | BODY MASS INDEX: 29.73 KG/M2 | DIASTOLIC BLOOD PRESSURE: 64 MMHG | SYSTOLIC BLOOD PRESSURE: 127 MMHG

## 2021-04-16 DIAGNOSIS — D86.9 SARCOIDOSIS: Primary | ICD-10-CM

## 2021-04-16 PROCEDURE — 99443 PR PHYS/QHP TELEPHONE EVALUATION 21-30 MIN: CPT | Performed by: INTERNAL MEDICINE

## 2021-04-16 NOTE — TELEPHONE ENCOUNTER
----- Message from Luis M Kim MD sent at 4/16/2021 11:46 AM CDT -----  Call patient, stable nodes.

## 2021-04-23 ENCOUNTER — TELEPHONE (OUTPATIENT)
Dept: ONCOLOGY | Facility: CLINIC | Age: 51
End: 2021-04-23

## 2021-04-23 NOTE — TELEPHONE ENCOUNTER
----- Message from Luis M Kim MD sent at 4/23/2021 11:27 AM CDT -----  Fax report to PCP.    Stable retrocrural nodes since 2017.    Notify patient of stable nodes.

## 2021-04-23 NOTE — TELEPHONE ENCOUNTER
Notified of CT scan results.  Ines asked if she needs to continue to see Dr. Kim since no cancer was found.  Discussed with her that I will talk with Dr. Kim next week and I will let her know.

## 2022-02-10 ENCOUNTER — OFFICE VISIT (OUTPATIENT)
Dept: OBSTETRICS AND GYNECOLOGY | Facility: CLINIC | Age: 52
End: 2022-02-10

## 2022-02-10 VITALS
BODY MASS INDEX: 32.78 KG/M2 | DIASTOLIC BLOOD PRESSURE: 80 MMHG | SYSTOLIC BLOOD PRESSURE: 120 MMHG | WEIGHT: 204 LBS | HEIGHT: 66 IN

## 2022-02-10 DIAGNOSIS — J98.51 FIBROSING MEDIASTINITIS: ICD-10-CM

## 2022-02-10 DIAGNOSIS — C73 THYROID CANCER: ICD-10-CM

## 2022-02-10 DIAGNOSIS — Z12.31 ENCOUNTER FOR SCREENING MAMMOGRAM FOR BREAST CANCER: ICD-10-CM

## 2022-02-10 DIAGNOSIS — Z01.419 WELL WOMAN EXAM WITH ROUTINE GYNECOLOGICAL EXAM: Primary | ICD-10-CM

## 2022-02-10 DIAGNOSIS — N90.4 LICHEN SCLEROSUS OF FEMALE GENITALIA: ICD-10-CM

## 2022-02-10 DIAGNOSIS — F32.9 REACTIVE DEPRESSION: ICD-10-CM

## 2022-02-10 DIAGNOSIS — Z13.820 ENCOUNTER FOR SCREENING FOR OSTEOPOROSIS: ICD-10-CM

## 2022-02-10 DIAGNOSIS — E55.9 VITAMIN D DEFICIENCY: ICD-10-CM

## 2022-02-10 DIAGNOSIS — N95.1 MENOPAUSAL SYMPTOMS: ICD-10-CM

## 2022-02-10 PROCEDURE — 99396 PREV VISIT EST AGE 40-64: CPT | Performed by: NURSE PRACTITIONER

## 2022-02-10 PROCEDURE — 99213 OFFICE O/P EST LOW 20 MIN: CPT | Performed by: NURSE PRACTITIONER

## 2022-02-10 RX ORDER — CLOBETASOL PROPIONATE 0.5 MG/G
1 CREAM TOPICAL 2 TIMES DAILY
Qty: 30 G | Refills: 3 | Status: SHIPPED | OUTPATIENT
Start: 2022-02-10 | End: 2023-04-04

## 2022-02-10 RX ORDER — OMEPRAZOLE 20 MG/1
CAPSULE, DELAYED RELEASE ORAL
COMMUNITY
Start: 2022-01-18

## 2022-02-10 RX ORDER — ALBUTEROL SULFATE 90 UG/1
2 POWDER, METERED RESPIRATORY (INHALATION) EVERY 4 HOURS PRN
Qty: 1 EACH | Refills: 6 | Status: SHIPPED | OUTPATIENT
Start: 2022-02-10

## 2022-02-10 RX ORDER — ALBUTEROL SULFATE 90 UG/1
POWDER, METERED RESPIRATORY (INHALATION)
COMMUNITY
Start: 2021-09-13 | End: 2022-02-10 | Stop reason: SDUPTHER

## 2022-02-10 RX ORDER — BUPROPION HYDROCHLORIDE 150 MG/1
150 TABLET ORAL DAILY
Qty: 30 TABLET | Refills: 12 | Status: SHIPPED | OUTPATIENT
Start: 2022-02-10 | End: 2022-02-25 | Stop reason: SDUPTHER

## 2022-02-10 RX ORDER — CALCITRIOL 0.5 UG/1
CAPSULE, LIQUID FILLED ORAL
COMMUNITY
Start: 2022-02-08

## 2022-02-10 NOTE — PROGRESS NOTES
"Subjective     Ines Rowland is a 51 y.o. female    Patient is here today for yearly checkup.  She has complaints of menopausal symptoms.  States she is having hot flashes, night sweats, vaginal dryness.    Gynecologic Exam  The patient's pertinent negatives include no pelvic pain, vaginal bleeding or vaginal discharge. The patient is experiencing no pain. Pertinent negatives include no abdominal pain, anorexia, back pain, chills, constipation, diarrhea, discolored urine, dysuria, fever, flank pain, frequency, headaches, hematuria, joint pain, joint swelling, nausea, painful intercourse, rash, sore throat, urgency or vomiting. She is sexually active. She uses hysterectomy for contraception. She is postmenopausal.         /80   Ht 167.6 cm (65.98\")   Wt 92.5 kg (204 lb)   LMP 10/12/2005 (Approximate) Comment: Pelvic pain  BMI 32.94 kg/m²     Outpatient Encounter Medications as of 2/10/2022   Medication Sig Dispense Refill   • albuterol (ProAir RespiClick) 108 (90 Base) MCG/ACT inhaler Inhale 2 puffs Every 4 (Four) Hours As Needed for Wheezing. 1 each 6   • buPROPion XL (Wellbutrin XL) 150 MG 24 hr tablet Take 1 tablet by mouth Daily. 30 tablet 12   • calcitriol (ROCALTROL) 0.5 MCG capsule      • cyclobenzaprine (FLEXERIL) 10 MG tablet Take 10 mg by mouth 3 (Three) Times a Day As Needed for Muscle Spasms.     • levothyroxine (SYNTHROID, LEVOTHROID) 137 MCG tablet Take 137 mcg by mouth Daily.     • omeprazole (priLOSEC) 20 MG capsule      • predniSONE (DELTASONE) 5 MG tablet Take 5 mg by mouth Daily.  0   • [DISCONTINUED] albuterol (ProAir RespiClick) 108 (90 Base) MCG/ACT inhaler      • [DISCONTINUED] buPROPion XL (Wellbutrin XL) 150 MG 24 hr tablet Take 1 tablet by mouth Daily. 30 tablet 12   • clobetasol (Temovate) 0.05 % cream Apply 1 application topically to the appropriate area as directed 2 (Two) Times a Day. 30 g 3   • [DISCONTINUED] ALPRAZolam (XANAX) 0.25 MG tablet Take 0.25 mg by mouth 3 " (Three) Times a Day As Needed for Anxiety.  1     No facility-administered encounter medications on file as of 2/10/2022.       Surgical History  Past Surgical History:   Procedure Laterality Date   • HYSTERECTOMY      without BSO   • KNEE ARTHROPLASTY     • LUNG SURGERY Right 12/02/2020    Stent placed in right lung   • TOTAL THYROIDECTOMY     • WISDOM TOOTH EXTRACTION         Family History  Family History   Problem Relation Age of Onset   • Prostate cancer Father 72   • Arthritis Mother    • Arthritis Brother    • Colon cancer Paternal Uncle 50   • Thyroid cancer Other    • No Known Problems Sister    • No Known Problems Daughter    • No Known Problems Son    • No Known Problems Maternal Grandmother    • No Known Problems Paternal Grandmother    • No Known Problems Maternal Aunt    • No Known Problems Paternal Aunt    • Breast cancer Neg Hx    • Ovarian cancer Neg Hx    • Uterine cancer Neg Hx    • Melanoma Neg Hx    • BRCA 1/2 Neg Hx    • Endometrial cancer Neg Hx        The following portions of the patient's history were reviewed and updated as appropriate: allergies, current medications, past family history, past medical history, past social history, past surgical history, and problem list.    Review of Systems   Constitutional: Negative for activity change, appetite change, chills, diaphoresis, fatigue, fever, unexpected weight gain and unexpected weight loss.   HENT: Negative for congestion, dental problem, drooling, ear discharge, ear pain, facial swelling, hearing loss, mouth sores, nosebleeds, postnasal drip, rhinorrhea, sinus pressure, sneezing, sore throat, swollen glands, tinnitus, trouble swallowing and voice change.    Eyes: Negative for blurred vision, double vision, photophobia, pain, discharge, redness, itching and visual disturbance.   Respiratory: Negative for apnea, cough, choking, chest tightness, shortness of breath, wheezing and stridor.    Cardiovascular: Negative for chest pain,  palpitations and leg swelling.   Gastrointestinal: Negative for abdominal distention, abdominal pain, anal bleeding, anorexia, blood in stool, constipation, diarrhea, nausea, rectal pain, vomiting, GERD and indigestion.   Endocrine: Positive for heat intolerance. Negative for cold intolerance, polydipsia, polyphagia and polyuria.   Genitourinary: Positive for dyspareunia. Negative for amenorrhea, breast discharge, breast lump, breast pain, decreased libido, decreased urine volume, difficulty urinating, dysuria, flank pain, frequency, genital sores, hematuria, menstrual problem, pelvic pain, pelvic pressure, urgency, urinary incontinence, vaginal bleeding, vaginal discharge and vaginal pain.   Musculoskeletal: Negative for arthralgias, back pain, gait problem, joint pain, joint swelling, myalgias, neck pain, neck stiffness and bursitis.   Skin: Negative for color change, dry skin and rash.   Allergic/Immunologic: Negative for environmental allergies, food allergies and immunocompromised state.   Neurological: Negative for dizziness, tremors, seizures, syncope, facial asymmetry, speech difficulty, weakness, light-headedness, numbness, headache, memory problem and confusion.   Hematological: Negative for adenopathy. Does not bruise/bleed easily.   Psychiatric/Behavioral: Negative for agitation, behavioral problems, decreased concentration, dysphoric mood, hallucinations, self-injury, sleep disturbance, suicidal ideas, negative for hyperactivity, depressed mood and stress. The patient is not nervous/anxious.        Objective   Physical Exam  Vitals and nursing note reviewed. Exam conducted with a chaperone present.   Constitutional:       General: She is not in acute distress.     Appearance: She is well-developed. She is not diaphoretic.   HENT:      Head: Normocephalic.      Right Ear: External ear normal.      Left Ear: External ear normal.      Nose: Nose normal.   Eyes:      General: No scleral icterus.         Right eye: No discharge.         Left eye: No discharge.      Conjunctiva/sclera: Conjunctivae normal.      Pupils: Pupils are equal, round, and reactive to light.   Neck:      Thyroid: No thyromegaly.      Vascular: No carotid bruit.      Trachea: No tracheal deviation.   Cardiovascular:      Rate and Rhythm: Normal rate and regular rhythm.      Heart sounds: Normal heart sounds. No murmur heard.      Pulmonary:      Effort: Pulmonary effort is normal. No respiratory distress.      Breath sounds: Normal breath sounds. No wheezing.   Chest:   Breasts: Breasts are symmetrical.      Right: Normal. No swelling, bleeding, inverted nipple, mass, nipple discharge, skin change, tenderness, axillary adenopathy or supraclavicular adenopathy.      Left: Normal. No swelling, bleeding, inverted nipple, mass, nipple discharge, skin change, tenderness, axillary adenopathy or supraclavicular adenopathy.       Abdominal:      General: There is no distension.      Palpations: Abdomen is soft. There is no mass.      Tenderness: There is no abdominal tenderness. There is no right CVA tenderness, left CVA tenderness or guarding.      Hernia: No hernia is present. There is no hernia in the left inguinal area or right inguinal area.   Genitourinary:     General: Normal vulva.      Exam position: Lithotomy position.      Labia:         Right: No rash, tenderness, lesion or injury.         Left: No rash, tenderness, lesion or injury.       Vagina: Normal. No signs of injury and foreign body. No vaginal discharge, erythema, tenderness or bleeding.      Uterus: Absent.       Adnexa: Right adnexa normal and left adnexa normal.        Right: No mass, tenderness or fullness.          Left: No mass, tenderness or fullness.        Rectum: Normal. No mass.          Comments: Cervix and uterus are surgically absent  BSU normal  Urethral meatus  Normal  Perineum  Normal  Musculoskeletal:         General: No tenderness. Normal range of motion.       Cervical back: Normal range of motion and neck supple.   Lymphadenopathy:      Head:      Right side of head: No submental, submandibular, tonsillar, preauricular, posterior auricular or occipital adenopathy.      Left side of head: No submental, submandibular, tonsillar, preauricular, posterior auricular or occipital adenopathy.      Cervical: No cervical adenopathy.      Right cervical: No superficial, deep or posterior cervical adenopathy.     Left cervical: No superficial, deep or posterior cervical adenopathy.      Upper Body:      Right upper body: No supraclavicular, axillary or pectoral adenopathy.      Left upper body: No supraclavicular, axillary or pectoral adenopathy.      Lower Body: No right inguinal adenopathy. No left inguinal adenopathy.   Skin:     General: Skin is warm and dry.      Findings: No bruising, erythema or rash.   Neurological:      Mental Status: She is alert and oriented to person, place, and time.      Coordination: Coordination normal.   Psychiatric:         Mood and Affect: Mood normal.         Behavior: Behavior normal.         Thought Content: Thought content normal.         Judgment: Judgment normal.         Assessment/Plan   Diagnoses and all orders for this visit:    1. Well woman exam with routine gynecological exam (Primary)  Normal GYN exam. Will have lab work at PCP. Encouraged SBE, pt is aware how to do self breast exam and the importance of same. Discussed weight management and importance of maintaining a healthy weight. Discussed Vitamin D intake and the importance of adequate vitamin D for both Bone Health and a healthy immune system.  Discussed Daily exercise and the importance of same, in regards to a healthy heart as well as helping to maintain her weight and improving her mental health.  BMI 32.9.  Colonoscopy will be scheduled..  Mammogram bone density will be scheduled at [facility].  Pap smear is not done per [facility]P guidelines.  -     Ambulatory  Referral For Screening Colonoscopy    2. Encounter for screening mammogram for breast cancer  Comments:  Patient will have mammogram at [facility].  Orders:  -     Mammo Screening Digital Tomosynthesis Bilateral With CAD; Future    3. Thyroid cancer (HCC)  Comments:  She has history of thyroid cancer.  She is followed by Dr. Panda.  Recently had labs at his office.    4. Menopausal symptoms  Comments:  Patient is having menopausal symptoms.  Is having hot flashes off and on, night sweats.   Orders:  -     Cortisol  -     DHEA-Sulfate  -     Estradiol  -     Follicle Stimulating Hormone  -     Luteinizing Hormone  -     Progesterone  -     Testosterone    5. Fibrosing mediastinitis  Comments:  Patient is followed at [facility].  She is doing much better since they put a stent in her lung.  She is given a refill on her inhaler as she does not see them until next month.  Orders:  -     albuterol (ProAir RespiClick) 108 (90 Base) MCG/ACT inhaler; Inhale 2 puffs Every 4 (Four) Hours As Needed for Wheezing.  Dispense: 1 each; Refill: 6    6. Reactive depression  Comments:  Pt. is doing well on Wellbutrin. Rf given.  Orders:  -     buPROPion XL (Wellbutrin XL) 150 MG 24 hr tablet; Take 1 tablet by mouth Daily.  Dispense: 30 tablet; Refill: 12    7. Vitamin D deficiency  Comments:  Patient will have lab work drawn today.  Orders:  -     Vitamin D 25 Hydroxy    8. Lichen sclerosus of female genitalia  Comments:  Patient has moderate lichen sclerosus at the clitoral shfaer and at the top of the labia majora.  She is given Temovate cream and will return in 6 weeks for follow up.  Orders:  -     clobetasol (Temovate) 0.05 % cream; Apply 1 application topically to the appropriate area as directed 2 (Two) Times a Day.  Dispense: 30 g; Refill: 3    9. Encounter for screening for osteoporosis  Comments:  Patient will have a bone density at [facility].  Orders:  -     DEXA Bone Density Axial; Future          Patient's Body mass index is 32.94 kg/m². indicating that she is obese (BMI >30). Obesity-related health conditions include the following: none. Obesity is unchanged. BMI is is above average; BMI management plan is completed. We discussed portion control and increasing exercise..      Lynette Gallegos, APRN  2/10/2022

## 2022-02-10 NOTE — PATIENT INSTRUCTIONS
Health Maintenance, Female  Adopting a healthy lifestyle and getting preventive care are important in promoting health and wellness. Ask your health care provider about:  · The right schedule for you to have regular tests and exams.  · Things you can do on your own to prevent diseases and keep yourself healthy.  What should I know about diet, weight, and exercise?  Eat a healthy diet    · Eat a diet that includes plenty of vegetables, fruits, low-fat dairy products, and lean protein.  · Do not eat a lot of foods that are high in solid fats, added sugars, or sodium.    Maintain a healthy weight  Body mass index (BMI) is used to identify weight problems. It estimates body fat based on height and weight. Your health care provider can help determine your BMI and help you achieve or maintain a healthy weight.  Get regular exercise  Get regular exercise. This is one of the most important things you can do for your health. Most adults should:  · Exercise for at least 150 minutes each week. The exercise should increase your heart rate and make you sweat (moderate-intensity exercise).  · Do strengthening exercises at least twice a week. This is in addition to the moderate-intensity exercise.  · Spend less time sitting. Even light physical activity can be beneficial.  · Start  Vitamin D 5000IU per day. Vitamin D has been shown to improve your mood, help with fatigue and increase your immune system. A normal Vitamin D in women should be 50-70.  You should have your Vitamin D checked yearly  Watch cholesterol and blood lipids  Have your blood tested for lipids and cholesterol at 20 years of age, then have this test every 3 years.  Have your cholesterol levels checked more often if:  · Your lipid or cholesterol levels are high.  · You are older than 30 years of age.  · You are at high risk for heart disease.  What should I know about cancer screening?  Depending on your health history and family history, you may need to have  cancer screening at various ages. This may include screening for:  · Breast cancer.  · Cervical cancer.  · Colorectal cancer.  · Skin cancer.  · Lung cancer.  What should I know about heart disease, diabetes, and high blood pressure?  Blood pressure and heart disease  1. High blood pressure causes heart disease and increases the risk of stroke. This is more likely to develop in people who have high blood pressure readings, are of  descent, or are overweight.  2. Have your blood pressure checked:                  Every year.  Diabetes  Have regular diabetes screenings. This checks your fasting blood sugar level. Have the screening done:  · Once every year after age 30 if you are at a normal weight and have a low risk for diabetes.  · More often and at a younger age if you are overweight or have a high risk for diabetes.  What should I know about preventing infection?  Hepatitis B  If you have a higher risk for hepatitis B, you should be screened for this virus. Talk with your health care provider to find out if you are at risk for hepatitis B infection.  Hepatitis C  Testing is recommended for:  · Everyone born from 1945 through 1965.  · Anyone with known risk factors for hepatitis C.  Sexually transmitted infections (STIs)  1. Get screened for STIs, including gonorrhea and chlamydia, if:  ? You are sexually active and are younger than 24 years of age.  ? You are older than 24 years of age and your health care provider tells you that you are at risk for this type of infection.  ? Your sexual activity has changed since you were last screened, and you are at increased risk for chlamydia or gonorrhea. Ask your health care provider if you are at risk.  2. Ask your health care provider about whether you are at high risk for HIV. Your health care provider may recommend a prescription medicine to help prevent HIV infection. If you choose to take medicine to prevent HIV, you should first get tested for HIV. You should  then be tested every 3 months for as long as you are taking the medicine.  Pregnancy  · If you are about to stop having your period (premenopausal) and you may become pregnant, seek counseling before you get pregnant.  · Take 400 to 800 micrograms (mcg) of folic acid every day if you become pregnant.  · Ask for birth control (contraception) if you want to prevent pregnancy.  Osteoporosis and menopause  Osteoporosis is a disease in which the bones lose minerals and strength with aging. This can result in bone fractures. If you are 55 years old or older, or if you are at risk for osteoporosis and fractures, ask your health care provider if you should:  · Be screened for bone loss.  · Take a calcium or vitamin D supplement to lower your risk of fractures.  · Be given hormone replacement therapy (HRT) to treat symptoms of menopause.  Follow these instructions at home:  Lifestyle  · Do not use any products that contain nicotine or tobacco, such as cigarettes, e-cigarettes, and chewing tobacco. If you need help quitting, ask your health care provider.  · Do not use street drugs.  · Do not share needles.  · Ask your health care provider for help if you need support or information about quitting drugs.  Alcohol use  1. Do not drink alcohol if:  ? Your health care provider tells you not to drink.  ? You are pregnant, may be pregnant, or are planning to become pregnant.  2. If you drink alcohol:  ? Limit how much you use to 0-1 drink a day.  ? Limit intake if you are breastfeeding.  3. Be aware of how much alcohol is in your drink. In the [place] one drink equals one 12 oz bottle of beer (355 mL), one 5 oz glass of wine (148 mL), or one 1½ oz glass of hard liquor (44 mL).  General instructions  1. Schedule regular health, dental, and eye exams.  2. Stay current with your vaccines.  3. Tell your health care provider if:  ? You often feel depressed.  ? You have ever been abused or do not feel safe at home.  Summary  · Adopting a  healthy lifestyle and getting preventive care are important in promoting health and wellness.  · Follow your health care provider's instructions about healthy diet, exercising, and getting tested or screened for diseases.  · Follow your health care provider's instructions on monitoring your cholesterol and blood pressure.  This information is not intended to replace advice given to you by your health care provider. Make sure you discuss any questions you have with your health care provider.  Document Revised: 12/11/2019 Document Reviewed: 12/11/2019  [employer] Patient Education © 2021 [employer]      BMI for Adults  What is BMI?  Body mass index (BMI) is a number that is calculated from a person's weight and height. BMI can help estimate how much of a person's weight is composed of fat. BMI does not measure body fat directly. Rather, it is an alternative to procedures that directly measure body fat, which can be difficult and expensive.  BMI can help identify people who may be at higher risk for certain medical problems.  What are BMI measurements used for?  BMI is used as a screening tool to identify possible weight problems. It helps determine whether a person is obese, overweight, a healthy weight, or underweight.  BMI is useful for:  · Identifying a weight problem that may be related to a medical condition or may increase the risk for medical problems.  · Promoting changes, such as changes in diet and exercise, to help reach a healthy weight. BMI screening can be repeated to see if these changes are working.  How is BMI calculated?  BMI involves measuring your weight in relation to your height. Both height and weight are measured, and the BMI is calculated from those numbers. This can be done either in English ([place] or metric measurements. Note that charts and online BMI calculators are available to help you find your BMI quickly and easily without having to do these calculations yourself.  To calculate your  "BMI in English ([place] measurements:    1. Measure your weight in pounds (lb).  2. Multiply the number of pounds by 703.  ? For example, for a person who weighs 180 lb, multiply that number by 703, which equals 126,540.  3. Measure your height in inches. Then multiply that number by itself to get a measurement called \"inches squared.\"  ? For example, for a person who is 70 inches tall, the \"inches squared\" measurement is 70 inches x 70 inches, which equals 4,900 inches squared.  4. Divide the total from step 2 (number of lb x 703) by the total from step 3 (inches squared): 126,540 ÷ 4,900 = 25.8. This is your BMI.    To calculate your BMI in metric measurements:  1. Measure your weight in kilograms (kg).  2. Measure your height in meters (m). Then multiply that number by itself to get a measurement called \"meters squared.\"  ? For example, for a person who is 1.75 m tall, the \"meters squared\" measurement is 1.75 m x 1.75 m, which is equal to 3.1 meters squared.  3. Divide the number of kilograms (your weight) by the meters squared number. In this example: 70 ÷ 3.1 = 22.6. This is your BMI.  What do the results mean?  BMI charts are used to identify whether you are underweight, normal weight, overweight, or obese. The following guidelines will be used:  · Underweight: BMI less than 18.5.  · Normal weight: BMI between 18.5 and 24.9.  · Overweight: BMI between 25 and 29.9.  · Obese: BMI of 30 or above.  Keep these notes in mind:  · Weight includes both fat and muscle, so someone with a muscular build, such as an athlete, may have a BMI that is higher than 24.9. In cases like these, BMI is not an accurate measure of body fat.  · To determine if excess body fat is the cause of a BMI of 25 or higher, further assessments may need to be done by a health care provider.  · BMI is usually interpreted in the same way for men and women.  Where to find more information  For more information about BMI, including tools to quickly " calculate your BMI, go to these websites:  · Centers for Disease Control and Prevention: www.cdc.gov  · American Heart Association: www.heart.org  · National Heart, Lung, and [employer] [place]: www.nhlbi.nih.gov  Summary  · Body mass index (BMI) is a number that is calculated from a person's weight and height.  · BMI may help estimate how much of a person's weight is composed of fat. BMI can help identify those who may be at higher risk for certain medical problems.  · BMI can be measured using English measurements or metric measurements.  · BMI charts are used to identify whether you are underweight, normal weight, overweight, or obese.  This information is not intended to replace advice given to you by your health care provider. Make sure you discuss any questions you have with your health care provider.  Document Revised: 09/09/2020 Document Reviewed: 07/17/2020  [employer] Patient Education © 2021 [employer]      Lichen Sclerosus  Lichen sclerosus is a skin problem. It can happen on any part of the body, but it commonly involves the anal and genital areas. It can cause itching and discomfort in these areas. Treatment can help to control symptoms. When the genital area is affected, getting treatment is important because the condition can cause scarring that may lead to other problems if left untreated.  What are the causes?  The cause of this condition is not known. It may be related to an overactive immune system or a lack of certain hormones. Lichen sclerosus is not an infection or a fungus, and it is not passed from one person to another (non-contagious).  What increases the risk?  The following factors may make you more likely to develop this condition:  · You are a woman who has reached menopause.  · You are a man who was not circumcised.  This condition may also develop for the first time in children, usually before they enter puberty.  What are the signs or symptoms?  Symptoms of this condition  include:  · White areas (plaques) on the skin that may be thin and wrinkled, or thickened.  · Red and swollen patches (lesions) on the skin.  · Tears or cracks in the skin.  · Bruising.  · Blood blisters.  · Severe itching.  · Pain, itching, or burning when urinating.  Constipation is also common in children with lichen sclerosus, but can be seen in adults.  How is this diagnosed?  This condition may be diagnosed with a physical exam. In some cases, a tissue sample may be removed to be checked under a microscope (biopsy).  How is this treated?  This condition may be treated with:  · Topical steroids. These are medicated creams or ointments that are applied over the affected areas.  · Medicines that are taken by mouth.  · Topical immunotherapy. These are medicated creams or ointments that are applied over the affected areas. They stimulate your immune system to fight the skin condition. This may be used if steroids are not effective.  · Surgery. This may be needed in more severe cases that are causing problems such as scarring.  Follow these instructions at home:  Medicines  · Take over-the-counter and prescription medicines only as told by your health care provider.  · Use creams or ointments as told by your health care provider.  Skin care  · Do not scratch the affected areas of skin.  · If you are a woman, be sure to keep the vaginal area as clean and dry as possible.  · Clean the affected area of skin gently with water only. Pat skin dry and avoid the use of rough towels or toilet paper.  · Avoid irritating skin products, including soap and scented lotions. Use emollient creams as directed by your health care provider to help reduce itching.  General instructions  · Keep all follow-up visits. This is important.  · Your condition may cause constipation. To prevent or treat constipation, you may need to:  ? Drink enough fluid to keep your urine pale yellow.  ? Take over-the-counter or prescription medicines.  ? Eat  foods that are high in fiber, such as beans, whole grains, and fresh fruits and vegetables.  ? Limit foods that are high in fat and processed sugars, such as fried or sweet foods.  Contact a health care provider if:  · You have increasing redness, swelling, or pain in the affected area.  · You have fluid, blood, or pus coming from the affected area.  · You have new lesions on your skin.  · You have a fever.  · You have pain during sex.  Get help right away if:  · You develop severe pain or burning in the affected areas, especially in the genital area.  Summary  · Lichen sclerosus is a skin problem. When the genital area is affected, getting treatment is important because the condition can cause scarring that may lead to other problems if left untreated.  · This condition is usually treated with medicated creams or ointments (topical steroids) that are applied over the affected areas.  · Take or use over-the-counter and prescription medicines only as told by your health care provider.  · Contact a health care provider if you have new lesions on your skin, have pain during sex, or have increasing redness, swelling, or pain in the affected area.  · Keep all follow-up visits. This is important.  This information is not intended to replace advice given to you by your health care provider. Make sure you discuss any questions you have with your health care provider.  Document Revised: 05/01/2021 Document Reviewed: 05/01/2021  [employer] Patient Education © 2021 [employer]

## 2022-02-11 LAB
25(OH)D3+25(OH)D2 SERPL-MCNC: 32 NG/ML (ref 30–100)
CORTIS SERPL-MCNC: 1.4 UG/DL
DHEA-S SERPL-MCNC: 17.3 UG/DL (ref 41.2–243.7)
ESTRADIOL SERPL-MCNC: <5 PG/ML
FSH SERPL-ACNC: 37.9 MIU/ML
LH SERPL-ACNC: 29.9 MIU/ML
PROGEST SERPL-MCNC: <0.1 NG/ML
TESTOST SERPL-MCNC: <3 NG/DL (ref 4–50)

## 2022-02-16 RX ORDER — ESTRADIOL 0.5 MG/1
0.5 TABLET ORAL DAILY
Qty: 30 TABLET | Refills: 12 | Status: SHIPPED | OUTPATIENT
Start: 2022-02-16 | End: 2023-02-20

## 2022-02-25 DIAGNOSIS — F32.9 REACTIVE DEPRESSION: ICD-10-CM

## 2022-02-25 RX ORDER — BUPROPION HYDROCHLORIDE 300 MG/1
300 TABLET ORAL DAILY
Qty: 30 TABLET | Refills: 12 | Status: SHIPPED | OUTPATIENT
Start: 2022-02-25 | End: 2023-03-15

## 2022-02-28 ENCOUNTER — APPOINTMENT (OUTPATIENT)
Dept: MAMMOGRAPHY | Facility: HOSPITAL | Age: 52
End: 2022-02-28

## 2022-02-28 ENCOUNTER — APPOINTMENT (OUTPATIENT)
Dept: BONE DENSITY | Facility: HOSPITAL | Age: 52
End: 2022-02-28

## 2022-03-24 ENCOUNTER — HOSPITAL ENCOUNTER (OUTPATIENT)
Dept: MAMMOGRAPHY | Facility: HOSPITAL | Age: 52
Discharge: HOME | End: 2022-03-24
Admitting: NURSE PRACTITIONER

## 2022-03-24 ENCOUNTER — APPOINTMENT (OUTPATIENT)
Dept: MAMMOGRAPHY | Facility: HOSPITAL | Age: 52
End: 2022-03-24

## 2022-03-24 DIAGNOSIS — Z12.31 ENCOUNTER FOR SCREENING MAMMOGRAM FOR BREAST CANCER: ICD-10-CM

## 2022-03-24 PROCEDURE — 77067 SCR MAMMO BI INCL CAD: CPT

## 2022-03-24 PROCEDURE — 77063 BREAST TOMOSYNTHESIS BI: CPT

## 2023-02-16 ENCOUNTER — TELEPHONE (OUTPATIENT)
Dept: OBSTETRICS AND GYNECOLOGY | Facility: CLINIC | Age: 53
End: 2023-02-16
Payer: COMMERCIAL

## 2023-02-16 NOTE — TELEPHONE ENCOUNTER
Called and spoke to pt and asked if she was still wanting to have DEXA done, pt states she did not want to have it done at this time.  Annual scheduled with Lynette 04/2023.

## 2023-02-20 RX ORDER — ESTRADIOL 0.5 MG/1
TABLET ORAL
Qty: 30 TABLET | Refills: 1 | Status: SHIPPED | OUTPATIENT
Start: 2023-02-20 | End: 2023-03-20

## 2023-03-15 DIAGNOSIS — F32.9 REACTIVE DEPRESSION: ICD-10-CM

## 2023-03-15 RX ORDER — BUPROPION HYDROCHLORIDE 300 MG/1
TABLET ORAL
Qty: 30 TABLET | Refills: 0 | Status: SHIPPED | OUTPATIENT
Start: 2023-03-15 | End: 2023-04-04 | Stop reason: SDUPTHER

## 2023-03-20 RX ORDER — ESTRADIOL 0.5 MG/1
TABLET ORAL
Qty: 30 TABLET | Refills: 0 | Status: SHIPPED | OUTPATIENT
Start: 2023-03-20 | End: 2023-04-04 | Stop reason: SDUPTHER

## 2023-04-04 ENCOUNTER — OFFICE VISIT (OUTPATIENT)
Dept: OBSTETRICS AND GYNECOLOGY | Facility: CLINIC | Age: 53
End: 2023-04-04
Payer: COMMERCIAL

## 2023-04-04 VITALS
DIASTOLIC BLOOD PRESSURE: 72 MMHG | HEIGHT: 66 IN | SYSTOLIC BLOOD PRESSURE: 104 MMHG | WEIGHT: 178 LBS | BODY MASS INDEX: 28.61 KG/M2

## 2023-04-04 DIAGNOSIS — C73 THYROID CANCER: ICD-10-CM

## 2023-04-04 DIAGNOSIS — Z13.820 ENCOUNTER FOR SCREENING FOR OSTEOPOROSIS: ICD-10-CM

## 2023-04-04 DIAGNOSIS — Z01.419 WELL WOMAN EXAM WITH ROUTINE GYNECOLOGICAL EXAM: Primary | ICD-10-CM

## 2023-04-04 DIAGNOSIS — N95.1 MENOPAUSAL SYMPTOMS: ICD-10-CM

## 2023-04-04 DIAGNOSIS — J98.51 FIBROSING MEDIASTINITIS: ICD-10-CM

## 2023-04-04 DIAGNOSIS — E55.9 VITAMIN D DEFICIENCY: ICD-10-CM

## 2023-04-04 DIAGNOSIS — F32.9 REACTIVE DEPRESSION: ICD-10-CM

## 2023-04-04 DIAGNOSIS — Z12.31 ENCOUNTER FOR SCREENING MAMMOGRAM FOR BREAST CANCER: ICD-10-CM

## 2023-04-04 PROCEDURE — 99396 PREV VISIT EST AGE 40-64: CPT | Performed by: NURSE PRACTITIONER

## 2023-04-04 RX ORDER — ESTRADIOL 0.5 MG/1
0.5 TABLET ORAL DAILY
Qty: 90 TABLET | Refills: 3 | Status: SHIPPED | OUTPATIENT
Start: 2023-04-04

## 2023-04-04 RX ORDER — BUPROPION HYDROCHLORIDE 300 MG/1
300 TABLET ORAL DAILY
Qty: 90 TABLET | Refills: 3 | Status: SHIPPED | OUTPATIENT
Start: 2023-04-04

## 2023-04-04 RX ORDER — TIRZEPATIDE 5 MG/.5ML
INJECTION, SOLUTION SUBCUTANEOUS
COMMUNITY
Start: 2023-03-17

## 2023-04-04 RX ORDER — ONDANSETRON 4 MG/1
TABLET, FILM COATED ORAL
COMMUNITY
Start: 2023-03-22

## 2023-04-04 NOTE — PROGRESS NOTES
"Subjective     Ines Rowland is a 52 y.o. female    History of Present Illness  Patient is here today for yearly checkup.  She has no complaints.  Gynecologic Exam  The patient's pertinent negatives include no pelvic pain, vaginal bleeding or vaginal discharge. The patient is experiencing no pain. Associated symptoms include constipation. Pertinent negatives include no abdominal pain, anorexia, back pain, chills, diarrhea, discolored urine, dysuria, fever, flank pain, frequency, headaches, hematuria, joint pain, joint swelling, nausea, painful intercourse, rash, sore throat, urgency or vomiting. She is sexually active. She uses hysterectomy for contraception. She is postmenopausal.         /72   Ht 167.6 cm (65.98\")   Wt 80.7 kg (178 lb)   LMP 10/12/2005 (Approximate) Comment: Pelvic pain  BMI 28.74 kg/m²     Outpatient Encounter Medications as of 4/4/2023   Medication Sig Dispense Refill   • albuterol (ProAir RespiClick) 108 (90 Base) MCG/ACT inhaler Inhale 2 puffs Every 4 (Four) Hours As Needed for Wheezing. 1 each 6   • ASPIRIN 81 PO Take  by mouth.     • buPROPion XL (WELLBUTRIN XL) 300 MG 24 hr tablet Take 1 tablet by mouth Daily. 90 tablet 3   • calcitriol (ROCALTROL) 0.5 MCG capsule      • cyclobenzaprine (FLEXERIL) 10 MG tablet Take 1 tablet by mouth 3 (Three) Times a Day As Needed for Muscle Spasms.     • estradiol (ESTRACE) 0.5 MG tablet Take 1 tablet by mouth Daily. 90 tablet 3   • levothyroxine (SYNTHROID, LEVOTHROID) 137 MCG tablet Take 1 tablet by mouth Daily.     • Mounjaro 5 MG/0.5ML solution pen-injector      • omeprazole (priLOSEC) 20 MG capsule      • ondansetron (ZOFRAN) 4 MG tablet      • [DISCONTINUED] buPROPion XL (WELLBUTRIN XL) 300 MG 24 hr tablet TAKE ONE TABLET BY MOUTH DAILY 30 tablet 0   • [DISCONTINUED] estradiol (ESTRACE) 0.5 MG tablet TAKE ONE TABLET BY MOUTH DAILY 30 tablet 0   • [DISCONTINUED] clobetasol (Temovate) 0.05 % cream Apply 1 application topically to the " appropriate area as directed 2 (Two) Times a Day. 30 g 3   • [DISCONTINUED] predniSONE (DELTASONE) 5 MG tablet Take 5 mg by mouth Daily.  0     No facility-administered encounter medications on file as of 4/4/2023.       Surgical History  Past Surgical History:   Procedure Laterality Date   • HYSTERECTOMY      without BSO   • KNEE ARTHROPLASTY     • LUNG SURGERY Right 12/02/2020    Stent placed in right lung   • TOTAL THYROIDECTOMY     • WISDOM TOOTH EXTRACTION         Family History  Family History   Problem Relation Age of Onset   • Prostate cancer Father 72   • Arthritis Mother    • Arthritis Brother    • Colon cancer Paternal Uncle 50   • Thyroid cancer Other    • No Known Problems Sister    • No Known Problems Daughter    • No Known Problems Son    • No Known Problems Maternal Grandmother    • No Known Problems Paternal Grandmother    • No Known Problems Maternal Aunt    • No Known Problems Paternal Aunt    • Breast cancer Neg Hx    • Ovarian cancer Neg Hx    • Uterine cancer Neg Hx    • Melanoma Neg Hx    • BRCA 1/2 Neg Hx    • Endometrial cancer Neg Hx        The following portions of the patient's history were reviewed and updated as appropriate: allergies, current medications, past family history, past medical history, past social history, past surgical history, and problem list.    Review of Systems   Constitutional: Negative for activity change, appetite change, chills, diaphoresis, fatigue, fever, unexpected weight gain and unexpected weight loss.   HENT: Negative for congestion, dental problem, drooling, ear discharge, ear pain, facial swelling, hearing loss, mouth sores, nosebleeds, postnasal drip, rhinorrhea, sinus pressure, sneezing, sore throat, swollen glands, tinnitus, trouble swallowing and voice change.    Eyes: Negative for blurred vision, double vision, photophobia, pain, discharge, redness, itching and visual disturbance.   Respiratory: Negative for apnea, cough, choking, chest tightness,  shortness of breath, wheezing and stridor.    Cardiovascular: Negative for chest pain, palpitations and leg swelling.   Gastrointestinal: Positive for constipation. Negative for abdominal distention, abdominal pain, anal bleeding, anorexia, blood in stool, diarrhea, nausea, rectal pain, vomiting, GERD and indigestion.   Endocrine: Negative for cold intolerance, heat intolerance, polydipsia, polyphagia and polyuria.   Genitourinary: Negative for amenorrhea, breast discharge, breast lump, breast pain, decreased libido, decreased urine volume, difficulty urinating, dyspareunia, dysuria, flank pain, frequency, genital sores, hematuria, menstrual problem, pelvic pain, pelvic pressure, urgency, urinary incontinence, vaginal bleeding, vaginal discharge and vaginal pain.   Musculoskeletal: Negative for arthralgias, back pain, gait problem, joint pain, joint swelling, myalgias, neck pain, neck stiffness and bursitis.   Skin: Negative for color change, dry skin and rash.   Allergic/Immunologic: Negative for environmental allergies, food allergies and immunocompromised state.   Neurological: Negative for dizziness, tremors, seizures, syncope, facial asymmetry, speech difficulty, weakness, light-headedness, numbness, headache, memory problem and confusion.   Hematological: Negative for adenopathy. Does not bruise/bleed easily.   Psychiatric/Behavioral: Negative for agitation, behavioral problems, decreased concentration, dysphoric mood, hallucinations, self-injury, sleep disturbance, suicidal ideas, negative for hyperactivity, depressed mood and stress. The patient is not nervous/anxious.        Objective   Physical Exam  Vitals and nursing note reviewed. Exam conducted with a chaperone present.   Constitutional:       General: She is not in acute distress.     Appearance: She is well-developed. She is not diaphoretic.   HENT:      Head: Normocephalic.      Right Ear: External ear normal.      Left Ear: External ear normal.       Nose: Nose normal.   Eyes:      General: No scleral icterus.        Right eye: No discharge.         Left eye: No discharge.      Conjunctiva/sclera: Conjunctivae normal.      Pupils: Pupils are equal, round, and reactive to light.   Neck:      Thyroid: No thyromegaly.      Vascular: No carotid bruit.      Trachea: No tracheal deviation.   Cardiovascular:      Rate and Rhythm: Normal rate and regular rhythm.      Heart sounds: Normal heart sounds. No murmur heard.  Pulmonary:      Effort: Pulmonary effort is normal. No respiratory distress.      Breath sounds: Normal breath sounds. No wheezing.   Chest:   Breasts:     Breasts are symmetrical.      Right: Normal. No swelling, bleeding, inverted nipple, mass, nipple discharge, skin change or tenderness.      Left: Normal. No swelling, bleeding, inverted nipple, mass, nipple discharge, skin change or tenderness.   Abdominal:      General: There is no distension.      Palpations: Abdomen is soft. There is no mass.      Tenderness: There is no abdominal tenderness. There is no right CVA tenderness, left CVA tenderness or guarding.      Hernia: No hernia is present. There is no hernia in the left inguinal area or right inguinal area.   Genitourinary:     General: Normal vulva.      Exam position: Lithotomy position.      Labia:         Right: No rash, tenderness, lesion or injury.         Left: No rash, tenderness, lesion or injury.       Vagina: Normal. No signs of injury and foreign body. No vaginal discharge, erythema, tenderness or bleeding.      Adnexa: Right adnexa normal and left adnexa normal.        Right: No mass, tenderness or fullness.          Left: No mass, tenderness or fullness.        Rectum: Normal. No mass.      Comments: Cervix and uterus are surgically absent  BSU normal  Urethral meatus  Normal  Perineum  Normal  Musculoskeletal:         General: No tenderness. Normal range of motion.      Cervical back: Normal range of motion and neck supple.    Lymphadenopathy:      Head:      Right side of head: No submental, submandibular, tonsillar, preauricular, posterior auricular or occipital adenopathy.      Left side of head: No submental, submandibular, tonsillar, preauricular, posterior auricular or occipital adenopathy.      Cervical: No cervical adenopathy.      Right cervical: No superficial, deep or posterior cervical adenopathy.     Left cervical: No superficial, deep or posterior cervical adenopathy.      Upper Body:      Right upper body: No supraclavicular, axillary or pectoral adenopathy.      Left upper body: No supraclavicular, axillary or pectoral adenopathy.      Lower Body: No right inguinal adenopathy. No left inguinal adenopathy.   Skin:     General: Skin is warm and dry.      Findings: No bruising, erythema or rash.   Neurological:      Mental Status: She is alert and oriented to person, place, and time.      Coordination: Coordination normal.   Psychiatric:         Mood and Affect: Mood normal.         Behavior: Behavior normal.         Thought Content: Thought content normal.         Judgment: Judgment normal.         Assessment & Plan   Diagnoses and all orders for this visit:    1. Well woman exam with routine gynecological exam (Primary)  Normal GYN exam. Will have lab work at PCP. Encouraged SBE, pt is aware how to do self breast exam and the importance of same. Discussed weight management and importance of maintaining a healthy weight. Discussed Vitamin D intake and the importance of adequate vitamin D for both Bone Health and a healthy immune system.  Discussed Daily exercise and the importance of same, in regards to a healthy heart as well as helping to maintain her weight and improving her mental health.  BMI 28.7.  Colonoscopy will be scheduled with gastro..  Mammogram and bone density will be scheduled at [facility].  Pap smear is not done per [facility]P guidelines.  -     Urine Culture - , Urine, Clean Catch  -     UA / M  With / Rflx Culture([facility] ONLY) - Urine, Clean Catch  -     Ambulatory Referral For Screening Colonoscopy    2. Encounter for screening mammogram for breast cancer  Comments:  Patient will have mammogram at [facility].  Orders:  -     Mammo Screening Digital Tomosynthesis Bilateral With CAD; Future    3. Encounter for screening for osteoporosis  Comments:  She will have a bone density at [facility].  Orders:  -     DEXA Bone Density Axial; Future    4. Thyroid cancer  Comments:  Patient has history of thyroid cancer.  She is followed by Dr. Panda.  He has recently done labs.  No nodules are noted today.    5. Reactive depression  Comments:  Pt. is doing well on Wellbutrin. Rf given.  Orders:  -     buPROPion XL (WELLBUTRIN XL) 300 MG 24 hr tablet; Take 1 tablet by mouth Daily.  Dispense: 90 tablet; Refill: 3    6. Vitamin D deficiency  Comments:  Patient will have labs done with her PCP.  Orders:  -     Vitamin D,25-Hydroxy    7. Fibrosing mediastinitis  Comments:  Patient is followed at [place].  She recently saw them.  States her lungs are stable.    8. Menopausal symptoms  Comments:  Patient has been out of her hormones.  She would like to resume it.  Orders:  -     estradiol (ESTRACE) 0.5 MG tablet; Take 1 tablet by mouth Daily.  Dispense: 90 tablet; Refill: 3         BMI is >= 25 and <30. (Overweight) The following options were offered after discussion;: weight loss educational material (shared in after visit summary), exercise counseling/recommendations and nutrition counseling/recommendations      Lynette Gallegos, ORACIO  4/4/2023

## 2023-04-27 ENCOUNTER — HOSPITAL ENCOUNTER (OUTPATIENT)
Dept: BONE DENSITY | Facility: HOSPITAL | Age: 53
Discharge: HOME | End: 2023-04-27
Payer: COMMERCIAL

## 2023-04-27 ENCOUNTER — HOSPITAL ENCOUNTER (OUTPATIENT)
Dept: MAMMOGRAPHY | Facility: HOSPITAL | Age: 53
Discharge: HOME | End: 2023-04-27
Payer: COMMERCIAL

## 2023-04-27 DIAGNOSIS — Z13.820 ENCOUNTER FOR SCREENING FOR OSTEOPOROSIS: ICD-10-CM

## 2023-04-27 DIAGNOSIS — Z12.31 ENCOUNTER FOR SCREENING MAMMOGRAM FOR BREAST CANCER: ICD-10-CM

## 2023-04-27 PROCEDURE — 77080 DXA BONE DENSITY AXIAL: CPT

## 2023-04-27 PROCEDURE — 77063 BREAST TOMOSYNTHESIS BI: CPT

## 2023-04-27 PROCEDURE — 77067 SCR MAMMO BI INCL CAD: CPT

## 2023-05-16 ENCOUNTER — OFFICE VISIT (OUTPATIENT)
Dept: GASTROENTEROLOGY | Facility: CLINIC | Age: 53
End: 2023-05-16
Payer: COMMERCIAL

## 2023-05-16 VITALS
BODY MASS INDEX: 28.16 KG/M2 | HEART RATE: 83 BPM | OXYGEN SATURATION: 98 % | DIASTOLIC BLOOD PRESSURE: 70 MMHG | WEIGHT: 169 LBS | HEIGHT: 65 IN | TEMPERATURE: 96.4 F | SYSTOLIC BLOOD PRESSURE: 112 MMHG

## 2023-05-16 DIAGNOSIS — Z12.11 ENCOUNTER FOR SCREENING FOR MALIGNANT NEOPLASM OF COLON: Primary | ICD-10-CM

## 2023-05-16 NOTE — PROGRESS NOTES
[facility]astroenterology    Primary Physician Chas Navarro MD    Referring Provider: Lynette NARAYANAN     5/16/2023    Ines Rowland   1970      Chief Complaint   Patient presents with    Colonoscopy       Subjective     HPI    Ines Rowland is a 52 y.o. female who presents as a referral for preventative maintenance. She has no complaints of nausea or vomiting. No change in bowels. No wt loss. No BRBPR. No melena. No abdominal pain.       No history of colonoscopy.   No family history of colon cancer/polyps.     Past Medical History:   Diagnosis Date    Abnormal Pap smear of cervix     Cervical dysplasia     Disease of thyroid gland     Thyroid cancer        Past Surgical History:   Procedure Laterality Date    HYSTERECTOMY      without BSO    KNEE ARTHROPLASTY      LUNG SURGERY Right 12/02/2020    Stent placed in right lung    TOTAL THYROIDECTOMY      WISDOM TOOTH EXTRACTION         Outpatient Medications Marked as Taking for the 5/16/23 encounter (Office Visit) with Darby Taylor APRN   Medication Sig Dispense Refill    albuterol (ProAir RespiClick) 108 (90 Base) MCG/ACT inhaler Inhale 2 puffs Every 4 (Four) Hours As Needed for Wheezing. 1 each 6    ASPIRIN 81 PO Take  by mouth.      buPROPion XL (WELLBUTRIN XL) 300 MG 24 hr tablet Take 1 tablet by mouth Daily. 90 tablet 3    calcitriol (ROCALTROL) 0.5 MCG capsule       estradiol (ESTRACE) 0.5 MG tablet Take 1 tablet by mouth Daily. 90 tablet 3    levothyroxine (SYNTHROID, LEVOTHROID) 137 MCG tablet Take 1 tablet by mouth Daily.      Mounjaro 5 MG/0.5ML solution pen-injector       omeprazole (priLOSEC) 20 MG capsule 1 capsule Daily.      ondansetron (ZOFRAN) 4 MG tablet As Needed.         No Known Allergies    Social History     Socioeconomic History    Marital status:    Tobacco Use    Smoking status: Former    Smokeless tobacco: Never   Substance and Sexual Activity    Alcohol use: Yes     Comment: occ    Drug use: No     Sexual activity: Yes     Birth control/protection: Post-menopausal       Family History   Problem Relation Age of Onset    Prostate cancer Father 72    Arthritis Mother     Arthritis Brother     Colon cancer Paternal Uncle 50    Thyroid cancer Other     No Known Problems Sister     No Known Problems Daughter     No Known Problems Son     No Known Problems Maternal Grandmother     No Known Problems Paternal Grandmother     No Known Problems Maternal Aunt     No Known Problems Paternal Aunt     Breast cancer Neg Hx     Ovarian cancer Neg Hx     Uterine cancer Neg Hx     Melanoma Neg Hx     BRCA 1/2 Neg Hx     Endometrial cancer Neg Hx        Review of Systems   Constitutional:  Negative for chills, fever and unexpected weight change.   Respiratory:  Negative for shortness of breath.    Cardiovascular:  Negative for chest pain.   Gastrointestinal:  Negative for abdominal distention, abdominal pain, anal bleeding, blood in stool, constipation, diarrhea, nausea and vomiting.     Objective     Vitals:    05/16/23 0757   BP: 112/70   Pulse: 83   Temp: 96.4 °F (35.8 °C)   SpO2: 98%         05/16/23 0757   Weight: 76.7 kg (169 lb)     Body mass index is 28.12 kg/m².    Physical Exam  Vitals reviewed.   Constitutional:       General: She is not in acute distress.  Cardiovascular:      Rate and Rhythm: Normal rate and regular rhythm.      Heart sounds: Normal heart sounds.   Pulmonary:      Effort: Pulmonary effort is normal.      Breath sounds: Normal breath sounds.   Abdominal:      General: Bowel sounds are normal. There is no distension.      Palpations: Abdomen is soft.      Tenderness: There is no abdominal tenderness.   Skin:     General: Skin is warm and dry.   Neurological:      Mental Status: She is alert.       Imaging Results (Most Recent)       None            Assessment & Plan     Diagnoses and all orders for this visit:    1. Encounter for screening for malignant neoplasm of colon (Primary)  -     Case  Request; Standing  -     Case Request    Other orders  -     Implement Anesthesia Orders Day of Procedure; Standing  -     Obtain Informed Consent; Standing      Schedule colonoscopy. Use miralax prep.                COLONOSCOPY WITH ANESTHESIA (N/A)  All risks, benefits, alternatives, and indications of colonoscopy procedure have been discussed with the patient. Risks to include perforation of the colon requiring possible surgery or colostomy, risk of bleeding from biopsies or removal of colon tissue, possibility of missing a colon polyp or cancer, or adverse drug reaction.  Benefits to include the diagnosis and management of disease of the colon and rectum. Alternatives to include barium enema, radiographic evaluation, lab testing or no intervention. Pt verbalizes understanding and agrees.     There are no Patient Instructions on file for this visit.    ORACIO Vanegas

## 2023-12-12 ENCOUNTER — PATIENT MESSAGE (OUTPATIENT)
Dept: OBSTETRICS AND GYNECOLOGY | Facility: CLINIC | Age: 53
End: 2023-12-12
Payer: COMMERCIAL

## 2024-01-05 DIAGNOSIS — F32.9 REACTIVE DEPRESSION: ICD-10-CM

## 2024-01-05 RX ORDER — BUPROPION HYDROCHLORIDE 300 MG/1
300 TABLET ORAL DAILY
Qty: 90 TABLET | Refills: 3 | OUTPATIENT
Start: 2024-01-05

## 2024-01-23 DIAGNOSIS — N95.1 MENOPAUSAL SYMPTOMS: ICD-10-CM

## 2024-01-23 RX ORDER — ESTRADIOL 0.5 MG/1
0.5 TABLET ORAL DAILY
Qty: 90 TABLET | Refills: 3 | OUTPATIENT
Start: 2024-01-23

## 2024-03-04 ENCOUNTER — OFFICE VISIT (OUTPATIENT)
Dept: GASTROENTEROLOGY | Facility: CLINIC | Age: 54
End: 2024-03-04
Payer: COMMERCIAL

## 2024-03-04 ENCOUNTER — LAB (OUTPATIENT)
Dept: LAB | Facility: HOSPITAL | Age: 54
End: 2024-03-04
Payer: COMMERCIAL

## 2024-03-04 VITALS
HEIGHT: 65 IN | TEMPERATURE: 97.5 F | WEIGHT: 154 LBS | HEART RATE: 68 BPM | OXYGEN SATURATION: 98 % | SYSTOLIC BLOOD PRESSURE: 112 MMHG | BODY MASS INDEX: 25.66 KG/M2 | DIASTOLIC BLOOD PRESSURE: 70 MMHG

## 2024-03-04 DIAGNOSIS — R79.89 ABNORMAL LFTS: Primary | ICD-10-CM

## 2024-03-04 DIAGNOSIS — R79.89 ABNORMAL LFTS: ICD-10-CM

## 2024-03-04 LAB
HAV IGM SERPL QL IA: NORMAL
HBV CORE IGM SERPL QL IA: NORMAL
HBV SURFACE AG SERPL QL IA: NORMAL
HCV AB SER DONR QL: NORMAL

## 2024-03-04 PROCEDURE — 84466 ASSAY OF TRANSFERRIN: CPT

## 2024-03-04 PROCEDURE — 82728 ASSAY OF FERRITIN: CPT

## 2024-03-04 PROCEDURE — 82390 ASSAY OF CERULOPLASMIN: CPT

## 2024-03-04 PROCEDURE — 86038 ANTINUCLEAR ANTIBODIES: CPT

## 2024-03-04 PROCEDURE — 83540 ASSAY OF IRON: CPT

## 2024-03-04 PROCEDURE — 86381 MITOCHONDRIAL ANTIBODY EACH: CPT

## 2024-03-04 PROCEDURE — 99213 OFFICE O/P EST LOW 20 MIN: CPT | Performed by: NURSE PRACTITIONER

## 2024-03-04 PROCEDURE — 86015 ACTIN ANTIBODY EACH: CPT

## 2024-03-04 PROCEDURE — 80074 ACUTE HEPATITIS PANEL: CPT

## 2024-03-04 PROCEDURE — 36415 COLL VENOUS BLD VENIPUNCTURE: CPT

## 2024-03-04 PROCEDURE — 82103 ALPHA-1-ANTITRYPSIN TOTAL: CPT

## 2024-03-04 PROCEDURE — 80076 HEPATIC FUNCTION PANEL: CPT

## 2024-03-04 RX ORDER — TIRZEPATIDE 5 MG/.5ML
INJECTION, SOLUTION SUBCUTANEOUS
COMMUNITY

## 2024-03-04 RX ORDER — LEVOTHYROXINE SODIUM 0.12 MG/1
125 TABLET ORAL DAILY
COMMUNITY

## 2024-03-04 NOTE — PROGRESS NOTES
[facility] GASTROENTEROLOGY - OFFICE NOTE    3/4/2024    Ines Rowland   1970    Primary Physician: Chas Navarro MD    Chief Complaint   Patient presents with    Abnormal Lab         HISTORY OF PRESENT ILLNESS:     Ines Rowland is a 53 y.o. female presents  with abnormal lft's.  In review of labs in Epic, this has been noted over the last 3 years.  No history if iv drug use, tattoos, or blood transfusions. Occasional alcohol. No fever or jaundice. No abdominal pain. No n/v. No family history of liver disease.       Has lost total of 50 # since 8/2022, intentional.              Review of labs show abn lft for at least 3 years.        Past Medical History:   Diagnosis Date    Abnormal Pap smear of cervix     Asthma     Cervical dysplasia     Disease of thyroid gland     Thyroid cancer        Past Surgical History:   Procedure Laterality Date    COLONOSCOPY N/A 7/11/2023    Procedure: COLONOSCOPY WITH ANESTHESIA;  Surgeon: Shawn Harvey MD;  Location: [facility] ENDOSCOPY;  Service: Gastroenterology;  Laterality: N/A;  preop: Encounter for screening for malignant neoplasm of colon  post op: normal  PCP: Chas Navarro MD    HYSTERECTOMY      without BSO    KNEE ARTHROPLASTY      LUNG SURGERY Right 12/02/2020    Stent placed in right lung    TOTAL THYROIDECTOMY      WISDOM TOOTH EXTRACTION         Outpatient Medications Marked as Taking for the 3/4/24 encounter (Office Visit) with Darby Taylor APRN   Medication Sig Dispense Refill    albuterol (ProAir RespiClick) 108 (90 Base) MCG/ACT inhaler Inhale 2 puffs Every 4 (Four) Hours As Needed for Wheezing. 1 each 6    ASPIRIN 81 PO Take  by mouth.      buPROPion XL (WELLBUTRIN XL) 300 MG 24 hr tablet Take 1 tablet by mouth Daily. 90 tablet 3    calcitriol (ROCALTROL) 0.5 MCG capsule       estradiol (ESTRACE) 0.5 MG tablet Take 1 tablet by mouth Daily. 90 tablet 3    levothyroxine (SYNTHROID, LEVOTHROID) 125 MCG tablet Take 1 tablet by  "mouth Daily.      ondansetron (ZOFRAN) 4 MG tablet As Needed.      Zepbound 5 MG/0.5ML solution auto-injector Every 30 (Thirty) Days.         No Known Allergies    Social History     Socioeconomic History    Marital status:    Tobacco Use    Smoking status: Former    Smokeless tobacco: Never   Vaping Use    Vaping status: Never Used   Substance and Sexual Activity    Alcohol use: Yes     Comment: occ    Drug use: No    Sexual activity: Defer       Family History   Problem Relation Age of Onset    Prostate cancer Father 72    Arthritis Mother     Arthritis Brother     Colon cancer Paternal Uncle 50    Thyroid cancer Other     No Known Problems Sister     No Known Problems Daughter     No Known Problems Son     No Known Problems Maternal Grandmother     No Known Problems Paternal Grandmother     No Known Problems Maternal Aunt     No Known Problems Paternal Aunt     Breast cancer Neg Hx     Ovarian cancer Neg Hx     Uterine cancer Neg Hx     Melanoma Neg Hx     BRCA 1/2 Neg Hx     Endometrial cancer Neg Hx        Review of Systems   Constitutional:  Negative for chills, fever and unexpected weight change.   Respiratory:  Negative for shortness of breath.    Cardiovascular:  Negative for chest pain.   Gastrointestinal:  Negative for abdominal distention, abdominal pain, anal bleeding, blood in stool, constipation, diarrhea, nausea and vomiting.        Vitals:    03/04/24 1313   BP: 112/70   Pulse: 68   Temp: 97.5 °F (36.4 °C)   SpO2: 98%   Weight: 69.9 kg (154 lb)   Height: 165.1 cm (65\")      Body mass index is 25.63 kg/m².    Physical Exam  Vitals reviewed.   Constitutional:       General: She is not in acute distress.  Cardiovascular:      Rate and Rhythm: Normal rate and regular rhythm.      Heart sounds: Normal heart sounds.   Pulmonary:      Effort: Pulmonary effort is normal.      Breath sounds: Normal breath sounds.   Abdominal:      General: Bowel sounds are normal. There is no distension.      " Palpations: Abdomen is soft.      Tenderness: There is no abdominal tenderness.   Skin:     General: Skin is warm and dry.   Neurological:      Mental Status: She is alert.                 ASSESSMENT AND PLAN    Assessment & Plan     Diagnoses and all orders for this visit:    1. Abnormal LFTs (Primary)  -     Alpha - 1 - Antitrypsin; Future  -     Anti-Smooth Muscle Antibody Titer; Future  -     Ferritin; Future  -     Ceruloplasmin; Future  -     Hepatic Function Panel; Future  -     Hepatitis Panel, Acute; Future  -     Iron Profile; Future  -     Mitochondrial Antibodies, M2; Future  -     MARIA DEL ROSARIO by IFA, Reflex to Titer and Pattern; Future  -     US Liver; Future      Differential diagnoses discussed.   I recommend check liver serologies and ultrasound liver.   I recommend no alcohol, healthy diet, exercise, keep glucose/cholesterol under control.   Will see her back in office 3 months.           * Surgery not found *           Return in about 3 months (around 6/4/2024).          There are no Patient Instructions on file for this visit.      ORACIO Vanegas

## 2024-03-05 LAB
ALBUMIN SERPL-MCNC: 4.1 G/DL (ref 3.5–5.2)
ALP SERPL-CCNC: 108 U/L (ref 39–117)
ALPHA1 GLOB MFR UR ELPH: 158 MG/DL (ref 90–200)
ALT SERPL W P-5'-P-CCNC: 29 U/L (ref 1–33)
AST SERPL-CCNC: 23 U/L (ref 1–32)
BILIRUB CONJ SERPL-MCNC: <0.2 MG/DL (ref 0–0.3)
BILIRUB INDIRECT SERPL-MCNC: NORMAL MG/DL
BILIRUB SERPL-MCNC: 0.2 MG/DL (ref 0–1.2)
CERULOPLASMIN SERPL-MCNC: 33 MG/DL (ref 19–39)
FERRITIN SERPL-MCNC: 128 NG/ML (ref 13–150)
IRON 24H UR-MRATE: 66 MCG/DL (ref 37–145)
IRON SATN MFR SERPL: 23 % (ref 20–50)
MITOCHONDRIA M2 IGG SER-ACNC: <20 UNITS (ref 0–20)
PROT SERPL-MCNC: 7.7 G/DL (ref 6–8.5)
SMA IGG SER-ACNC: 7 UNITS (ref 0–19)
TIBC SERPL-MCNC: 292 MCG/DL (ref 298–536)
TRANSFERRIN SERPL-MCNC: 196 MG/DL (ref 200–360)

## 2024-03-06 LAB
ANA SER QL IF: POSITIVE
ANA SPECKLED TITR SER: ABNORMAL {TITER}
Lab: ABNORMAL

## 2024-03-08 ENCOUNTER — TELEPHONE (OUTPATIENT)
Dept: GASTROENTEROLOGY | Facility: CLINIC | Age: 54
End: 2024-03-08
Payer: COMMERCIAL

## 2024-03-08 NOTE — TELEPHONE ENCOUNTER
Please let patient know that all labs were good except MARIA DEL ROSARIO was positive. Without any of the other labs I ordered being abnormal then I would recommend defer positive MARIA DEL ROSARIO to her pcp. A positive MARIA DEL ROSARIO can be a sign of an autoimmune disease.

## 2024-03-11 ENCOUNTER — TELEPHONE (OUTPATIENT)
Dept: GASTROENTEROLOGY | Facility: CLINIC | Age: 54
End: 2024-03-11
Payer: COMMERCIAL

## 2024-03-11 NOTE — TELEPHONE ENCOUNTER
Yes I recommend keep appointment 6-6-24 with Dr. Harvey. I also ordered ultrasound liver and that needs to be scheduled, please help her get that done. Thank you             ----- Message from Arielle Westfall MA sent at 3/11/2024  8:32 AM CDT -----  Regarding: FW: Follow up   Contact: [phone]    ----- Message -----  From: Ines Rowland  Sent: 3/11/2024   7:50 AM CDT  To: w [facility] Clinical Pool  Subject: Follow up                                        Do I still need to see Dr Harvey in 3 months since my MARIA DEL ROSARIO was abnormal and I’m being referred back to my general doctor? It doesn’t appear I will be scheduled for the ultrasound either? My chart still indicates I need the ultrasound and my next appointment hasn’t been cancelled. Just clarifying. Thanks

## 2024-03-19 ENCOUNTER — HOSPITAL ENCOUNTER (OUTPATIENT)
Dept: ULTRASOUND IMAGING | Facility: HOSPITAL | Age: 54
Discharge: HOME | End: 2024-03-19
Admitting: NURSE PRACTITIONER
Payer: COMMERCIAL

## 2024-03-19 DIAGNOSIS — R79.89 ABNORMAL LFTS: Primary | ICD-10-CM

## 2024-03-19 DIAGNOSIS — R79.89 ABNORMAL LFTS: ICD-10-CM

## 2024-03-19 PROCEDURE — 76705 ECHO EXAM OF ABDOMEN: CPT

## 2024-04-05 ENCOUNTER — TELEPHONE (OUTPATIENT)
Dept: OBSTETRICS AND GYNECOLOGY | Age: 54
End: 2024-04-05

## 2024-04-05 DIAGNOSIS — N95.1 MENOPAUSAL SYMPTOMS: ICD-10-CM

## 2024-04-05 DIAGNOSIS — F32.9 REACTIVE DEPRESSION: ICD-10-CM

## 2024-04-05 RX ORDER — ESTRADIOL 0.5 MG/1
0.5 TABLET ORAL DAILY
Qty: 30 TABLET | Refills: 1 | Status: SHIPPED | OUTPATIENT
Start: 2024-04-05

## 2024-04-05 RX ORDER — BUPROPION HYDROCHLORIDE 300 MG/1
300 TABLET ORAL DAILY
Qty: 30 TABLET | Refills: 1 | Status: SHIPPED | OUTPATIENT
Start: 2024-04-05

## 2024-04-05 NOTE — TELEPHONE ENCOUNTER
Caller: Ines Rowland    Relationship: Self    Best call back number: [phone]    Requested Prescriptions:   buPROPion XL (WELLBUTRIN XL) 300 MG 24 hr tablet [[id number]] (Order [id number])     estradiol (ESTRACE) 0.5 MG tablet [[id number]] (Order [id number])     Requested Prescriptions      No prescriptions requested or ordered in this encounter        Pharmacy where request should be sent:  [facility]    Last office visit with prescribing clinician: 4/4/2023   Last telemedicine visit with prescribing clinician: Visit date not found   Next office visit with prescribing clinician: 5/20/2024     Additional details provided by patient: PT HAD TO RESCHEDULE HER APPT DUE TO A WORK CONFLICT AND WILL NEED THESE 2 MEDS EXTENDED TO GET HER THRU TO NEXT APPT IN MAY    Does the patient have less than a 3 day supply:  [] Yes  [x] No    Would you like a call back once the refill request has been completed: [] Yes [x] No    If the office needs to give you a call back, can they leave a voicemail: [] Yes [x] No    Ceferino Junior Rep   04/05/24 08:09 CDT

## 2024-05-09 DIAGNOSIS — F32.9 REACTIVE DEPRESSION: ICD-10-CM

## 2024-05-09 RX ORDER — BUPROPION HYDROCHLORIDE 300 MG/1
300 TABLET ORAL DAILY
Qty: 30 TABLET | Refills: 1 | OUTPATIENT
Start: 2024-05-09

## 2024-05-30 ENCOUNTER — TELEPHONE (OUTPATIENT)
Dept: GASTROENTEROLOGY | Facility: CLINIC | Age: 54
End: 2024-05-30
Payer: COMMERCIAL

## 2024-06-06 ENCOUNTER — OFFICE VISIT (OUTPATIENT)
Dept: GASTROENTEROLOGY | Facility: CLINIC | Age: 54
End: 2024-06-06
Payer: COMMERCIAL

## 2024-06-06 VITALS
BODY MASS INDEX: 24.91 KG/M2 | DIASTOLIC BLOOD PRESSURE: 76 MMHG | HEIGHT: 66 IN | OXYGEN SATURATION: 95 % | WEIGHT: 155 LBS | SYSTOLIC BLOOD PRESSURE: 130 MMHG | HEART RATE: 80 BPM | TEMPERATURE: 97.3 F

## 2024-06-06 DIAGNOSIS — R79.89 ABNORMAL LFTS: Primary | ICD-10-CM

## 2024-06-06 PROCEDURE — 99213 OFFICE O/P EST LOW 20 MIN: CPT | Performed by: INTERNAL MEDICINE

## 2024-06-06 NOTE — PROGRESS NOTES
[facility] Gastroenterology    Chief Complaint   Patient presents with    Follow-up     Abnormal labs       Subjective     HPI    Ines Rowland is a 53 y.o. female who presents with a chief complaint of abnormal LFTs    She saw Darby for abnormal LFTs back in March.  Serologies were obtained which were unremarkable other than a positive MARIA DEL ROSARIO.  LFTs in in March and again in May were within normal limits.  Ultrasound of her liver in March was within normal limits.  Today she show me a copy of her liver serologies dating back a couple years.  You can see fluctuations in them but they have trended down.    She notes that she is lost 50 pounds.  She has been working on losing weight.  She is using Mounjaro now once a month to help maintain keeping the weight off.  She is exercising.    She denies any GI symptoms.      She had a screening colonoscopy in 2023 which was unremarkable.  Screening suggested 10 years later.  ======== copy March 4, 2024 HPI by Darby====================  HISTORY OF PRESENT ILLNESS:      Ines Rowland is a 53 y.o. female presents  with abnormal lft's.  In review of labs in Epic, this has been noted over the last 3 years.  No history if iv drug use, tattoos, or blood transfusions. Occasional alcohol. No fever or jaundice. No abdominal pain. No n/v. No family history of liver disease.         Has lost total of 50 # since 8/2022, intentional.                    Review of labs show abn lft for at least 3 years.                Past Medical History:   Diagnosis Date    Abnormal Pap smear of cervix     Asthma     Cervical dysplasia     Disease of thyroid gland     Thyroid cancer        Past Surgical History:   Procedure Laterality Date    COLONOSCOPY N/A 7/11/2023    Procedure: COLONOSCOPY WITH ANESTHESIA;  Surgeon: Shawn Harvey MD;  Location: [facility] ENDOSCOPY;  Service: Gastroenterology;  Laterality: N/A;  preop: Encounter for screening for malignant neoplasm of colon  post op:  normal  PCP: Chas Navarro MD    HYSTERECTOMY      without BSO    KNEE ARTHROPLASTY      LUNG SURGERY Right 12/02/2020    Stent placed in right lung    TOTAL THYROIDECTOMY      WISDOM TOOTH EXTRACTION           Current Outpatient Medications:     albuterol (ProAir RespiClick) 108 (90 Base) MCG/ACT inhaler, Inhale 2 puffs Every 4 (Four) Hours As Needed for Wheezing., Disp: 1 each, Rfl: 6    ASPIRIN 81 PO, Take  by mouth., Disp: , Rfl:     buPROPion XL (WELLBUTRIN XL) 300 MG 24 hr tablet, Take 1 tablet by mouth Daily., Disp: 30 tablet, Rfl: 1    calcitriol (ROCALTROL) 0.5 MCG capsule, , Disp: , Rfl:     estradiol (ESTRACE) 0.5 MG tablet, Take 1 tablet by mouth Daily., Disp: 30 tablet, Rfl: 1    ondansetron (ZOFRAN) 4 MG tablet, As Needed., Disp: , Rfl:     Zepbound 5 MG/0.5ML solution auto-injector, Every 30 (Thirty) Days., Disp: , Rfl:     cyclobenzaprine (FLEXERIL) 10 MG tablet, Take 1 tablet by mouth 3 (Three) Times a Day As Needed for Muscle Spasms., Disp: , Rfl:     levothyroxine (SYNTHROID, LEVOTHROID) 125 MCG tablet, Take 1 tablet by mouth Daily., Disp: , Rfl:     levothyroxine (SYNTHROID, LEVOTHROID) 137 MCG tablet, Take 1 tablet by mouth Daily., Disp: , Rfl:     Mounjaro 5 MG/0.5ML solution pen-injector, , Disp: , Rfl:     omeprazole (priLOSEC) 20 MG capsule, 1 capsule Daily., Disp: , Rfl:     No Known Allergies    Social History     Socioeconomic History    Marital status:    Tobacco Use    Smoking status: Former    Smokeless tobacco: Never   Vaping Use    Vaping status: Never Used   Substance and Sexual Activity    Alcohol use: Yes     Comment: occ    Drug use: No    Sexual activity: Defer       Family History   Problem Relation Age of Onset    Prostate cancer Father 72    Arthritis Mother     Arthritis Brother     Colon cancer Paternal Uncle 50    Thyroid cancer Other     No Known Problems Sister     No Known Problems Daughter     No Known Problems Son     No Known Problems Maternal  Grandmother     No Known Problems Paternal Grandmother     No Known Problems Maternal Aunt     No Known Problems Paternal Aunt     Breast cancer Neg Hx     Ovarian cancer Neg Hx     Uterine cancer Neg Hx     Melanoma Neg Hx     BRCA 1/2 Neg Hx     Endometrial cancer Neg Hx        Review of Systems  No abdominal pains    Objective     Vitals:    06/06/24 1401   BP: 130/76   Pulse: 80   Temp: 97.3 °F (36.3 °C)   SpO2: 95%       Physical Exam  Vitals reviewed.   Constitutional:       Appearance: Normal appearance. She is not ill-appearing or diaphoretic.   Pulmonary:      Effort: Pulmonary effort is normal.   Neurological:      Mental Status: She is alert.   Psychiatric:         Thought Content: Thought content normal.         Judgment: Judgment normal.               Assessment & Plan   Problem List Items Addressed This Visit          Gastrointestinal Abdominal     Abnormal LFTs - Primary         My suspicion is that her LFTs were secondary to fatty infiltration of the liver.  You can see the correlation with her abnormal LFTs and her weight loss.  We talked about fatty liver.  We talked about how it can lead to cirrhosis of the liver.  We talked about patients with fatty liver also at increased risk for cardiovascular disease.  We discussed that the mainstay of treatment for fatty liver is weight loss.  Fortunately, she is already lost the weight and 1 can see the benefit with normalization of LFTs.  As we discussed, she needs to maintain this healthy lifestyle and healthy weight otherwise she risk the return of the fatty liver.  We did talk about the option of elastography in detail.  At this time with normalization of LFTs I do not believe it would change recommendations thus we will not proceed and she is in agreement.  I commended her for her changes that she has done to improve her health.  At this time I suggest no further GI investigation.  She will continue to have her LFTs monitored by her PCP.  She will come  back and see us as needed.    Continue ongoing management by primary care provider and other specialists.     Body mass index is 25.02 kg/m².  Normal BMI      EMR Dragon/transcription disclaimer:  Much of this encounter note is electronic transcription/translation of spoken language to printed text.  The electronic translation of spoken language may be erroneous, or at times, nonsensical words or phrases may be inadvertently transcribed.  Although I have reviewed the note for such errors, some may still exist.    Shawn Harvey MD  15:27 CDT  06/06/24

## 2024-06-13 DIAGNOSIS — N95.1 MENOPAUSAL SYMPTOMS: ICD-10-CM

## 2024-06-13 DIAGNOSIS — F32.9 REACTIVE DEPRESSION: ICD-10-CM

## 2024-06-13 RX ORDER — ESTRADIOL 0.5 MG/1
0.5 TABLET ORAL DAILY
Qty: 30 TABLET | Refills: 1 | Status: SHIPPED | OUTPATIENT
Start: 2024-06-13

## 2024-06-13 RX ORDER — BUPROPION HYDROCHLORIDE 300 MG/1
300 TABLET ORAL DAILY
Qty: 30 TABLET | Refills: 1 | Status: SHIPPED | OUTPATIENT
Start: 2024-06-13

## 2024-07-20 DIAGNOSIS — F32.9 REACTIVE DEPRESSION: ICD-10-CM

## 2024-07-22 RX ORDER — BUPROPION HYDROCHLORIDE 300 MG/1
300 TABLET ORAL DAILY
Qty: 30 TABLET | Refills: 0 | Status: SHIPPED | OUTPATIENT
Start: 2024-07-22 | End: 2024-07-26 | Stop reason: SDUPTHER

## 2024-07-26 ENCOUNTER — OFFICE VISIT (OUTPATIENT)
Dept: OBSTETRICS AND GYNECOLOGY | Age: 54
End: 2024-07-26
Payer: COMMERCIAL

## 2024-07-26 VITALS
BODY MASS INDEX: 24.43 KG/M2 | HEIGHT: 66 IN | DIASTOLIC BLOOD PRESSURE: 78 MMHG | SYSTOLIC BLOOD PRESSURE: 108 MMHG | WEIGHT: 152 LBS

## 2024-07-26 DIAGNOSIS — R53.83 OTHER FATIGUE: ICD-10-CM

## 2024-07-26 DIAGNOSIS — E53.8 VITAMIN B 12 DEFICIENCY: ICD-10-CM

## 2024-07-26 DIAGNOSIS — Z01.419 WELL WOMAN EXAM WITH ROUTINE GYNECOLOGICAL EXAM: Primary | ICD-10-CM

## 2024-07-26 DIAGNOSIS — J98.51 FIBROSING MEDIASTINITIS: ICD-10-CM

## 2024-07-26 DIAGNOSIS — E55.9 VITAMIN D DEFICIENCY: ICD-10-CM

## 2024-07-26 DIAGNOSIS — C73 THYROID CANCER: ICD-10-CM

## 2024-07-26 DIAGNOSIS — F32.9 REACTIVE DEPRESSION: ICD-10-CM

## 2024-07-26 DIAGNOSIS — N95.1 MENOPAUSAL SYMPTOMS: ICD-10-CM

## 2024-07-26 DIAGNOSIS — Z12.31 ENCOUNTER FOR SCREENING MAMMOGRAM FOR BREAST CANCER: ICD-10-CM

## 2024-07-26 RX ORDER — LEVOTHYROXINE SODIUM 0.15 MG/1
1 TABLET ORAL DAILY
COMMUNITY
Start: 2024-05-24

## 2024-07-26 RX ORDER — ESTRADIOL 0.5 MG/1
0.5 TABLET ORAL DAILY
Qty: 90 TABLET | Refills: 3 | Status: SHIPPED | OUTPATIENT
Start: 2024-07-26

## 2024-07-26 RX ORDER — BUPROPION HYDROCHLORIDE 300 MG/1
300 TABLET ORAL DAILY
Qty: 90 TABLET | Refills: 3 | Status: SHIPPED | OUTPATIENT
Start: 2024-07-26

## 2024-07-26 NOTE — PROGRESS NOTES
"Subjective     Ines Rowland is a 53 y.o. female    History of Present Illness  Patient is here today for yearly checkup.  She has no complaints.  She does have an appointment at [facility] next month for a stent to be placed in her pulmonary vein.  This is second time she has had to have this done.  Denies any symptoms.  Gynecologic Exam  The patient's pertinent negatives include no pelvic pain, vaginal bleeding or vaginal discharge. The patient is experiencing no pain. Pertinent negatives include no abdominal pain, anorexia, back pain, chills, constipation, diarrhea, discolored urine, dysuria, fever, flank pain, frequency, headaches, hematuria, joint pain, joint swelling, nausea, painful intercourse, rash, sore throat, urgency or vomiting. She is sexually active. She uses hysterectomy for contraception. She is postmenopausal.         /78   Ht 167.6 cm (65.98\")   Wt 68.9 kg (152 lb)   LMP 10/12/2005 (Approximate) Comment: Pelvic pain  BMI 24.55 kg/m²     Outpatient Encounter Medications as of 7/26/2024   Medication Sig Dispense Refill    albuterol (ProAir RespiClick) 108 (90 Base) MCG/ACT inhaler Inhale 2 puffs Every 4 (Four) Hours As Needed for Wheezing. 1 each 6    ASPIRIN 81 PO Take  by mouth.      buPROPion XL (WELLBUTRIN XL) 300 MG 24 hr tablet Take 1 tablet by mouth Daily. 90 tablet 3    calcitriol (ROCALTROL) 0.5 MCG capsule       estradiol (ESTRACE) 0.5 MG tablet Take 1 tablet by mouth Daily. 90 tablet 3    levothyroxine (SYNTHROID, LEVOTHROID) 150 MCG tablet Take 1 tablet by mouth Daily.      ondansetron (ZOFRAN) 4 MG tablet As Needed.      Zepbound 5 MG/0.5ML solution auto-injector Every 30 (Thirty) Days.      [DISCONTINUED] buPROPion XL (WELLBUTRIN XL) 300 MG 24 hr tablet Take 1 tablet by mouth Daily. 30 tablet 0    [DISCONTINUED] estradiol (ESTRACE) 0.5 MG tablet Take 1 tablet by mouth Daily. 30 tablet 1    [DISCONTINUED] cyclobenzaprine (FLEXERIL) 10 MG tablet Take 1 tablet by mouth 3 " (Three) Times a Day As Needed for Muscle Spasms.      [DISCONTINUED] levothyroxine (SYNTHROID, LEVOTHROID) 125 MCG tablet Take 1 tablet by mouth Daily.      [DISCONTINUED] levothyroxine (SYNTHROID, LEVOTHROID) 137 MCG tablet Take 1 tablet by mouth Daily.      [DISCONTINUED] Mounjaro 5 MG/0.5ML solution pen-injector       [DISCONTINUED] omeprazole (priLOSEC) 20 MG capsule 1 capsule Daily.       No facility-administered encounter medications on file as of 7/26/2024.       Past Medical History  Past Medical History:   Diagnosis Date    Abnormal Pap smear of cervix     Asthma     Cervical dysplasia     Disease of thyroid gland     Thyroid cancer        Surgical History  Past Surgical History:   Procedure Laterality Date    COLONOSCOPY N/A 7/11/2023    Procedure: COLONOSCOPY WITH ANESTHESIA;  Surgeon: Shawn Harvey MD;  Location: [facility] ENDOSCOPY;  Service: Gastroenterology;  Laterality: N/A;  preop: Encounter for screening for malignant neoplasm of colon  post op: normal  PCP: Chas Navarro MD    HYSTERECTOMY      without BSO    KNEE ARTHROPLASTY      LUNG SURGERY Right 12/02/2020    Stent placed in right lung    TOTAL THYROIDECTOMY      WISDOM TOOTH EXTRACTION         Family History  Family History   Problem Relation Age of Onset    Prostate cancer Father 72    Arthritis Mother     Arthritis Brother     Colon cancer Paternal Uncle 50    Thyroid cancer Other     No Known Problems Sister     No Known Problems Daughter     No Known Problems Son     No Known Problems Maternal Grandmother     No Known Problems Paternal Grandmother     No Known Problems Maternal Aunt     No Known Problems Paternal Aunt     Breast cancer Neg Hx     Ovarian cancer Neg Hx     Uterine cancer Neg Hx     Melanoma Neg Hx     BRCA 1/2 Neg Hx     Endometrial cancer Neg Hx        The following portions of the patient's history were reviewed and updated as appropriate: allergies, current medications, past family history, past medical  history, past social history, past surgical history, and problem list.    Review of Systems   Constitutional:  Negative for activity change, appetite change, chills, diaphoresis, fatigue, fever, unexpected weight gain and unexpected weight loss.   HENT:  Negative for congestion, dental problem, drooling, ear discharge, ear pain, facial swelling, hearing loss, mouth sores, nosebleeds, postnasal drip, rhinorrhea, sinus pressure, sneezing, sore throat, swollen glands, tinnitus, trouble swallowing and voice change.    Eyes:  Negative for blurred vision, double vision, photophobia, pain, discharge, redness, itching and visual disturbance.   Respiratory:  Negative for apnea, cough, choking, chest tightness, shortness of breath, wheezing and stridor.    Cardiovascular:  Negative for chest pain, palpitations and leg swelling.   Gastrointestinal:  Negative for abdominal distention, abdominal pain, anal bleeding, anorexia, blood in stool, constipation, diarrhea, nausea, rectal pain, vomiting, GERD and indigestion.   Endocrine: Negative for cold intolerance, heat intolerance, polydipsia, polyphagia and polyuria.   Genitourinary:  Negative for amenorrhea, breast discharge, breast lump, breast pain, decreased libido, decreased urine volume, difficulty urinating, dyspareunia, dysuria, flank pain, frequency, genital sores, hematuria, menstrual problem, pelvic pain, pelvic pressure, urgency, urinary incontinence, vaginal bleeding, vaginal discharge and vaginal pain.   Musculoskeletal:  Negative for arthralgias, back pain, gait problem, joint pain, joint swelling, myalgias, neck pain, neck stiffness and bursitis.   Skin:  Negative for color change, dry skin and rash.   Allergic/Immunologic: Negative for environmental allergies, food allergies and immunocompromised state.   Neurological:  Negative for dizziness, tremors, seizures, syncope, facial asymmetry, speech difficulty, weakness, light-headedness, numbness, headache, memory  problem and confusion.   Hematological:  Negative for adenopathy. Does not bruise/bleed easily.   Psychiatric/Behavioral:  Negative for agitation, behavioral problems, decreased concentration, dysphoric mood, hallucinations, self-injury, sleep disturbance, suicidal ideas, negative for hyperactivity, depressed mood and stress. The patient is not nervous/anxious.        Objective   Physical Exam  Vitals and nursing note reviewed. Exam conducted with a chaperone present.   Constitutional:       General: She is not in acute distress.     Appearance: She is well-developed. She is not diaphoretic.   HENT:      Head: Normocephalic.      Right Ear: External ear normal.      Left Ear: External ear normal.      Nose: Nose normal.   Eyes:      General: No scleral icterus.        Right eye: No discharge.         Left eye: No discharge.      Conjunctiva/sclera: Conjunctivae normal.      Pupils: Pupils are equal, round, and reactive to light.   Neck:      Thyroid: No thyromegaly.      Vascular: No carotid bruit.      Trachea: No tracheal deviation.   Cardiovascular:      Rate and Rhythm: Normal rate and regular rhythm.      Heart sounds: Normal heart sounds. No murmur heard.  Pulmonary:      Effort: Pulmonary effort is normal. No respiratory distress.      Breath sounds: Normal breath sounds. No wheezing.   Chest:   Breasts:     Breasts are symmetrical.      Right: Normal. No swelling, bleeding, inverted nipple, mass, nipple discharge, skin change or tenderness.      Left: Normal. No swelling, bleeding, inverted nipple, mass, nipple discharge, skin change or tenderness.   Abdominal:      General: There is no distension.      Palpations: Abdomen is soft. There is no mass.      Tenderness: There is no abdominal tenderness. There is no right CVA tenderness, left CVA tenderness or guarding.      Hernia: No hernia is present. There is no hernia in the left inguinal area or right inguinal area.   Genitourinary:     General: Normal  vulva.      Exam position: Lithotomy position.      Labia:         Right: No rash, tenderness, lesion or injury.         Left: No rash, tenderness, lesion or injury.       Vagina: Normal. No signs of injury and foreign body. No vaginal discharge, erythema, tenderness or bleeding.      Uterus: Absent.       Adnexa: Right adnexa normal and left adnexa normal.        Right: No mass, tenderness or fullness.          Left: No mass, tenderness or fullness.        Rectum: Normal. No mass.      Comments: Cervix and uterus are surgically absent  BSU normal  Urethral meatus  Normal  Perineum  Normal  Musculoskeletal:         General: No tenderness. Normal range of motion.      Cervical back: Normal range of motion and neck supple.   Lymphadenopathy:      Head:      Right side of head: No submental, submandibular, tonsillar, preauricular, posterior auricular or occipital adenopathy.      Left side of head: No submental, submandibular, tonsillar, preauricular, posterior auricular or occipital adenopathy.      Cervical: No cervical adenopathy.      Right cervical: No superficial, deep or posterior cervical adenopathy.     Left cervical: No superficial, deep or posterior cervical adenopathy.      Upper Body:      Right upper body: No supraclavicular, axillary or pectoral adenopathy.      Left upper body: No supraclavicular, axillary or pectoral adenopathy.      Lower Body: No right inguinal adenopathy. No left inguinal adenopathy.   Skin:     General: Skin is warm and dry.      Findings: No bruising, erythema or rash.   Neurological:      Mental Status: She is alert and oriented to person, place, and time.      Coordination: Coordination normal.   Psychiatric:         Mood and Affect: Mood normal.         Behavior: Behavior normal.         Thought Content: Thought content normal.         Judgment: Judgment normal.         PHQ-9 Depression Screening  Little interest or pleasure in doing things? 0-->not at all   Feeling down,  depressed, or hopeless? 0-->not at all   Trouble falling or staying asleep, or sleeping too much?     Feeling tired or having little energy?     Poor appetite or overeating?     Feeling bad about yourself - or that you are a failure or have let yourself or your family down?     Trouble concentrating on things, such as reading the newspaper or watching television?     Moving or speaking so slowly that other people could have noticed? Or the opposite - being so fidgety or restless that you have been moving around a lot more than usual?     Thoughts that you would be better off dead, or of hurting yourself in some way?     PHQ-9 Total Score 0   If you checked off any problems, how difficult have these problems made it for you to do your work, take care of things at home, or get along with other people?          Assessment & Plan   Diagnoses and all orders for this visit:    1. Well woman exam with routine gynecological exam (Primary)  Normal GYN exam. Will have lab work. Encouraged SBE, pt is aware how to do self breast exam and the importance of same. Discussed weight management and importance of maintaining a healthy weight. Discussed Vitamin D intake and the importance of adequate vitamin D for both Bone Health and a healthy immune system.  Discussed Daily exercise and the importance of same, in regards to a healthy heart as well as helping to maintain her weight and improving her mental health.  BMI 24.5.  Colonoscopy is up to date.  Mammogram will be scheduled at [facility].  Pap smear is not done after we discussed ASCCP guidelines.        -     TSH  -     T3, Uptake  -     T4, Free  -     Hemoglobin A1c  -     Comprehensive Metabolic Panel  -     Lipid Panel With LDL / HDL Ratio  -     CBC & Differential  -     Urine Culture - , Urine, Clean Catch  -     UA / M With / Rflx Culture([facility] ONLY) - Urine, Clean Catch  -     Hepatitis C Antibody    2. Reactive depression  Comments:  Pt. is doing well on  Wellbutrin. Rf given.  Orders:  -     buPROPion XL (WELLBUTRIN XL) 300 MG 24 hr tablet; Take 1 tablet by mouth Daily.  Dispense: 90 tablet; Refill: 3    3. Menopausal symptoms  Comments:  Patient is doing well with Estrace 0.5 mg.  Refill is sent.  Orders:  -     estradiol (ESTRACE) 0.5 MG tablet; Take 1 tablet by mouth Daily.  Dispense: 90 tablet; Refill: 3    4. Thyroid cancer  Comments:  She has history of thyroid cancer.  She is followed by Dr. Panda.    5. Fibrosing mediastinitis  Comments:  Has an appointment at [facility] for a repeat stent next friday.    6. Encounter for screening mammogram for breast cancer  Comments:  She will have a mammogram at [facility].  Orders:  -     Mammo screening digital tomosynthesis bilateral w CAD; Future    7. Vitamin D deficiency  Comments:  She will have labs drawn today.  Orders:  -     Vitamin D,25-Hydroxy    8. Vitamin B 12 deficiency  Comments:  Have labs drawn today.  Orders:  -     Vitamin B12    9. Other fatigue  Comments:  Patient complains of fatigue.  She will have labs drawn today.  It has been a while since she had her thyroid checked so we will also do that.         BMI is within normal parameters. No other follow-up for BMI required.      Lynette Gallegos, APRN  7/26/2024

## 2024-07-28 LAB
25(OH)D3+25(OH)D2 SERPL-MCNC: 90.6 NG/ML (ref 30–100)
ALBUMIN SERPL-MCNC: 3.9 G/DL (ref 3.8–4.9)
ALP SERPL-CCNC: 99 IU/L (ref 44–121)
ALT SERPL-CCNC: 33 IU/L (ref 0–32)
APPEARANCE UR: CLEAR
AST SERPL-CCNC: 32 IU/L (ref 0–40)
BACTERIA #/AREA URNS HPF: NORMAL /[HPF]
BACTERIA UR CULT: NO GROWTH
BACTERIA UR CULT: NORMAL
BASOPHILS # BLD AUTO: 0.1 X10E3/UL (ref 0–0.2)
BASOPHILS NFR BLD AUTO: 1 %
BILIRUB SERPL-MCNC: 0.3 MG/DL (ref 0–1.2)
BILIRUB UR QL STRIP: NEGATIVE
BUN SERPL-MCNC: 16 MG/DL (ref 6–24)
BUN/CREAT SERPL: 17 (ref 9–23)
CALCIUM SERPL-MCNC: 8.7 MG/DL (ref 8.7–10.2)
CASTS URNS QL MICRO: NORMAL /LPF
CHLORIDE SERPL-SCNC: 104 MMOL/L (ref 96–106)
CHOLEST SERPL-MCNC: 182 MG/DL (ref 100–199)
CO2 SERPL-SCNC: 26 MMOL/L (ref 20–29)
COLOR UR: YELLOW
CREAT SERPL-MCNC: 0.93 MG/DL (ref 0.57–1)
EGFRCR SERPLBLD CKD-EPI 2021: 74 ML/MIN/1.73
EOSINOPHIL # BLD AUTO: 0.1 X10E3/UL (ref 0–0.4)
EOSINOPHIL NFR BLD AUTO: 2 %
EPI CELLS #/AREA URNS HPF: NORMAL /HPF (ref 0–10)
ERYTHROCYTE [DISTWIDTH] IN BLOOD BY AUTOMATED COUNT: 12 % (ref 11.7–15.4)
GLOBULIN SER CALC-MCNC: 3.1 G/DL (ref 1.5–4.5)
GLUCOSE SERPL-MCNC: 77 MG/DL (ref 70–99)
GLUCOSE UR QL STRIP: NEGATIVE
HBA1C MFR BLD: 5.1 % (ref 4.8–5.6)
HCT VFR BLD AUTO: 38.4 % (ref 34–46.6)
HCV IGG SERPL QL IA: NON REACTIVE
HDLC SERPL-MCNC: 78 MG/DL
HGB BLD-MCNC: 13 G/DL (ref 11.1–15.9)
HGB UR QL STRIP: NEGATIVE
IMM GRANULOCYTES # BLD AUTO: 0 X10E3/UL (ref 0–0.1)
IMM GRANULOCYTES NFR BLD AUTO: 0 %
KETONES UR QL STRIP: NEGATIVE
LDLC SERPL CALC-MCNC: 89 MG/DL (ref 0–99)
LDLC/HDLC SERPL: 1.1 RATIO (ref 0–3.2)
LEUKOCYTE ESTERASE UR QL STRIP: NEGATIVE
LYMPHOCYTES # BLD AUTO: 1.9 X10E3/UL (ref 0.7–3.1)
LYMPHOCYTES NFR BLD AUTO: 30 %
MCH RBC QN AUTO: 31 PG (ref 26.6–33)
MCHC RBC AUTO-ENTMCNC: 33.9 G/DL (ref 31.5–35.7)
MCV RBC AUTO: 91 FL (ref 79–97)
MICRO URNS: NORMAL
MICRO URNS: NORMAL
MONOCYTES # BLD AUTO: 0.8 X10E3/UL (ref 0.1–0.9)
MONOCYTES NFR BLD AUTO: 12 %
NEUTROPHILS # BLD AUTO: 3.5 X10E3/UL (ref 1.4–7)
NEUTROPHILS NFR BLD AUTO: 55 %
NITRITE UR QL STRIP: NEGATIVE
PH UR STRIP: 7.5 [PH] (ref 5–7.5)
PLATELET # BLD AUTO: 290 X10E3/UL (ref 150–450)
POTASSIUM SERPL-SCNC: 3.9 MMOL/L (ref 3.5–5.2)
PROT SERPL-MCNC: 7 G/DL (ref 6–8.5)
PROT UR QL STRIP: NEGATIVE
RBC # BLD AUTO: 4.2 X10E6/UL (ref 3.77–5.28)
RBC #/AREA URNS HPF: NORMAL /HPF (ref 0–2)
SODIUM SERPL-SCNC: 142 MMOL/L (ref 134–144)
SP GR UR STRIP: 1.01 (ref 1–1.03)
T3RU NFR SERPL: 35 % (ref 24–39)
T4 FREE SERPL-MCNC: 1.98 NG/DL (ref 0.82–1.77)
TRIGL SERPL-MCNC: 85 MG/DL (ref 0–149)
TSH SERPL DL<=0.005 MIU/L-ACNC: 0.27 UIU/ML (ref 0.45–4.5)
URINALYSIS REFLEX: NORMAL
UROBILINOGEN UR STRIP-MCNC: 0.2 MG/DL (ref 0.2–1)
VIT B12 SERPL-MCNC: 774 PG/ML (ref 232–1245)
VLDLC SERPL CALC-MCNC: 15 MG/DL (ref 5–40)
WBC # BLD AUTO: 6.4 X10E3/UL (ref 3.4–10.8)
WBC #/AREA URNS HPF: NORMAL /HPF (ref 0–5)

## 2024-08-01 DIAGNOSIS — N95.1 MENOPAUSAL SYMPTOMS: ICD-10-CM

## 2024-08-01 RX ORDER — ESTRADIOL 0.5 MG/1
0.5 TABLET ORAL DAILY
Qty: 30 TABLET | Refills: 1 | OUTPATIENT
Start: 2024-08-01

## 2024-08-16 ENCOUNTER — HOSPITAL ENCOUNTER (OUTPATIENT)
Dept: MAMMOGRAPHY | Facility: HOSPITAL | Age: 54
Discharge: HOME | End: 2024-08-16
Admitting: NURSE PRACTITIONER
Payer: COMMERCIAL

## 2024-08-16 DIAGNOSIS — Z12.31 ENCOUNTER FOR SCREENING MAMMOGRAM FOR BREAST CANCER: ICD-10-CM

## 2024-08-16 PROCEDURE — 77063 BREAST TOMOSYNTHESIS BI: CPT

## 2024-08-16 PROCEDURE — 77067 SCR MAMMO BI INCL CAD: CPT

## 2024-12-23 DIAGNOSIS — J98.51 FIBROSING MEDIASTINITIS: ICD-10-CM

## 2024-12-23 RX ORDER — ALBUTEROL SULFATE 90 UG/1
2 POWDER, METERED RESPIRATORY (INHALATION) EVERY 4 HOURS PRN
Qty: 1 EACH | Refills: 6 | OUTPATIENT
Start: 2024-12-23

## 2025-05-14 DIAGNOSIS — N95.1 MENOPAUSAL SYMPTOMS: ICD-10-CM

## 2025-05-14 RX ORDER — ESTRADIOL 0.5 MG/1
0.5 TABLET ORAL DAILY
Qty: 90 TABLET | Refills: 4 | OUTPATIENT
Start: 2025-05-14

## 2025-08-29 DIAGNOSIS — N95.1 MENOPAUSAL SYMPTOMS: ICD-10-CM

## 2025-08-29 RX ORDER — ESTRADIOL 0.5 MG/1
0.5 TABLET ORAL DAILY
Qty: 90 TABLET | Refills: 3 | Status: SHIPPED | OUTPATIENT
Start: 2025-08-29